# Patient Record
Sex: FEMALE | Race: BLACK OR AFRICAN AMERICAN | Employment: FULL TIME | URBAN - METROPOLITAN AREA
[De-identification: names, ages, dates, MRNs, and addresses within clinical notes are randomized per-mention and may not be internally consistent; named-entity substitution may affect disease eponyms.]

---

## 2017-01-14 DIAGNOSIS — M17.0 PRIMARY OSTEOARTHRITIS OF KNEES, BILATERAL: ICD-10-CM

## 2017-01-14 DIAGNOSIS — M25.551 RIGHT HIP PAIN: ICD-10-CM

## 2017-01-16 RX ORDER — IBUPROFEN 800 MG/1
TABLET ORAL
Qty: 90 TAB | Refills: 0 | Status: SHIPPED | OUTPATIENT
Start: 2017-01-16 | End: 2017-04-01 | Stop reason: SDUPTHER

## 2017-02-14 RX ORDER — LOSARTAN POTASSIUM AND HYDROCHLOROTHIAZIDE 12.5; 5 MG/1; MG/1
TABLET ORAL
Qty: 90 TAB | Refills: 0 | Status: SHIPPED | OUTPATIENT
Start: 2017-02-14 | End: 2017-03-28 | Stop reason: SDUPTHER

## 2017-03-28 ENCOUNTER — OFFICE VISIT (OUTPATIENT)
Dept: FAMILY MEDICINE CLINIC | Age: 52
End: 2017-03-28

## 2017-03-28 VITALS
RESPIRATION RATE: 16 BRPM | SYSTOLIC BLOOD PRESSURE: 144 MMHG | HEART RATE: 78 BPM | OXYGEN SATURATION: 96 % | DIASTOLIC BLOOD PRESSURE: 90 MMHG | HEIGHT: 63 IN | BODY MASS INDEX: 49.61 KG/M2 | WEIGHT: 280 LBS | TEMPERATURE: 98.2 F

## 2017-03-28 DIAGNOSIS — D50.9 IRON DEFICIENCY ANEMIA, UNSPECIFIED IRON DEFICIENCY ANEMIA TYPE: ICD-10-CM

## 2017-03-28 DIAGNOSIS — E78.00 HYPERCHOLESTEREMIA: ICD-10-CM

## 2017-03-28 DIAGNOSIS — R73.03 PREDIABETES: ICD-10-CM

## 2017-03-28 DIAGNOSIS — I10 ESSENTIAL HYPERTENSION: Primary | ICD-10-CM

## 2017-03-28 RX ORDER — SIMVASTATIN 10 MG/1
TABLET, FILM COATED ORAL
Qty: 90 TAB | Refills: 3 | Status: SHIPPED | OUTPATIENT
Start: 2017-03-28 | End: 2018-04-05 | Stop reason: SDUPTHER

## 2017-03-28 RX ORDER — LOSARTAN POTASSIUM AND HYDROCHLOROTHIAZIDE 12.5; 5 MG/1; MG/1
TABLET ORAL
Qty: 90 TAB | Refills: 3 | Status: SHIPPED | OUTPATIENT
Start: 2017-03-28 | End: 2017-07-27 | Stop reason: DRUGHIGH

## 2017-03-28 NOTE — PROGRESS NOTES
1. Have you been to the ER, urgent care clinic since your last visit? Hospitalized since your last visit? No    2. Have you seen or consulted any other health care providers outside of the [address] [place] [address] since your last visit? Include any pap smears or colon screening.  Sandra Vasquez MD - gynecology / cardiovascular  - Dr. Gregory Rothman

## 2017-03-28 NOTE — MR AVS SNAPSHOT
Visit Information Date & Time Provider Department Dept. Phone Encounter #  
 3/28/2017  3:20 PM Liam Whitley, [facility] [phone] Follow-up Instructions Return in about 4 months (around 7/28/2017). Upcoming Health Maintenance Date Due Hepatitis C Screening 1965 DTaP/Tdap/Td series (1 - Tdap) 2/25/1986 BREAST CANCER SCRN MAMMOGRAM 2/25/2015 FOBT Q 1 YEAR AGE 50-75 2/25/2015 INFLUENZA AGE 9 TO ADULT 8/1/2016 PAP AKA CERVICAL CYTOLOGY 2/3/2017 Allergies as of 3/28/2017  Review Complete On: 3/28/2017 By: Carla Bonner LPN No Known Allergies Current Immunizations  Reviewed on 11/29/2016 No immunizations on file. Not reviewed this visit You Were Diagnosed With   
  
 Codes Comments Essential hypertension    -  Primary ICD-10-CM: I10 
ICD-9-CM: 401.9 Hypercholesteremia     ICD-10-CM: E78.00 ICD-9-CM: 272.0 Prediabetes     ICD-10-CM: R73.03 
ICD-9-CM: 790.29 Iron deficiency anemia, unspecified iron deficiency anemia type     ICD-10-CM: D50.9 ICD-9-CM: 280.9 Vitals BP Pulse Temp Resp Height(growth percentile) Weight(growth percentile) 144/90 78 98.2 °F (36.8 °C) (Oral) 16 5' 3\" (1.6 m) 280 lb (127 kg) LMP SpO2 BMI OB Status Smoking Status 03/20/2017 96% 49.6 kg/m2 Having regular periods Never Smoker Vitals History BMI and BSA Data Body Mass Index Body Surface Area  
 49.6 kg/m 2 2.38 m 2 [facility]erred [facility]acy [facility] [address] [facility] Your Updated Medication List  
  
   
This list is accurate as of: 3/28/17  4:38 PM.  Always use your most recent med list.  
  
  
  
  
 cholecalciferol (VITAMIN D3) 5,000 unit Tab tablet Commonly known as:  VITAMIN D3 Take 5,000 Units by mouth daily. clobetasol 0.05 % topical gel Commonly known as:  Maricarmen Vang ferrous sulfate 325 mg (65 mg iron) tablet Take 1 Tab by mouth three (3) times daily (with meals). * ibuprofen 800 mg tablet Commonly known as:  MOTRIN Take 800 mg by mouth as needed. Takes  2 at night as needed * ibuprofen 800 mg tablet Commonly known as:  MOTRIN  
TAKE ONE TABLET BY MOUTH THREE TIMES DAILY losartan-hydroCHLOROthiazide 50-12.5 mg per tablet Commonly known as:  HYZAAR  
TAKE ONE TABLET BY MOUTH ONCE DAILY  
  
 omeprazole 40 mg capsule Commonly known as:  PRILOSEC * simvastatin 10 mg tablet Commonly known as:  ZOCOR Take 10 mg by mouth daily. * simvastatin 10 mg tablet Commonly known as:  ZOCOR  
TAKE ONE TABLET BY MOUTH NIGHTLY * Notice: This list has 4 medication(s) that are the same as other medications prescribed for you. Read the directions carefully, and ask your doctor or other care provider to review them with you. Prescriptions Sent to Pharmacy Refills  
 simvastatin (ZOCOR) 10 mg tablet 3 Sig: TAKE ONE TABLET BY MOUTH NIGHTLY Class: Normal  
 Pharmacy: [facility] [address] [place] [address], [address] Ph #: [phone]  
 losartan-hydroCHLOROthiazide (HYZAAR) 50-12.5 mg per tablet 3 Sig: TAKE ONE TABLET BY MOUTH ONCE DAILY Class: Normal  
 Pharmacy: [address] [place] [address], [address] [place] [address] Ph #: [phone] Follow-up Instructions Return in about 4 months (around 7/28/2017). To-Do List   
 03/28/2017 Lab:  CBC WITH AUTOMATED DIFF   
  
 03/28/2017 Lab:  HEMOGLOBIN A1C WITH EAG   
  
 04/04/2017 Lab:  ALT   
  
 04/04/2017 Lab:  AST   
  
 04/04/2017 Lab:  LIPID PANEL   
  
 04/04/2017 Lab:  METABOLIC PANEL, BASIC Introducing [facility]! Dear Janell Thomas: 
Thank you for requesting a [facility] account. Our records indicate that you already have an active [facility] account.   You can access your account anytime at [email or web address] Did you know that you can access your hospital and ER discharge instructions at any time in [facility]? You can also review all of your test results from your hospital stay or ER visit. Additional Information If you have questions, please visit the Frequently Asked Questions section of the [facility] website at [email or web address] Remember, [facility] is NOT to be used for urgent needs. For medical emergencies, dial 911. Now available from your iPhone and Android! Please provide this summary of care documentation to your next provider. Your primary care clinician is listed as [address]. If you have any questions after today's visit, please call [phone].

## 2017-03-28 NOTE — PROGRESS NOTES
HISTORY OF PRESENT ILLNESS  Isidro Mora is a 46 y.o. female. HPI  Patient is here today for evaluation and treatment of: Hypertension/Cholesterol/Anemia    Hypertension: Pt is on hyzaar ; she tolerates med well; Cholesterol: continues on zocor;  Plans to begin a weight loss regimen to control her cholesterol as well as her weight and BP  Pt has also had lab evidence of  Prediabetes. Anemia: she has had a D and C;  She has not yet had a repeat CBC; she is no longer having heavy periods    PMH,  Meds, Allergies, Family History, Social history reviewed        Current Outpatient Prescriptions:     losartan-hydroCHLOROthiazide (HYZAAR) 50-12.5 mg per tablet, TAKE ONE TABLET BY MOUTH ONCE DAILY, Disp: 90 Tab, Rfl: 0    ibuprofen (MOTRIN) 800 mg tablet, TAKE ONE TABLET BY MOUTH THREE TIMES DAILY, Disp: 90 Tab, Rfl: 0    simvastatin (ZOCOR) 10 mg tablet, Take 10 mg by mouth daily. , Disp: , Rfl:     polyethylene glycol (MIRALAX) 17 gram packet, Take 17 g by mouth once., Disp: , Rfl:     cholecalciferol, VITAMIN D3, (VITAMIN D3) 5,000 unit tab tablet, Take 5,000 Units by mouth daily. , Disp: , Rfl:     ferrous sulfate 325 mg (65 mg iron) tablet, Take 1 Tab by mouth three (3) times daily (with meals). , Disp: 100 Tab, Rfl: 6    clobetasol (TEMOVATE) 0.05 % topical gel, , Disp: , Rfl:     omeprazole (PRILOSEC) 40 mg capsule, , Disp: , Rfl:     ibuprofen (MOTRIN) 800 mg tablet, Take 800 mg by mouth as needed. Takes  2 at night as needed , Disp: , Rfl:     miSOPROStol (CYTOTEC) 200 mcg tablet, Take 200 mcg by mouth two (2) times a day. Indications: instructed to take sunday 2 times a day, and monday 2 times a  day, Disp: , Rfl:       Review of Systems   Constitutional: Negative for malaise/fatigue and weight loss. Cardiovascular: Negative for chest pain, palpitations and leg swelling. Physical Exam   Constitutional: She appears well-developed and well-nourished. No distress.    Cardiovascular: Normal rate and regular rhythm. Exam reveals no gallop and no friction rub. No murmur heard. Pulmonary/Chest: Breath sounds normal. No respiratory distress. She has no wheezes. Musculoskeletal: She exhibits no edema. Nursing note and vitals reviewed. Visit Vitals    BP (!) 144/94 (BP 1 Location: Left arm, BP Patient Position: Sitting)    Pulse 78    Temp 98.2 °F (36.8 °C) (Oral)    Resp 16    Ht 5' 3\" (1.6 m)    Wt 280 lb (127 kg)    LMP 03/20/2017    SpO2 96%    BMI 49.6 kg/m2       ASSESSMENT and PLAN    ICD-10-CM ICD-9-CM    1. Essential hypertension- slightly elevated;  M73 664.7 METABOLIC PANEL, BASIC   2. Hypercholesteremia E78.00 272.0 LIPID PANEL      AST      ALT   3. Prediabetes R73.03 790.29 HEMOGLOBIN A1C WITH EAG   4. Iron deficiency anemia, unspecified iron deficiency anemia type D50.9 280.9 CBC WITH AUTOMATED DIFF       As above,   above all stable unless otherwise noted  Labs as ordered  Continue current meds as ordered  Follow-up Disposition:  Return in about 4 months (around 7/28/2017). An After Visit Summary was printed and given to the patient. This has been fully explained to the patient, who indicates understanding.

## 2017-03-30 ENCOUNTER — HOSPITAL ENCOUNTER (OUTPATIENT)
Dept: LAB | Age: 52
Discharge: HOME | End: 2017-03-30
Payer: COMMERCIAL

## 2017-03-30 DIAGNOSIS — E78.00 HYPERCHOLESTEREMIA: ICD-10-CM

## 2017-03-30 DIAGNOSIS — I10 ESSENTIAL HYPERTENSION: ICD-10-CM

## 2017-03-30 DIAGNOSIS — D50.9 IRON DEFICIENCY ANEMIA, UNSPECIFIED IRON DEFICIENCY ANEMIA TYPE: ICD-10-CM

## 2017-03-30 DIAGNOSIS — R73.03 PREDIABETES: ICD-10-CM

## 2017-03-30 LAB
ALT SERPL-CCNC: 18 U/L (ref 13–56)
ANION GAP BLD CALC-SCNC: 8 MMOL/L (ref 3–18)
AST SERPL W P-5'-P-CCNC: 8 U/L (ref 15–37)
BASOPHILS # BLD AUTO: 0 K/UL (ref 0–0.06)
BASOPHILS # BLD: 1 % (ref 0–2)
BUN SERPL-MCNC: 16 MG/DL (ref 7–18)
BUN/CREAT SERPL: 22 (ref 12–20)
CALCIUM SERPL-MCNC: 9.2 MG/DL (ref 8.5–10.1)
CHLORIDE SERPL-SCNC: 103 MMOL/L (ref 100–108)
CHOLEST SERPL-MCNC: 161 MG/DL
CO2 SERPL-SCNC: 29 MMOL/L (ref 21–32)
CREAT SERPL-MCNC: 0.74 MG/DL (ref 0.6–1.3)
DIFFERENTIAL METHOD BLD: ABNORMAL
EOSINOPHIL # BLD: 0.2 K/UL (ref 0–0.4)
EOSINOPHIL NFR BLD: 3 % (ref 0–5)
ERYTHROCYTE [DISTWIDTH] IN BLOOD BY AUTOMATED COUNT: 19.8 % (ref 11.6–14.5)
EST. AVERAGE GLUCOSE BLD GHB EST-MCNC: 143 MG/DL
GLUCOSE SERPL-MCNC: 122 MG/DL (ref 74–99)
HBA1C MFR BLD: 6.6 % (ref 4.2–5.6)
HCT VFR BLD AUTO: 33.5 % (ref 35–45)
HDLC SERPL-MCNC: 49 MG/DL (ref 40–60)
HDLC SERPL: 3.3 {RATIO} (ref 0–5)
HGB BLD-MCNC: 10.3 G/DL (ref 12–16)
LDLC SERPL CALC-MCNC: 88.6 MG/DL (ref 0–100)
LIPID PROFILE,FLP: NORMAL
LYMPHOCYTES # BLD AUTO: 33 % (ref 21–52)
LYMPHOCYTES # BLD: 2.2 K/UL (ref 0.9–3.6)
MCH RBC QN AUTO: 22.8 PG (ref 24–34)
MCHC RBC AUTO-ENTMCNC: 30.7 G/DL (ref 31–37)
MCV RBC AUTO: 74.3 FL (ref 74–97)
MONOCYTES # BLD: 0.3 K/UL (ref 0.05–1.2)
MONOCYTES NFR BLD AUTO: 5 % (ref 3–10)
NEUTS SEG # BLD: 3.8 K/UL (ref 1.8–8)
NEUTS SEG NFR BLD AUTO: 58 % (ref 40–73)
PLATELET # BLD AUTO: 348 K/UL (ref 135–420)
PMV BLD AUTO: 10.5 FL (ref 9.2–11.8)
POTASSIUM SERPL-SCNC: 3.8 MMOL/L (ref 3.5–5.5)
RBC # BLD AUTO: 4.51 M/UL (ref 4.2–5.3)
SODIUM SERPL-SCNC: 140 MMOL/L (ref 136–145)
TRIGL SERPL-MCNC: 117 MG/DL (ref ?–150)
VLDLC SERPL CALC-MCNC: 23.4 MG/DL
WBC # BLD AUTO: 6.6 K/UL (ref 4.6–13.2)

## 2017-03-30 PROCEDURE — 80048 BASIC METABOLIC PNL TOTAL CA: CPT | Performed by: FAMILY MEDICINE

## 2017-03-30 PROCEDURE — 83036 HEMOGLOBIN GLYCOSYLATED A1C: CPT | Performed by: FAMILY MEDICINE

## 2017-03-30 PROCEDURE — 36415 COLL VENOUS BLD VENIPUNCTURE: CPT | Performed by: FAMILY MEDICINE

## 2017-03-30 PROCEDURE — 84460 ALANINE AMINO (ALT) (SGPT): CPT | Performed by: FAMILY MEDICINE

## 2017-03-30 PROCEDURE — 80061 LIPID PANEL: CPT | Performed by: FAMILY MEDICINE

## 2017-03-30 PROCEDURE — 85025 COMPLETE CBC W/AUTO DIFF WBC: CPT | Performed by: FAMILY MEDICINE

## 2017-03-30 PROCEDURE — 84450 TRANSFERASE (AST) (SGOT): CPT | Performed by: FAMILY MEDICINE

## 2017-04-01 DIAGNOSIS — M25.551 RIGHT HIP PAIN: ICD-10-CM

## 2017-04-01 DIAGNOSIS — M17.0 PRIMARY OSTEOARTHRITIS OF KNEES, BILATERAL: ICD-10-CM

## 2017-04-03 RX ORDER — IBUPROFEN 800 MG/1
TABLET ORAL
Qty: 90 TAB | Refills: 0 | Status: SHIPPED | OUTPATIENT
Start: 2017-04-03 | End: 2017-06-25 | Stop reason: SDUPTHER

## 2017-06-24 DIAGNOSIS — M17.0 PRIMARY OSTEOARTHRITIS OF KNEES, BILATERAL: ICD-10-CM

## 2017-06-24 DIAGNOSIS — M25.551 RIGHT HIP PAIN: ICD-10-CM

## 2017-06-25 DIAGNOSIS — M17.0 PRIMARY OSTEOARTHRITIS OF KNEES, BILATERAL: ICD-10-CM

## 2017-06-25 DIAGNOSIS — M25.551 RIGHT HIP PAIN: ICD-10-CM

## 2017-06-26 RX ORDER — IBUPROFEN 800 MG/1
TABLET ORAL
Qty: 90 TAB | Refills: 0 | Status: SHIPPED | OUTPATIENT
Start: 2017-06-26 | End: 2017-10-19 | Stop reason: SDUPTHER

## 2017-06-26 RX ORDER — IBUPROFEN 800 MG/1
TABLET ORAL
Qty: 90 TAB | Refills: 0 | Status: SHIPPED | OUTPATIENT
Start: 2017-06-26 | End: 2017-07-27 | Stop reason: SDUPTHER

## 2017-07-27 ENCOUNTER — OFFICE VISIT (OUTPATIENT)
Dept: FAMILY MEDICINE CLINIC | Age: 52
End: 2017-07-27

## 2017-07-27 VITALS
BODY MASS INDEX: 51.56 KG/M2 | OXYGEN SATURATION: 99 % | HEART RATE: 82 BPM | RESPIRATION RATE: 16 BRPM | HEIGHT: 63 IN | WEIGHT: 291 LBS | SYSTOLIC BLOOD PRESSURE: 124 MMHG | DIASTOLIC BLOOD PRESSURE: 94 MMHG | TEMPERATURE: 98.2 F

## 2017-07-27 DIAGNOSIS — E78.00 HYPERCHOLESTEROLEMIA: ICD-10-CM

## 2017-07-27 DIAGNOSIS — D50.8 IRON DEFICIENCY ANEMIA SECONDARY TO INADEQUATE DIETARY IRON INTAKE: ICD-10-CM

## 2017-07-27 DIAGNOSIS — E55.9 VITAMIN D DEFICIENCY: ICD-10-CM

## 2017-07-27 DIAGNOSIS — R73.03 PREDIABETES: ICD-10-CM

## 2017-07-27 DIAGNOSIS — I10 ESSENTIAL HYPERTENSION: Primary | ICD-10-CM

## 2017-07-27 RX ORDER — GLUCOSAMINE SULFATE 1500 MG
POWDER IN PACKET (EA) ORAL DAILY
COMMUNITY
End: 2018-04-11 | Stop reason: ALTCHOICE

## 2017-07-27 RX ORDER — LOSARTAN POTASSIUM AND HYDROCHLOROTHIAZIDE 12.5; 1 MG/1; MG/1
1 TABLET ORAL DAILY
Qty: 30 TAB | Refills: 6 | Status: SHIPPED | OUTPATIENT
Start: 2017-07-27 | End: 2018-03-08 | Stop reason: SDUPTHER

## 2017-07-27 NOTE — PATIENT INSTRUCTIONS

## 2017-07-27 NOTE — MR AVS SNAPSHOT
Visit Information Date & Time Provider Department Dept. Phone Encounter #  
 7/27/2017  3:40 PM Roverto Magdaleno, [address] [id number] Follow-up Instructions Return in about 4 months (around 11/27/2017) for BP. Upcoming Health Maintenance Date Due Hepatitis C Screening 1965 DTaP/Tdap/Td series (1 - Tdap) 2/25/1986 BREAST CANCER SCRN MAMMOGRAM 2/25/2015 FOBT Q 1 YEAR AGE 50-75 2/25/2015 PAP AKA CERVICAL CYTOLOGY 2/3/2017 INFLUENZA AGE 9 TO ADULT 8/1/2017 Allergies as of 7/27/2017  Review Complete On: 7/27/2017 By: Monique Arriaga LPN No Known Allergies Current Immunizations  Reviewed on 11/29/2016 No immunizations on file. Not reviewed this visit You Were Diagnosed With   
  
 Codes Comments Vitamin D deficiency    -  Primary ICD-10-CM: E55.9 ICD-9-CM: 268.9 Essential hypertension     ICD-10-CM: I10 
ICD-9-CM: 401.9 Hypercholesterolemia     ICD-10-CM: E78.00 ICD-9-CM: 272.0 Prediabetes     ICD-10-CM: R73.03 
ICD-9-CM: 790.29 Iron deficiency anemia secondary to inadequate dietary iron intake     ICD-10-CM: D50.8 ICD-9-CM: 280.1 Vitals BP Pulse Temp Resp Height(growth percentile) Weight(growth percentile) (!) 124/94 82 98.2 °F (36.8 °C) (Oral) 16 5' 3\" (1.6 m) 291 lb (132 kg) LMP SpO2 BMI OB Status Smoking Status 05/02/2017 99% 51.55 kg/m2 Unknown Never Smoker Vitals History BMI and BSA Data Body Mass Index Body Surface Area 51.55 kg/m 2 2.42 m 2 Preferred Pharmacy Pharm[facility] Name Phone [facility] [address] [facility] Your Updated Medication List  
  
   
This list is accurate as of: 7/27/17  4:37 PM.  Always use your most recent med list.  
  
  
  
  
 clobetasol 0.05 % topical gel Commonly known as:  TEMOVATE  
  
 ferrous sulfate 325 mg (65 mg iron) tablet Take 1 Tab by mouth three (3) times daily (with meals). ibuprofen 800 mg tablet Commonly known as:  MOTRIN  
TAKE ONE TABLET BY MOUTH THREE TIMES DAILY losartan-hydroCHLOROthiazide 100-12.5 mg per tablet Commonly known as:  HYZAAR Take 1 Tab by mouth daily. omeprazole 40 mg capsule Commonly known as:  PRILOSEC  
  
 simvastatin 10 mg tablet Commonly known as:  ZOCOR  
TAKE ONE TABLET BY MOUTH NIGHTLY  
  
 VITAMIN D3 1,000 unit Cap Generic drug:  cholecalciferol Take  by mouth daily. Prescriptions Sent to Pharmacy Refills  
 losartan-hydroCHLOROthiazide (HYZAAR) 100-12.5 mg per tablet 6 Sig: Take 1 Tab by mouth daily. Class: Normal  
 Pharmacy: [address] [place] [address] #: [phone] Route: Oral  
  
Follow-up Instructions Return in about 4 months (around 11/27/2017) for BP. To-Do List   
 07/31/2017 Lab:  ALT   
  
 07/31/2017 Lab:  AST   
  
 07/31/2017 Lab:  CBC WITH AUTOMATED DIFF   
  
 07/31/2017 Lab:  HEMOGLOBIN A1C WITH EAG   
  
 07/31/2017 Lab:  LIPID PANEL   
  
 07/31/2017 Lab:  METABOLIC PANEL, BASIC Patient Instructions DASH Diet: Care Instructions Your Care Instructions The DASH diet is an eating plan that can help lower your blood pressure. DASH stands for Dietary Approaches to Stop Hypertension. Hypertension is high blood pressure. The DASH diet focuses on eating foods that are high in calcium, potassium, and magnesium. These nutrients can lower blood pressure. The foods that are highest in these nutrients are fruits, vegetables, low-fat dairy products, nuts, seeds, and legumes. But taking calcium, potassium, and magnesium supplements instead of eating foods that are high in those nutrients does not have the same effect. The DASH diet also includes whole grains, fish, and poultry. The DASH diet is one of several lifestyle changes your doctor may recommend to lower your high blood pressure. Your doctor may also want you to decrease the amount of sodium in your diet. Lowering sodium while following the DASH diet can lower blood pressure even further than just the DASH diet alone. Follow-up care is a key part of your treatment and safety. Be sure to make and go to all appointments, and call your doctor if you are having problems. It's also a good idea to know your test results and keep a list of the medicines you take. How can you care for yourself at home? Following the DASH diet · Eat 4 to 5 servings of fruit each day. A serving is 1 medium-sized piece of fruit, ½ cup chopped or canned fruit, 1/4 cup dried fruit, or 4 ounces (½ cup) of fruit juice. Choose fruit more often than fruit juice. · Eat 4 to 5 servings of vegetables each day. A serving is 1 cup of lettuce or raw leafy vegetables, ½ cup of chopped or cooked vegetables, or 4 ounces (½ cup) of vegetable juice. Choose vegetables more often than vegetable juice. · Get 2 to 3 servings of low-fat and fat-free dairy each day. A serving is 8 ounces of milk, 1 cup of yogurt, or 1 ½ ounces of cheese. · Eat 6 to 8 servings of grains each day. A serving is 1 slice of bread, 1 ounce of dry cereal, or ½ cup of cooked rice, pasta, or cooked cereal. Try to choose whole-grain products as much as possible. · Limit lean meat, poultry, and fish to 2 servings each day. A serving is 3 ounces, about the size of a deck of cards. · Eat 4 to 5 servings of nuts, seeds, and legumes (cooked dried beans, lentils, and split peas) each week. A serving is 1/3 cup of nuts, 2 tablespoons of seeds, or ½ cup of cooked beans or peas. · Limit fats and oils to 2 to 3 servings each day. A serving is 1 teaspoon of vegetable oil or 2 tablespoons of salad dressing. · Limit sweets and added sugars to 5 servings or less a week.  A serving is 1 tablespoon jelly or jam, ½ cup sorbet, or 1 cup of lemonade. · Eat less than 2,300 milligrams (mg) of sodium a day. If you limit your sodium to 1,500 mg a day, you can lower your blood pressure even more. Tips for success · Start small. Do not try to make dramatic changes to your diet all at once. You might feel that you are missing out on your favorite foods and then be more likely to not follow the plan. Make small changes, and stick with them. Once those changes become habit, add a few more changes. · Try some of the following: ¨ Make it a goal to eat a fruit or vegetable at every meal and at snacks. This will make it easy to get the recommended amount of fruits and vegetables each day. ¨ Try yogurt topped with fruit and nuts for a snack or healthy dessert. ¨ Add lettuce, tomato, cucumber, and onion to sandwiches. ¨ Combine a ready-made pizza crust with low-fat mozzarella cheese and lots of vegetable toppings. Try using tomatoes, squash, spinach, broccoli, carrots, cauliflower, and onions. ¨ Have a variety of cut-up vegetables with a low-fat dip as an appetizer instead of chips and dip. ¨ Sprinkle sunflower seeds or chopped almonds over salads. Or try adding chopped walnuts or almonds to cooked vegetables. ¨ Try some vegetarian meals using beans and peas. Add garbanzo or kidney beans to salads. Make burritos and tacos with mashed raygoza beans or black beans. Where can you learn more? Go to http://marcos-stacy.info/. Enter Z374 in the search box to learn more about \"DASH Diet: Care Instructions. \" Current as of: April 3, 2017 Content Version: 11.3 © 6205-8388 [employer]. Care instructions adapted under license by [employer] (which disclaims liability or warranty for this information).  If you have questions about a medical condition or this instruction, always ask your healthcare professional. [employer] disclaims any warranty or liability for your use of this information. Introducing [facility]! Dear Nato Montes: 
Thank you for requesting a [employer] account. Our records indicate that you already have an active [employer] account. You can access your account anytime at [email or web address] Did you know that you can access your hospital and ER discharge instructions at any time in [employer]? You can also review all of your test results from your hospital stay or ER visit. Additional Information If you have questions, please visit the Frequently Asked Questions section of the [employer] website at [email or web address] Remember, [employer] is NOT to be used for urgent needs. For medical emergencies, dial 911. Now available from your iPhone and Android! Please provide this summary of care documentation to your next provider. Your primary care clinician is listed as [address]. If you have any questions after today's visit, please call [phone].

## 2017-07-27 NOTE — PROGRESS NOTES
HISTORY OF PRESENT ILLNESS  Estee Ribera is a 46 y.o. female. HPI  Patient is here today for evaluation and treatment of: Prediabetes/Hypertension/Cholesterol/Anemia    Prediabetes:   She attempts a lower sugar, lower carb diet; She does not exercise much. Does report some weight gain. Hypertension:  BP initially elevated;  Better at next check. Wt Readings from Last 3 Encounters:   07/27/17 291 lb (132 kg)   03/28/17 280 lb (127 kg)   12/06/16 276 lb 7.3 oz (125.4 kg)         Cholesterol: On zocor; attempts a lower cholesterol diet. Wants to lose weight. Anemia: pt has had chronic anemia; she has had a hysterectomy for fibroids; she needs a recheck on her CBC. Pt also has a h/o vitamin D deficiency and needs a recheck on lab      Current Outpatient Prescriptions:     cholecalciferol (VITAMIN D3) 1,000 unit cap, Take  by mouth daily. , Disp: , Rfl:     losartan-hydroCHLOROthiazide (HYZAAR) 100-12.5 mg per tablet, Take 1 Tab by mouth daily. , Disp: 30 Tab, Rfl: 6    ibuprofen (MOTRIN) 800 mg tablet, TAKE ONE TABLET BY MOUTH THREE TIMES DAILY, Disp: 90 Tab, Rfl: 0    simvastatin (ZOCOR) 10 mg tablet, TAKE ONE TABLET BY MOUTH NIGHTLY, Disp: 90 Tab, Rfl: 3    ferrous sulfate 325 mg (65 mg iron) tablet, Take 1 Tab by mouth three (3) times daily (with meals). , Disp: 100 Tab, Rfl: 6    clobetasol (TEMOVATE) 0.05 % topical gel, , Disp: , Rfl:     omeprazole (PRILOSEC) 40 mg capsule, , Disp: , Rfl:       PMH,  Meds, Allergies, Family History, Social history reviewed    Review of Systems   Constitutional: Negative for chills and fever. Cardiovascular: Negative for chest pain, palpitations and leg swelling. Physical Exam   Constitutional: She appears well-developed and well-nourished. No distress. Cardiovascular: Normal rate and regular rhythm. Exam reveals no gallop and no friction rub. No murmur heard. Pulmonary/Chest: Breath sounds normal. No respiratory distress.  She has no wheezes. She has no rales. Musculoskeletal: She exhibits no edema. Nursing note and vitals reviewed. Visit Vitals    BP (!) 124/94    Pulse 82    Temp 98.2 °F (36.8 °C) (Oral)    Resp 16    Ht 5' 3\" (1.6 m)    Wt 291 lb (132 kg)    LMP 05/02/2017    SpO2 99%    BMI 51.55 kg/m2     General appearance: alert, cooperative, no distress, appears stated age  Neck: supple, symmetrical, trachea midline, no adenopathy, thyroid: not enlarged, symmetric, no tenderness/mass/nodules, no carotid bruit and no JVD  Lungs: clear to auscultation bilaterally  Heart: regular rate and rhythm, S1, S2 normal, no murmur, click, rub or gallop  Extremities: extremities normal, atraumatic, no cyanosis or edema        Lab Results   Component Value Date/Time    WBC 6.6 03/30/2017 08:32 AM    Hemoglobin, POC 6.8 11/23/2016 09:09 AM    HGB 10.3 03/30/2017 08:32 AM    Hematocrit, POC 20 11/23/2016 09:09 AM    HCT 33.5 03/30/2017 08:32 AM    PLATELET 041 30/76/2361 08:32 AM    MCV 74.3 03/30/2017 08:32 AM         ASSESSMENT and PLAN    ICD-10-CM ICD-9-CM    1. Essential hypertension H80 779.2 METABOLIC PANEL, BASIC      losartan-hydroCHLOROthiazide (HYZAAR) 100-12.5 mg per tablet   2. Hypercholesterolemia E78.00 272.0 LIPID PANEL      AST      ALT   3. Prediabetes R73.03 790.29 HEMOGLOBIN A1C WITH EAG   4. Vitamin D deficiency E55.9 268.9    5. Iron deficiency anemia secondary to inadequate dietary iron intake D50.8 280.1 CBC WITH AUTOMATED DIFF       As above,   Increase hyzaar 100/12.5  Low sodium diet and exercise   treatment plan as listed below  Orders Placed This Encounter    METABOLIC PANEL, BASIC    LIPID PANEL    AST    ALT    HEMOGLOBIN A1C WITH EAG    CBC WITH AUTOMATED DIFF    cholecalciferol (VITAMIN D3) 1,000 unit cap    losartan-hydroCHLOROthiazide (HYZAAR) 100-12.5 mg per tablet     Follow-up Disposition:  Return in about 4 months (around 11/27/2017) for BP.   An After Visit Summary was printed and given to the patient. This has been fully explained to the patient, who indicates understanding.

## 2017-07-27 NOTE — PROGRESS NOTES
1. Have you been to the ER, urgent care clinic since your last visit? Hospitalized since your last visit? No    2. Have you seen or consulted any other health care providers outside of the [address] [place] [address] since your last visit? Include any pap smears or colon screening.  Yes  - Juan Landa MD - gynecology

## 2017-07-31 RX ORDER — OMEPRAZOLE 40 MG/1
40 CAPSULE, DELAYED RELEASE ORAL DAILY
Qty: 30 CAP | Refills: 3 | Status: SHIPPED | OUTPATIENT
Start: 2017-07-31 | End: 2018-04-05 | Stop reason: SDUPTHER

## 2017-10-19 DIAGNOSIS — M25.551 RIGHT HIP PAIN: ICD-10-CM

## 2017-10-19 DIAGNOSIS — M17.0 PRIMARY OSTEOARTHRITIS OF KNEES, BILATERAL: ICD-10-CM

## 2017-10-20 RX ORDER — IBUPROFEN 800 MG/1
TABLET ORAL
Qty: 90 TAB | Refills: 0 | Status: SHIPPED | OUTPATIENT
Start: 2017-10-20 | End: 2017-12-02 | Stop reason: SDUPTHER

## 2017-11-02 RX ORDER — LANOLIN ALCOHOL/MO/W.PET/CERES
CREAM (GRAM) TOPICAL
Qty: 100 TAB | Refills: 6 | Status: SHIPPED | OUTPATIENT
Start: 2017-11-02 | End: 2018-04-11 | Stop reason: ALTCHOICE

## 2017-12-02 DIAGNOSIS — M17.0 PRIMARY OSTEOARTHRITIS OF KNEES, BILATERAL: ICD-10-CM

## 2017-12-02 DIAGNOSIS — M25.551 RIGHT HIP PAIN: ICD-10-CM

## 2017-12-04 RX ORDER — IBUPROFEN 800 MG/1
TABLET ORAL
Qty: 90 TAB | Refills: 0 | Status: SHIPPED | OUTPATIENT
Start: 2017-12-04 | End: 2018-03-20 | Stop reason: SDUPTHER

## 2018-02-19 DIAGNOSIS — M17.0 PRIMARY OSTEOARTHRITIS OF KNEES, BILATERAL: ICD-10-CM

## 2018-02-19 DIAGNOSIS — M25.551 RIGHT HIP PAIN: ICD-10-CM

## 2018-02-19 RX ORDER — IBUPROFEN 800 MG/1
800 TABLET ORAL 3 TIMES DAILY
Qty: 270 TAB | Refills: 0 | OUTPATIENT
Start: 2018-02-19

## 2018-02-19 NOTE — TELEPHONE ENCOUNTER
Last Visit: 04/13/2016 with MD Jelena Juarez    Next Appointment: noted to f/u prn since completing Euflexxa injection series   Previous Refill Encounters: 12/04/2017 per MD Watson #[id number]     Requested Prescriptions     Pending Prescriptions Disp Refills    ibuprofen (MOTRIN) 800 mg tablet 270 Tab 0     Sig: Take 1 Tab by mouth three (3) times daily.

## 2018-03-08 DIAGNOSIS — I10 ESSENTIAL HYPERTENSION: ICD-10-CM

## 2018-03-08 RX ORDER — LOSARTAN POTASSIUM AND HYDROCHLOROTHIAZIDE 12.5; 1 MG/1; MG/1
TABLET ORAL
Qty: 30 TAB | Refills: 6 | Status: SHIPPED | OUTPATIENT
Start: 2018-03-08 | End: 2018-04-05 | Stop reason: SDUPTHER

## 2018-03-20 ENCOUNTER — OFFICE VISIT (OUTPATIENT)
Dept: ORTHOPEDIC SURGERY | Facility: CLINIC | Age: 53
End: 2018-03-20

## 2018-03-20 VITALS
WEIGHT: 293 LBS | HEART RATE: 80 BPM | TEMPERATURE: 98.1 F | BODY MASS INDEX: 51.91 KG/M2 | RESPIRATION RATE: 16 BRPM | HEIGHT: 63 IN | SYSTOLIC BLOOD PRESSURE: 162 MMHG | OXYGEN SATURATION: 98 % | DIASTOLIC BLOOD PRESSURE: 92 MMHG

## 2018-03-20 DIAGNOSIS — M17.0 PRIMARY OSTEOARTHRITIS OF KNEES, BILATERAL: ICD-10-CM

## 2018-03-20 DIAGNOSIS — M75.51 CHRONIC SHOULDER BURSITIS, RIGHT: ICD-10-CM

## 2018-03-20 DIAGNOSIS — M25.551 RIGHT HIP PAIN: ICD-10-CM

## 2018-03-20 DIAGNOSIS — M25.511 CHRONIC RIGHT SHOULDER PAIN: Primary | ICD-10-CM

## 2018-03-20 DIAGNOSIS — M75.52 CHRONIC SHOULDER BURSITIS, LEFT: ICD-10-CM

## 2018-03-20 DIAGNOSIS — G89.29 CHRONIC RIGHT SHOULDER PAIN: Primary | ICD-10-CM

## 2018-03-20 DIAGNOSIS — G89.29 CHRONIC LEFT SHOULDER PAIN: ICD-10-CM

## 2018-03-20 DIAGNOSIS — S93.402A SPRAIN OF LEFT ANKLE, UNSPECIFIED LIGAMENT, INITIAL ENCOUNTER: ICD-10-CM

## 2018-03-20 DIAGNOSIS — M25.512 CHRONIC LEFT SHOULDER PAIN: ICD-10-CM

## 2018-03-20 DIAGNOSIS — M25.572 CHRONIC PAIN OF LEFT ANKLE: ICD-10-CM

## 2018-03-20 DIAGNOSIS — G89.29 CHRONIC PAIN OF LEFT ANKLE: ICD-10-CM

## 2018-03-20 PROBLEM — E66.01 OBESITY, MORBID (HCC): Status: ACTIVE | Noted: 2018-03-20

## 2018-03-20 RX ORDER — BETAMETHASONE SODIUM PHOSPHATE AND BETAMETHASONE ACETATE 3; 3 MG/ML; MG/ML
6 INJECTION, SUSPENSION INTRA-ARTICULAR; INTRALESIONAL; INTRAMUSCULAR; SOFT TISSUE ONCE
Qty: 0.5 ML | Refills: 0
Start: 2018-03-20 | End: 2018-03-20

## 2018-03-20 RX ORDER — BUPIVACAINE HYDROCHLORIDE 2.5 MG/ML
4 INJECTION, SOLUTION EPIDURAL; INFILTRATION; INTRACAUDAL ONCE
Qty: 4 ML | Refills: 0
Start: 2018-03-20 | End: 2018-03-20

## 2018-03-20 RX ORDER — IBUPROFEN 800 MG/1
800 TABLET ORAL
Qty: 90 TAB | Refills: 0 | Status: SHIPPED | OUTPATIENT
Start: 2018-03-20 | End: 2018-06-21 | Stop reason: SDUPTHER

## 2018-03-20 NOTE — PATIENT INSTRUCTIONS
Shoulder Bursitis: Exercises  Your Care Instructions  Here are some examples of typical rehabilitation exercises for your condition. Start each exercise slowly. Ease off the exercise if you start to have pain. Your doctor or physical therapist will tell you when you can start these exercises and which ones will work best for you. How to do the exercises  Posterior stretching exercise    1. Hold the elbow of your injured arm with your other hand. 2. Use your hand to pull your injured arm gently up and across your body. You will feel a gentle stretch across the back of your injured shoulder. 3. Hold for at least 15 to 30 seconds. Then slowly lower your arm. 4. Repeat 2 to 4 times. Up-the-back stretch    Your doctor or physical therapist may want you to wait to do this stretch until you have regained most of your range of motion and strength. You can do this stretch in different ways. Hold any of these stretches for at least 15 to 30 seconds. Repeat them 2 to 4 times. 1. Put your hand in your back pocket. Let it rest there to stretch your shoulder. 2. With your other hand, hold your injured arm (palm outward) behind your back by the wrist. Pull your arm up gently to stretch your shoulder. 3. Next, put a towel over your other shoulder. Put the hand of your injured arm behind your back. Now hold the back end of the towel. With the other hand, hold the front end of the towel in front of your body. Pull gently on the front end of the towel. This will bring your hand farther up your back to stretch your shoulder. Overhead stretch    1. Standing about an arm's length away, grasp onto a solid surface. You could use a countertop, a doorknob, or the back of a sturdy chair. 2. With your knees slightly bent, bend forward with your arms straight. Lower your upper body, and let your shoulders stretch. 3. As your shoulders are able to stretch farther, you may need to take a step or two backward.   4. Hold for at least 15 to 30 seconds. Then stand up and relax. If you had stepped back during your stretch, step forward so you can keep your hands on the solid surface. 5. Repeat 2 to 4 times. Shoulder flexion (lying down)    To make a wand for this exercise, use a piece of PVC pipe or a broom handle with the broom removed. Make the wand about a foot wider than your shoulders. 1. Lie on your back, holding a wand with both hands. Your palms should face down as you hold the wand. 2. Keeping your elbows straight, slowly raise your arms over your head. Raise them until you feel a stretch in your shoulders, upper back, and chest.  3. Hold for 15 to 30 seconds. 4. Repeat 2 to 4 times. Shoulder rotation (lying down)    To make a wand for this exercise, use a piece of PVC pipe or a broom handle with the broom removed. Make the wand about a foot wider than your shoulders. 1. Lie on your back. Hold a wand with both hands with your elbows bent and palms up. 2. Keep your elbows close to your body, and move the wand across your body toward the sore arm. 3. Hold for 8 to 12 seconds. 4. Repeat 2 to 4 times. Shoulder blade squeeze    1. Stand with your arms at your sides, and squeeze your shoulder blades together. Do not raise your shoulders up as you squeeze. 2. Hold 6 seconds. 3. Repeat 8 to 12 times. Shoulder flexor and extensor exercise    These are isometric exercises. That means you contract your muscles without actually moving. 1. Push forward (flex): Stand facing a wall or doorjamb, about 6 inches or less back. Hold your injured arm against your body. Make a closed fist with your thumb on top. Then gently push your hand forward into the wall with about 25% to 50% of your strength. Don't let your body move backward as you push. Hold for about 6 seconds. Relax for a few seconds. Repeat 8 to 12 times. 2. Push backward (extend): Stand with your back flat against a wall.  Your upper arm should be against the wall, with your elbow bent 90 degrees (your hand straight ahead). Push your elbow gently back against the wall with about 25% to 50% of your strength. Don't let your body move forward as you push. Hold for about 6 seconds. Relax for a few seconds. Repeat 8 to 12 times. Scapular exercise: Wall push-ups    This exercise is best done with your fingers somewhat turned out, rather than straight up and down. 1. Stand facing a wall, about 12 inches to 18 inches away. 2. Place your hands on the wall at shoulder height. 3. Slowly bend your elbows and bring your face to the wall. Keep your back and hips straight. 4. Push back to where you started. 5. Repeat 8 to 12 times. 6. When you can do this exercise against a wall comfortably, you can try it against a counter. You can then slowly progress to the end of a couch, then to a sturdy chair, and finally to the floor. Scapular exercise: Retraction    For this exercise, you will need elastic exercise material, such as surgical tubing or Thera-Band. 1. Put the band around a solid object at about waist level. (A bedpost will work well.) Each hand should hold an end of the band. 2. With your elbows at your sides and bent to 90 degrees, pull the band back. Your shoulder blades should move toward each other. Then move your arms back where you started. 3. Repeat 8 to 12 times. 4. If you have good range of motion in your shoulders, try this exercise with your arms lifted out to the sides. Keep your elbows at a 90-degree angle. Raise the elastic band up to about shoulder level. Pull the band back to move your shoulder blades toward each other. Then move your arms back where you started. Internal rotator strengthening exercise    1. Start by tying a piece of elastic exercise material to a doorknob. You can use surgical tubing or Thera-Band. 2. Stand or sit with your shoulder relaxed and your elbow bent 90 degrees. Your upper arm should rest comfortably against your side.  Squeeze a rolled towel between your elbow and your body for comfort. This will help keep your arm at your side. 3. Hold one end of the elastic band in the hand of the painful arm. 4. Slowly rotate your forearm toward your body until it touches your belly. Slowly move it back to where you started. 5. Keep your elbow and upper arm firmly tucked against the towel roll or at your side. 6. Repeat 8 to 12 times. External rotator strengthening exercise    1. Start by tying a piece of elastic exercise material to a doorknob. You can use surgical tubing or Thera-Band. (You may also hold one end of the band in each hand.)  2. Stand or sit with your shoulder relaxed and your elbow bent 90 degrees. Your upper arm should rest comfortably against your side. Squeeze a rolled towel between your elbow and your body for comfort. This will help keep your arm at your side. 3. Hold one end of the elastic band with the hand of the painful arm. 4. Start with your forearm across your belly. Slowly rotate the forearm out away from your body. Keep your elbow and upper arm tucked against the towel roll or the side of your body until you begin to feel tightness in your shoulder. Slowly move your arm back to where you started. 5. Repeat 8 to 12 times. Follow-up care is a key part of your treatment and safety. Be sure to make and go to all appointments, and call your doctor if you are having problems. It's also a good idea to know your test results and keep a list of the medicines you take. Where can you learn more? Go to http://marcos-stacy.info/. Enter I060 in the search box to learn more about \"Shoulder Bursitis: Exercises. \"  Current as of: March 21, 2017  Content Version: 11.4  © 6742-9660 [employer]. Care instructions adapted under license by [employer] (which disclaims liability or warranty for this information).  If you have questions about a medical condition or this instruction, always ask your healthcare professional. [address] any warranty or liability for your use of this information. Ankle Sprain: Rehab Exercises  Your Care Instructions  Here are some examples of typical rehabilitation exercises for your condition. Start each exercise slowly. Ease off the exercise if you start to have pain. Your doctor or physical therapist will tell you when you can start these exercises and which ones will work best for you. How to do the exercises  \"Alphabet\" exercise    5. Trace the alphabet with your toe. This helps your ankle move in all directions. Side-to-side knee swing exercise    4. Sit in a chair with your foot flat on the floor. 5. Slowly move your knee from side to side. Keep your foot pressed flat. 6. Continue this exercise for 2 to 3 minutes. Towel curl    6. While sitting, place your foot on a towel on the floor. Scrunch the towel toward you with your toes. 7. Then use your toes to push the towel away from you. 8. To make this exercise more challenging you can put something on the other end of the towel. A can of soup is about the right weight for this. Towel stretch    5. Sit with your legs extended and knees straight. 6. Place a towel around your foot just under the toes. 7. Hold each end of the towel in each hand, with your hands above your knees. 8. Pull back with the towel so that your foot stretches toward you. 9. Hold the position for at least 15 to 30 seconds. 10. Repeat 2 to 4 times a session. Do up to 5 sessions a day. Ankle eversion exercise    5. Start by sitting with your foot flat on the floor. Push your foot outward against a wall or a piece of furniture that doesn't move. Hold for about 6 seconds, and relax. Repeat 8 to 12 times. 6. After you feel comfortable with this, try using rubber tubing looped around the outside of your feet for resistance.  Push your foot out to the side against the tubing, and then count to 10 as you slowly bring your foot back to the middle. Repeat 8 to 12 times. Isometric opposition exercises    4. While sitting, put your feet together flat on the floor. 5. Press your injured foot inward against your other foot. Hold for about 6 seconds, and relax. Repeat 8 to 12 times. 6. Then place the heel of your other foot on top of the injured one. Push down with the top heel while trying to push up with your injured foot. Hold for about 6 seconds, and relax. Repeat 8 to 12 times. Resisted ankle inversion    3. Sit on the floor with your good leg crossed over your other leg. 4. Hold both ends of an exercise band and loop the band around the inside of your affected foot. Then press your other foot against the band. 5. Keeping your legs crossed, slowly push your affected foot against the band so that foot moves away from your other foot. Then slowly relax. 6. Repeat 8 to 12 times. Resisted ankle eversion    7. Sit on the floor with your legs straight. 8. Hold both ends of an exercise band and loop the band around the outside of your affected foot. Then press your other foot against the band. 9. Keeping your leg straight, slowly push your affected foot outward against the band and away from your other foot without letting your leg rotate. Then slowly relax. 10. Repeat 8 to 12 times. Resisted ankle dorsiflexion    5. Tie the ends of an exercise band together to form a loop. Attach one end of the loop to a secure object or shut a door on it to hold it in place. (Or you can have someone hold one end of the loop to provide resistance.)  6. While sitting on the floor or in a chair, loop the other end of the band over the top of your affected foot. 7. Keeping your knee and leg straight, slowly flex your foot to pull back on the exercise band, and then slowly relax. 8. Repeat 8 to 12 times. Single-leg balance    7. Stand on a flat surface with your arms stretched out to your sides like you are making the letter \"T. \" Then lift your good leg off the floor, bending it at the knee. If you are not steady on your feet, use one hand to hold on to a chair, counter, or wall. 8. Standing on the leg with your affected ankle, keep that knee straight. Try to balance on that leg for up to 30 seconds. Then rest for up to 10 seconds. 9. Repeat 6 to 8 times. 10. When you can balance on your affected leg for 30 seconds with your eyes open, try to balance on it with your eyes closed. 11. When you can do this exercise with your eyes closed for 30 seconds and with ease and no pain, try standing on a pillow or piece of foam, and repeat steps 1 through 4. Follow-up care is a key part of your treatment and safety. Be sure to make and go to all appointments, and call your doctor if you are having problems. It's also a good idea to know your test results and keep a list of the medicines you take. Where can you learn more? Go to http://marcos-stacy.info/. Miki Dailey in the search box to learn more about \"Ankle Sprain: Rehab Exercises. \"  Current as of: March 21, 2017  Content Version: 11.4  © 8716-7298 [employer]. Care instructions adapted under license by [employer] (which disclaims liability or warranty for this information). If you have questions about a medical condition or this instruction, always ask your healthcare professional. [place] [address] any warranty or liability for your use of this information.

## 2018-03-20 NOTE — TELEPHONE ENCOUNTER
Last Visit: 03/20/2018 with MD Siddhartha Hernandez    Next Appointment: noted to f/u prn   Previous Refill Encounters: 12/04/2017 per MD Siddhartha Hernandez #[id number]    Requested Prescriptions     Pending Prescriptions Disp Refills    ibuprofen (MOTRIN) 800 mg tablet 90 Tab 0     Sig: Take 1 Tab by mouth three (3) times daily (with meals).

## 2018-03-20 NOTE — PROGRESS NOTES
Patient: Martha Maravilla                MRN: [id number]       SSN: [id number]  YOB: 1965        AGE: 48 y.o. SEX: female      PCP: Emerson Carr MD  03/20/18    Chief Complaint   Patient presents with    Shoulder Pain     RICARDO SHOULDer PAIN level 8/10     HISTORY:  Martha Maravilla is a 48 y.o. female who is seen for bilateral shoulder L>R and left ankle pain. She states she stepped off a curb into a pile of snow that they had pushed next to the walkway and twisted her left ankle on 1/18/18 while at [facility]. She states she noticed pain and swelling immediately after the injury. She notes continued left ankle pain and reports she has been limping as a result. She reports bilateral shoulder pain for the past couple of weeks with no history of injury. She states that she has an aching pain in her shoulders. She was previously seen for bilateral knee pain, R>L. She responded to the Euflexxa Series about a year ago. She fell on her left knee about two weeks ago when she tripped over something in her daughter's room and is experiencing pain and tenderness. She feels as though she is a 80year old woman. She is starting a medical weight loss program soon. She responded to the cortisone injections last OV. She completed a Euflexxa series on 4/13/16 with relief. Pain Assessment  3/20/2018   Location of Pain Shoulder   Location Modifiers Left;Right   Severity of Pain 8   Quality of Pain Aching   Duration of Pain A few minutes   Duration of Pain Comment wrose at night   Frequency of Pain Intermittent   Aggravating Factors (No Data)   Aggravating Factors Comment when leaning on something   Limiting Behavior No   Relieving Factors NSAID   Result of Injury No     Occupation, etc: Ms. Arturo Aceves works as a  for the [employer]. Her mother and  both had difficult experiences with the gastric bypass surgery.   She lives in [place] with her  and son.  Her son just made the varsity high school baseball team at [employer]. Her daughter attends [employer] and is studying pre-med. She is right handed. She is hypertensive. She is not diabetic. She reports she has gained weight recently--to 295#. She is 5'3\". Last 3 Recorded Weights in this Encounter    03/20/18 1014   Weight: 295 lb 12.8 oz (134.2 kg)     Body mass index is 52.4 kg/(m^2). Patient Active Problem List   Diagnosis Code    Vitamin D deficiency E55.9    DDD (degenerative disc disease), cervical M50.30    HTN (hypertension) I10    Iron deficiency anemia D50.9    Hypercholesterolemia E78.00    Prediabetes R73.03    Knee pain, left M25.562    Sleep apnea G47.30    Obesity, morbid ([facility] [address] E66.01       REVIEW OF SYSTEMS: All Below are Negative except: See HPI     Constitutional: negative for fever, chills, and weight loss. Cardiovascular: negative for chest pain, claudication, leg swelling, SOB, STERLING   Gastrointestinal: Negative for pain, N/V/C/D, Blood in stool or urine, dysuria,  hematuria, incontinence, pelvic pain. Musculoskeletal: See HPI   Neurological: Negative for dizziness and weakness. Negative for headaches, Visual changes, confusion, seizures   Phychiatric/Behavioral: Negative for depression, memory loss, substance  abuse. Extremities: Negative for hair changes, rash, or skin lesion changes. Hematologic: Negative for bleeding problems, bruising, pallor or swollen lymph  nodes   Peripheral Vascular: No calf pain, no circulation deficits. Social History     Social History    Marital status:      Spouse name: N/A    Number of children: N/A    Years of education: N/A     Occupational History    Not on file.      Social History Main Topics    Smoking status: Never Smoker    Smokeless tobacco: Never Used    Alcohol use 0.0 oz/week     2 Glasses of wine, 0 Standard drinks or equivalent per week      Comment: on ocassion    Drug use: No    Sexual activity: Not on file     Other Topics Concern    Not on file     Social History Narrative    ** Merged History Encounter **             No Known Allergies     Current Outpatient Prescriptions   Medication Sig    losartan-hydroCHLOROthiazide (HYZAAR) 100-12.5 mg per tablet TAKE ONE TABLET BY MOUTH ONCE DAILY    ibuprofen (MOTRIN) 800 mg tablet TAKE ONE TABLET BY MOUTH THREE TIMES DAILY    omeprazole (PRILOSEC) 40 mg capsule Take 1 Cap by mouth daily.  simvastatin (ZOCOR) 10 mg tablet TAKE ONE TABLET BY MOUTH NIGHTLY    ferrous sulfate 325 mg (65 mg iron) tablet TAKE ONE TABLET BY MOUTH THREE TIMES DAILY WITH MEALS    cholecalciferol (VITAMIN D3) 1,000 unit cap Take  by mouth daily.  clobetasol (TEMOVATE) 0.05 % topical gel      No current facility-administered medications for this visit.          PHYSICAL EXAMINATION:  Visit Vitals    BP (!) 162/92    Pulse 80    Temp 98.1 °F (36.7 °C) (Oral)    Resp 16    Ht 5' 3\" (1.6 m)    Wt 295 lb 12.8 oz (134.2 kg)    SpO2 98%    BMI 52.4 kg/m2        ORTHO EXAMINATION:  Examination Right shoulder Left shoulder   Skin Intact Intact   Effusion - -   Biceps deformity - -   Atrophy - -   AC joint tenderness - -   Acromial tenderness + +   Biceps tenderness - -   Forward flexion/Elevation  170, with crepitation   Active abduction  170   External rotation ROM 30 30   Internal rotation ROM 95 95   Apprehension - -   Impingement - -   Drop Arm Test - -   Neurovascular Intact Intact     Examination Right Ankle/Foot Left Ankle/Foot   Skin Intact Intact   Swelling - +, anterior tibiofibular ligament   Dorsiflexion 10 5   Plantarflexion 25 55   Deformity - -   Inversion laxity - -   Anterior drawer - -   Medial tenderness - -   Lateral tenderness - +, anterior tibiofibular ligament   Heel cord Intact Intact   Sensation Intact Intact   Bunion - -   Toe nails Normal Normal   Capillary refill Normal Normal   2 + pretibial edema   Examination Left knee Right knee   Skin Intact Intact   Range of motion 90-5 90-5   Effusion - -   Medial joint line tenderness + +   Lateral joint line tenderness - -   Popliteal tenderness - -   Osteophytes palpable + +   Yazans - -   Patella crepitus + +   Anterior drawer - -   Lateral laxity - -   Medial laxity - -   Varus deformity - -   Valgus deformity - -   Pretibial edema - -   Calf tenderness - -     PROCEDURE: After discussing treatment options, patient's left shoulder was injected with 4 cc Marcaine and 1/2 cc Celestone. Chart reviewed for the following:   Kena Escobedo MD, have reviewed the History, Physical and updated the Allergic reactions for [address] [place] [address] [address] performed immediately prior to start of procedure:  Kena Escobedo MD, have performed the following reviews on Lisa Felton prior to the start of the procedure:            * Patient was identified by name and date of birth   * Agreement on procedure being performed was verified  * Risks and Benefits explained to the patient  * Procedure site verified and marked as necessary  * Patient was positioned for comfort  * Consent was obtained     Time: 10:47 AM     Date of procedure: 3/20/2018    Procedure performed by:  Ron Diego MD    Ms. Mitra Medrano tolerated the procedure well with no complications. MRI LEFT KNEE WO CONT 9/21/16  Impression:  Focally macerated posterior horn of the medial meniscus between the meniscal  root and meniscal body, probably near complete tear, with some foreshortening  and thickening of the meniscal root evident. Joint effusion. Slitlike Baker's cyst.  Mild degenerative change of the medial joint space. RADIOGRAPHS:   XR BILATERAL SHOULDER 3/20/18  IMPRESSION:  Three views - No fractures, no acromioclavicular narrowing, no glenohumeral narrowing, no calcific densities. XR LEFT ANKLE 3/20/18  IMPRESSION:  Three views - No fractures, no degenerative changes.     XR KNEE PREVIOUS  Previous x-rays show moderate medial joint space narrowing. No fractures. IMPRESSION:      ICD-10-CM ICD-9-CM    1. Chronic right shoulder pain M25.511 719.41 AMB POC XRAY, SHOULDER; COMPLETE, 2+    G89.29 338.29    2. Chronic left shoulder pain M25.512 719.41 AMB POC XRAY, SHOULDER; COMPLETE, 2+    G89.29 338.29 betamethasone (CELESTONE SOLUSPAN) 6 mg/mL injection      BETAMETHASONE ACETATE & SODIUM PHOSPHATE INJECTION 3 MG EA.      DRAIN/INJECT LARGE JOINT/BURSA      bupivacaine, PF, (MARCAINE, PF,) 0.25 % (2.5 mg/mL) injection   3. Chronic pain of left ankle M25.572 719.47 AMB POC XRAY, ANKLE; COMPLETE, 3+ VIE    G89.29 338.29    4. Chronic shoulder bursitis, right M75.51 726.10    5. Chronic shoulder bursitis, left M75.52 726.10 betamethasone (CELESTONE SOLUSPAN) 6 mg/mL injection      BETAMETHASONE ACETATE & SODIUM PHOSPHATE INJECTION 3 MG EA.      DRAIN/INJECT LARGE JOINT/BURSA      bupivacaine, PF, (MARCAINE, PF,) 0.25 % (2.5 mg/mL) injection   6. Sprain of left ankle, unspecified ligament, initial encounter S93.402A 845.00    7. BMI 50.0-59.9, adult [facility]) Z68.43 V85.43      PLAN: After discussing treatment options, patient's left shoulder was injected with 4 cc Marcaine and 1/2 cc Celestone. She will follow up as needed. There is no need for ortho surgery at this time. Weight loss options were discussed today. She was referred for bariatric surgery.      Scribed by Tere Black ([address]) as dictated by Barrington Cruz MD

## 2018-03-20 NOTE — MR AVS SNAPSHOT
[address] [place] [address], [address] [facility] [address] 
[phone] Patient: Kristie Nguyen MRN:  [id number] Visit Information Date & Time Provider Department Dept. Phone Encounter #  
 3/20/2018  9:45 AM Garrett Gurrola MD [facility] [place] [phone] Follow-up Instructions Return if symptoms worsen or fail to improve. Upcoming Health Maintenance Date Due Hepatitis C Screening 1965 DTaP/Tdap/Td series (1 - Tdap) 2/25/1986 BREAST CANCER SCRN MAMMOGRAM 2/25/2015 FOBT Q 1 YEAR AGE 50-75 2/25/2015 PAP AKA CERVICAL CYTOLOGY 2/3/2017 Influenza Age 5 to Adult 8/1/2017 Allergies as of 3/20/2018  Review Complete On: 3/20/2018 By: Yanci Gama No Known Allergies Current Immunizations  Reviewed on 11/29/2016 No immunizations on file. Not reviewed this visit You Were Diagnosed With   
  
 Codes Comments Chronic right shoulder pain    -  Primary ICD-10-CM: M25.511, G89.29 ICD-9-CM: 719.41, 338.29 Chronic left shoulder pain     ICD-10-CM: M25.512, G89.29 ICD-9-CM: 719.41, 338.29 Chronic pain of left ankle     ICD-10-CM: M25.572, G89.29 ICD-9-CM: 719.47, 338.29 Chronic shoulder bursitis, right     ICD-10-CM: M75.51 
ICD-9-CM: 726.10 Vitals BP Pulse Temp Resp Height(growth percentile) Weight(growth percentile) (!) 162/92 80 98.1 °F (36.7 °C) (Oral) 16 5' 3\" (1.6 m) 295 lb 12.8 oz (134.2 kg) SpO2 BMI OB Status Smoking Status 98% 52.4 kg/m2 Unknown Never Smoker BMI and BSA Data Body Mass Index Body Surface Area 52.4 kg/m 2 2.44 m 2 Preferred Pharmacy Pharmacy Name Phone [facility] [address] [place] [address], [address] [place] [phone] Your Updated Medication List  
  
   
This list is accurate as of 3/20/18 10:50 AM.  Always use your most recent med list.  
  
  
  
  
 clobetasol 0.05 % topical gel Commonly known as:  TEMOVATE  
  
 ferrous sulfate 325 mg (65 mg iron) tablet TAKE ONE TABLET BY MOUTH THREE TIMES DAILY WITH MEALS  
  
 ibuprofen 800 mg tablet Commonly known as:  MOTRIN  
TAKE ONE TABLET BY MOUTH THREE TIMES DAILY losartan-hydroCHLOROthiazide 100-12.5 mg per tablet Commonly known as:  HYZAAR  
TAKE ONE TABLET BY MOUTH ONCE DAILY  
  
 omeprazole 40 mg capsule Commonly known as:  PRILOSEC Take 1 Cap by mouth daily. simvastatin 10 mg tablet Commonly known as:  ZOCOR  
TAKE ONE TABLET BY MOUTH NIGHTLY  
  
 VITAMIN D3 1,000 unit Cap Generic drug:  cholecalciferol Take  by mouth daily. We Performed the Following AMB POC XRAY, ANKLE; COMPLETE, 3+ VIE [76598 CPT(R)] AMB POC XRAY, SHOULDER; COMPLETE, 2+ [45967 CPT(R)] AMB POC XRAY, SHOULDER; COMPLETE, 2+ [11197 CPT(R)] Follow-up Instructions Return if symptoms worsen or fail to improve. Patient Instructions Shoulder Bursitis: Exercises Your Care Instructions Here are some examples of typical rehabilitation exercises for your condition. Start each exercise slowly. Ease off the exercise if you start to have pain. Your doctor or physical therapist will tell you when you can start these exercises and which ones will work best for you. How to do the exercises Posterior stretching exercise 1. Hold the elbow of your injured arm with your other hand. 2. Use your hand to pull your injured arm gently up and across your body. You will feel a gentle stretch across the back of your injured shoulder. 3. Hold for at least 15 to 30 seconds. Then slowly lower your arm. 4. Repeat 2 to 4 times. Up-the-back stretch Your doctor or physical therapist may want you to wait to do this stretch until you have regained most of your range of motion and strength.  You can do this stretch in different ways. Hold any of these stretches for at least 15 to 30 seconds. Repeat them 2 to 4 times. 1. Put your hand in your back pocket. Let it rest there to stretch your shoulder. 2. With your other hand, hold your injured arm (palm outward) behind your back by the wrist. Pull your arm up gently to stretch your shoulder. 3. Next, put a towel over your other shoulder. Put the hand of your injured arm behind your back. Now hold the back end of the towel. With the other hand, hold the front end of the towel in front of your body. Pull gently on the front end of the towel. This will bring your hand farther up your back to stretch your shoulder. Overhead stretch 1. Standing about an arm's length away, grasp onto a solid surface. You could use a countertop, a doorknob, or the back of a sturdy chair. 2. With your knees slightly bent, bend forward with your arms straight. Lower your upper body, and let your shoulders stretch. 3. As your shoulders are able to stretch farther, you may need to take a step or two backward. 4. Hold for at least 15 to 30 seconds. Then stand up and relax. If you had stepped back during your stretch, step forward so you can keep your hands on the solid surface. 5. Repeat 2 to 4 times. Shoulder flexion (lying down) To make a wand for this exercise, use a piece of PVC pipe or a broom handle with the broom removed. Make the wand about a foot wider than your shoulders. 1. Lie on your back, holding a wand with both hands. Your palms should face down as you hold the wand. 2. Keeping your elbows straight, slowly raise your arms over your head. Raise them until you feel a stretch in your shoulders, upper back, and chest. 
3. Hold for 15 to 30 seconds. 4. Repeat 2 to 4 times. Shoulder rotation (lying down) To make a wand for this exercise, use a piece of PVC pipe or a broom handle with the broom removed. Make the wand about a foot wider than your shoulders. 1. Lie on your back. Hold a wand with both hands with your elbows bent and palms up. 2. Keep your elbows close to your body, and move the wand across your body toward the sore arm. 3. Hold for 8 to 12 seconds. 4. Repeat 2 to 4 times. Shoulder blade squeeze 1. Stand with your arms at your sides, and squeeze your shoulder blades together. Do not raise your shoulders up as you squeeze. 2. Hold 6 seconds. 3. Repeat 8 to 12 times. Shoulder flexor and extensor exercise These are isometric exercises. That means you contract your muscles without actually moving. 1. Push forward (flex): Stand facing a wall or doorjamb, about 6 inches or less back. Hold your injured arm against your body. Make a closed fist with your thumb on top. Then gently push your hand forward into the wall with about 25% to 50% of your strength. Don't let your body move backward as you push. Hold for about 6 seconds. Relax for a few seconds. Repeat 8 to 12 times. 2. Push backward (extend): Stand with your back flat against a wall. Your upper arm should be against the wall, with your elbow bent 90 degrees (your hand straight ahead). Push your elbow gently back against the wall with about 25% to 50% of your strength. Don't let your body move forward as you push. Hold for about 6 seconds. Relax for a few seconds. Repeat 8 to 12 times. Scapular exercise: Wall push-ups This exercise is best done with your fingers somewhat turned out, rather than straight up and down. 1. Stand facing a wall, about 12 inches to 18 inches away. 2. Place your hands on the wall at shoulder height. 3. Slowly bend your elbows and bring your face to the wall. Keep your back and hips straight. 4. Push back to where you started. 5. Repeat 8 to 12 times. 6. When you can do this exercise against a wall comfortably, you can try it against a counter. You can then slowly progress to the end of a couch, then to a sturdy chair, and finally to the floor. Scapular exercise: Retraction For this exercise, you will need elastic exercise material, such as surgical tubing or Thera-Band. 1. Put the band around a solid object at about waist level. (A bedpost will work well.) Each hand should hold an end of the band. 2. With your elbows at your sides and bent to 90 degrees, pull the band back. Your shoulder blades should move toward each other. Then move your arms back where you started. 3. Repeat 8 to 12 times. 4. If you have good range of motion in your shoulders, try this exercise with your arms lifted out to the sides. Keep your elbows at a 90-degree angle. Raise the elastic band up to about shoulder level. Pull the band back to move your shoulder blades toward each other. Then move your arms back where you started. Internal rotator strengthening exercise 1. Start by tying a piece of elastic exercise material to a doorknob. You can use surgical tubing or Thera-Band. 2. Stand or sit with your shoulder relaxed and your elbow bent 90 degrees. Your upper arm should rest comfortably against your side. Squeeze a rolled towel between your elbow and your body for comfort. This will help keep your arm at your side. 3. Hold one end of the elastic band in the hand of the painful arm. 4. Slowly rotate your forearm toward your body until it touches your belly. Slowly move it back to where you started. 5. Keep your elbow and upper arm firmly tucked against the towel roll or at your side. 6. Repeat 8 to 12 times. External rotator strengthening exercise 1. Start by tying a piece of elastic exercise material to a doorknob. You can use surgical tubing or Thera-Band. (You may also hold one end of the band in each hand.) 2. Stand or sit with your shoulder relaxed and your elbow bent 90 degrees. Your upper arm should rest comfortably against your side. Squeeze a rolled towel between your elbow and your body for comfort. This will help keep your arm at your side. 3. Hold one end of the elastic band with the hand of the painful arm. 4. Start with your forearm across your belly. Slowly rotate the forearm out away from your body. Keep your elbow and upper arm tucked against the towel roll or the side of your body until you begin to feel tightness in your shoulder. Slowly move your arm back to where you started. 5. Repeat 8 to 12 times. Follow-up care is a key part of your treatment and safety. Be sure to make and go to all appointments, and call your doctor if you are having problems. It's also a good idea to know your test results and keep a list of the medicines you take. Where can you learn more? Go to http://marcos-stacy.info/. Enter R329 in the search box to learn more about \"Shoulder Bursitis: Exercises. \" Current as of: March 21, 2017 Content Version: 11.4 © 7310-2505 [employer]. Care instructions adapted under license by [employer] (which disclaims liability or warranty for this information). If you have questions about a medical condition or this instruction, always ask your healthcare professional. [place] [address] any warranty or liability for your use of this information. Ankle Sprain: Rehab Exercises Your Care Instructions Here are some examples of typical rehabilitation exercises for your condition. Start each exercise slowly. Ease off the exercise if you start to have pain. Your doctor or physical therapist will tell you when you can start these exercises and which ones will work best for you. How to do the exercises \"Alphabet\" exercise 5. Trace the alphabet with your toe. This helps your ankle move in all directions. Side-to-side knee swing exercise 4. Sit in a chair with your foot flat on the floor. 5. Slowly move your knee from side to side. Keep your foot pressed flat. 6. Continue this exercise for 2 to 3 minutes. Towel curl 6. While sitting, place your foot on a towel on the floor. Scrunch the towel toward you with your toes. 7. Then use your toes to push the towel away from you. 8. To make this exercise more challenging you can put something on the other end of the towel. A can of soup is about the right weight for this. Towel stretch 5. Sit with your legs extended and knees straight. 6. Place a towel around your foot just under the toes. 7. Hold each end of the towel in each hand, with your hands above your knees. 8. Pull back with the towel so that your foot stretches toward you. 9. Hold the position for at least 15 to 30 seconds. 10. Repeat 2 to 4 times a session. Do up to 5 sessions a day. Ankle eversion exercise 5. Start by sitting with your foot flat on the floor. Push your foot outward against a wall or a piece of furniture that doesn't move. Hold for about 6 seconds, and relax. Repeat 8 to 12 times. 6. After you feel comfortable with this, try using rubber tubing looped around the outside of your feet for resistance. Push your foot out to the side against the tubing, and then count to 10 as you slowly bring your foot back to the middle. Repeat 8 to 12 times. Isometric opposition exercises 4. While sitting, put your feet together flat on the floor. 5. Press your injured foot inward against your other foot. Hold for about 6 seconds, and relax. Repeat 8 to 12 times. 6. Then place the heel of your other foot on top of the injured one. Push down with the top heel while trying to push up with your injured foot. Hold for about 6 seconds, and relax. Repeat 8 to 12 times. Resisted ankle inversion 3. Sit on the floor with your good leg crossed over your other leg. 4. Hold both ends of an exercise band and loop the band around the inside of your affected foot. Then press your other foot against the band.  
5. Keeping your legs crossed, slowly push your affected foot against the band so that foot moves away from your other foot. Then slowly relax. 6. Repeat 8 to 12 times. Resisted ankle eversion 7. Sit on the floor with your legs straight. 8. Hold both ends of an exercise band and loop the band around the outside of your affected foot. Then press your other foot against the band. 9. Keeping your leg straight, slowly push your affected foot outward against the band and away from your other foot without letting your leg rotate. Then slowly relax. 10. Repeat 8 to 12 times. Resisted ankle dorsiflexion 5. Tie the ends of an exercise band together to form a loop. Attach one end of the loop to a secure object or shut a door on it to hold it in place. (Or you can have someone hold one end of the loop to provide resistance.) 6. While sitting on the floor or in a chair, loop the other end of the band over the top of your affected foot. 7. Keeping your knee and leg straight, slowly flex your foot to pull back on the exercise band, and then slowly relax. 8. Repeat 8 to 12 times. Single-leg balance 7. Stand on a flat surface with your arms stretched out to your sides like you are making the letter \"T. \" Then lift your good leg off the floor, bending it at the knee. If you are not steady on your feet, use one hand to hold on to a chair, counter, or wall. 8. Standing on the leg with your affected ankle, keep that knee straight. Try to balance on that leg for up to 30 seconds. Then rest for up to 10 seconds. 9. Repeat 6 to 8 times. 10. When you can balance on your affected leg for 30 seconds with your eyes open, try to balance on it with your eyes closed. 11. When you can do this exercise with your eyes closed for 30 seconds and with ease and no pain, try standing on a pillow or piece of foam, and repeat steps 1 through 4. Follow-up care is a key part of your treatment and safety.  Be sure to make and go to all appointments, and call your doctor if you are having problems. It's also a good idea to know your test results and keep a list of the medicines you take. Where can you learn more? Go to http://marcos-stacy.info/. Padma Mancia in the search box to learn more about \"Ankle Sprain: Rehab Exercises. \" Current as of: March 21, 2017 Content Version: 11.4 © 0681-9438 [employer]. Care instructions adapted under license by [employer] (which disclaims liability or warranty for this information). If you have questions about a medical condition or this instruction, always ask your healthcare professional. [place] [address] any warranty or liability for your use of this information. Introducing [facility]! Dear Elkin Gupta: 
Thank you for requesting a [employer] account. Our records indicate that you already have an active [employer] account. You can access your account anytime at [email or web address] Did you know that you can access your hospital and ER discharge instructions at any time in PraX[employer]? You can also review all of your test results from your hospital stay or ER visit. Additional Information If you have questions, please visit the Frequently Asked Questions section of the [employer] website at [email or web address] Remember, PraXcell is NOT to be used for urgent needs. For medical emergencies, dial 911. Now available from your iPhone and Android! Please provide this summary of care documentation to your next provider. Your primary care clinician is listed as [address]. If you have any questions after today's visit, please call [phone].

## 2018-04-05 ENCOUNTER — OFFICE VISIT (OUTPATIENT)
Dept: FAMILY MEDICINE CLINIC | Age: 53
End: 2018-04-05

## 2018-04-05 VITALS
SYSTOLIC BLOOD PRESSURE: 160 MMHG | TEMPERATURE: 98.2 F | RESPIRATION RATE: 20 BRPM | OXYGEN SATURATION: 98 % | DIASTOLIC BLOOD PRESSURE: 94 MMHG | WEIGHT: 293 LBS | HEART RATE: 77 BPM | BODY MASS INDEX: 51.91 KG/M2 | HEIGHT: 63 IN

## 2018-04-05 DIAGNOSIS — R73.09 ELEVATED HEMOGLOBIN A1C: Primary | ICD-10-CM

## 2018-04-05 DIAGNOSIS — I10 ESSENTIAL HYPERTENSION: ICD-10-CM

## 2018-04-05 DIAGNOSIS — D64.89 ANEMIA DUE TO OTHER CAUSE, NOT CLASSIFIED: ICD-10-CM

## 2018-04-05 RX ORDER — AMLODIPINE BESYLATE 5 MG/1
5 TABLET ORAL DAILY
Qty: 90 TAB | Refills: 3 | Status: SHIPPED | OUTPATIENT
Start: 2018-04-05 | End: 2019-04-18 | Stop reason: SDUPTHER

## 2018-04-05 RX ORDER — SIMVASTATIN 10 MG/1
TABLET, FILM COATED ORAL
Qty: 90 TAB | Refills: 3 | Status: SHIPPED | OUTPATIENT
Start: 2018-04-05 | End: 2019-04-27 | Stop reason: SDUPTHER

## 2018-04-05 RX ORDER — OMEPRAZOLE 40 MG/1
40 CAPSULE, DELAYED RELEASE ORAL DAILY
Qty: 90 CAP | Refills: 1 | Status: SHIPPED | OUTPATIENT
Start: 2018-04-05 | End: 2018-11-02 | Stop reason: SDUPTHER

## 2018-04-05 RX ORDER — LOSARTAN POTASSIUM AND HYDROCHLOROTHIAZIDE 12.5; 1 MG/1; MG/1
TABLET ORAL
Qty: 90 TAB | Refills: 3 | Status: SHIPPED | OUTPATIENT
Start: 2018-04-05 | End: 2019-04-27 | Stop reason: SDUPTHER

## 2018-04-05 NOTE — PROGRESS NOTES
HISTORY OF PRESENT ILLNESS  Kandy Feliz is a 48 y.o. female. HPI  Patient is here today for evaluation and treatment of: Hypertension    Hypertension:  BP is elevated. She is aware she needs to lose weight. She is on Hyzaar; Pt tolerates med well; She has no new complaints. She has plans to start her weight loss efforts. A1c is elevated; needs a recheck . Pt has a h/o anemia; needs a recheck . Current Outpatient Prescriptions:     ibuprofen (MOTRIN) 800 mg tablet, Take 1 Tab by mouth three (3) times daily (with meals). (Patient taking differently: Take 800 mg by mouth three (3) times daily (with meals). Take 1 tablet by mouth at bedtime.), Disp: 90 Tab, Rfl: 0    losartan-hydroCHLOROthiazide (HYZAAR) 100-12.5 mg per tablet, TAKE ONE TABLET BY MOUTH ONCE DAILY, Disp: 30 Tab, Rfl: 6    omeprazole (PRILOSEC) 40 mg capsule, Take 1 Cap by mouth daily. , Disp: 30 Cap, Rfl: 3    simvastatin (ZOCOR) 10 mg tablet, TAKE ONE TABLET BY MOUTH NIGHTLY, Disp: 90 Tab, Rfl: 3    clobetasol (TEMOVATE) 0.05 % topical gel, , Disp: , Rfl:     ferrous sulfate 325 mg (65 mg iron) tablet, TAKE ONE TABLET BY MOUTH THREE TIMES DAILY WITH MEALS, Disp: 100 Tab, Rfl: 6    cholecalciferol (VITAMIN D3) 1,000 unit cap, Take  by mouth daily. , Disp: , Rfl:     PMH,  Meds, Allergies, Family History, Social history reviewed    Review of Systems   Constitutional: Negative for chills and fever. Respiratory: Negative for shortness of breath and wheezing. Cardiovascular: Negative for chest pain and palpitations. Physical Exam   Constitutional: She appears well-developed and well-nourished. No distress. Cardiovascular: Normal rate and regular rhythm. Exam reveals no gallop and no friction rub. No murmur heard. Pulmonary/Chest: Breath sounds normal. No respiratory distress. She has no wheezes. She has no rales. Nursing note and vitals reviewed.      Visit Vitals    BP (!) 160/94 (BP 1 Location: Right arm, BP Patient Position: Sitting)    Pulse 77    Temp 98.2 °F (36.8 °C) (Oral)    Resp 20    Ht 5' 3\" (1.6 m)    Wt 301 lb (136.5 kg)    SpO2 98%    BMI 53.32 kg/m2         ASSESSMENT and PLAN    ICD-10-CM ICD-9-CM    1. Elevated hemoglobin A1c R73.09 790.29 HEMOGLOBIN A1C WITH EAG   2. Essential hypertension I10 401.9 losartan-hydroCHLOROthiazide (HYZAAR) 100-12.5 mg per tablet      LIPID PANEL      METABOLIC PANEL, BASIC   3. Anemia due to other cause, not classified D64.89 285.8 CBC WITH AUTOMATED DIFF       As above, not controlled   treatment plan as listed below  Orders Placed This Encounter    LIPID PANEL    METABOLIC PANEL, BASIC    CBC WITH AUTOMATED DIFF    HEMOGLOBIN A1C WITH EAG    losartan-hydroCHLOROthiazide (HYZAAR) 100-12.5 mg per tablet    omeprazole (PRILOSEC) 40 mg capsule    simvastatin (ZOCOR) 10 mg tablet    amLODIPine (NORVASC) 5 mg tablet     Add norvasc as ordered  Labs as ordered  Advised diet and exercise to control BP, sugar  Follow-up Disposition:  Return in about 2 months (around 6/5/2018) for HTN/prediabetes. An After Visit Summary was printed and given to the patient. This has been fully explained to the patient, who indicates understanding.

## 2018-04-05 NOTE — PROGRESS NOTES
1. Have you been to the ER, urgent care clinic since your last visit? Hospitalized since your last visit? No    2. Have you seen or consulted any other health care providers outside of the [address] [place] [address] since your last visit? Include any pap smears or colon screening.  No

## 2018-04-05 NOTE — MR AVS SNAPSHOT
[facility] 
 
 
 [address] [address] [address] 
[phone] Patient: Alonzo Bui MRN:  [id number] Visit Information Date & Time Provider Department Dept. Phone Encounter #  
 4/5/2018  5:20 PM Tena Vergara, [address] [place] [address] [phone] [id number] Follow-up Instructions Return in about 2 months (around 6/5/2018) for HTN/prediabetes. Upcoming Health Maintenance Date Due Hepatitis C Screening 1965 DTaP/Tdap/Td series (1 - Tdap) 2/25/1986 BREAST CANCER SCRN MAMMOGRAM 2/25/2015 FOBT Q 1 YEAR AGE 50-75 2/25/2015 PAP AKA CERVICAL CYTOLOGY 2/3/2017 Influenza Age 5 to Adult 8/1/2017 Allergies as of 4/5/2018  Review Complete On: 4/5/2018 By: Angela Leal LPN Severity Noted Reaction Type Reactions Lisinopril  04/05/2018    Cough Current Immunizations  Reviewed on 11/29/2016 No immunizations on file. Not reviewed this visit You Were Diagnosed With   
  
 Codes Comments Elevated hemoglobin A1c    -  Primary ICD-10-CM: R73.09 
ICD-9-CM: 790.29 Essential hypertension     ICD-10-CM: I10 
ICD-9-CM: 401.9 Anemia due to other cause, not classified     ICD-10-CM: D64.89 ICD-9-CM: 421. 8 Vitals BP Pulse Temp Resp Height(growth percentile) Weight(growth percentile) (!) 160/94 (BP 1 Location: Right arm, BP Patient Position: Sitting) 77 98.2 °F (36.8 °C) (Oral) 20 5' 3\" (1.6 m) 301 lb (136.5 kg) SpO2 BMI OB Status Smoking Status 98% 53.32 kg/m2 Unknown Never Smoker Vitals History BMI and BSA Data Body Mass Index Body Surface Area  
 53.32 kg/m 2 2.46 m 2 Preferred Pharmacy Pharmacy Name Phone [address] [place] [address], [address] [place] [address] [phone] Your Updated Medication List  
  
   
This list is accurate as of 4/5/18  7:44 PM.  Always use your most recent med list. amLODIPine 5 mg tablet Commonly known as:  Sheron Sharma Take 1 Tab by mouth daily. clobetasol 0.05 % topical gel Commonly known as:  TEMOVATE  
  
 ferrous sulfate 325 mg (65 mg iron) tablet TAKE ONE TABLET BY MOUTH THREE TIMES DAILY WITH MEALS  
  
 ibuprofen 800 mg tablet Commonly known as:  MOTRIN Take 1 Tab by mouth three (3) times daily (with meals). losartan-hydroCHLOROthiazide 100-12.5 mg per tablet Commonly known as:  HYZAAR  
TAKE ONE TABLET BY MOUTH ONCE DAILY  
  
 omeprazole 40 mg capsule Commonly known as:  PRILOSEC Take 1 Cap by mouth daily. simvastatin 10 mg tablet Commonly known as:  ZOCOR  
TAKE ONE TABLET BY MOUTH NIGHTLY  
  
 VITAMIN D3 1,000 unit Cap Generic drug:  cholecalciferol Take  by mouth daily. Prescriptions Sent to Pharmacy Refills  
 losartan-hydroCHLOROthiazide (HYZAAR) 100-12.5 mg per tablet 3 Sig: TAKE ONE TABLET BY MOUTH ONCE DAILY Class: Normal  
 Pharmacy: [address] [place] [address] Ph #: [phone]  
 omeprazole (PRILOSEC) 40 mg capsule 1 Sig: Take 1 Cap by mouth daily. Class: Normal  
 Pharmacy: [address] [place] [address], [facility] Ph #: [phone] Route: Oral  
 simvastatin (ZOCOR) 10 mg tablet 3 Sig: TAKE ONE TABLET BY MOUTH NIGHTLY Class: Normal  
 Pharmacy: [address] [place] [address], [facility] Ph #: [phone]  
 amLODIPine (NORVASC) 5 mg tablet 3 Sig: Take 1 Tab by mouth daily. Class: Normal  
 Pharmacy: [address] [place] [address], [address] [facility] Ph #: [phone] Route: Oral  
  
Follow-up Instructions Return in about 2 months (around 6/5/2018) for HTN/prediabetes. To-Do List   
 04/26/2018 Lab:  CBC WITH AUTOMATED DIFF   
  
 04/26/2018 Lab:  HEMOGLOBIN A1C WITH EAG   
  
 04/26/2018 Lab: LIPID PANEL   
  
 04/26/2018 Lab:  METABOLIC PANEL, BASIC Patient Instructions Hemoglobin A1c: About This Test 
What is it? Hemoglobin A1c is a blood test that checks your average blood sugar level over the past 2 to 3 months. This test also is called a glycohemoglobin test or an A1c test. 
Why is this test done? The A1c test is done to check how well your diabetes has been controlled over the past 2 to 3 months. Your doctor can use this information to adjust your medicine and diabetes treatment, if needed. How can you prepare for the test? 
You do not need to stop eating before you have an A1c test. This test can be done at any time during the day, even after a meal. 
What happens during the test? 
The health professional taking a sample of your blood will: · Wrap an elastic band around your upper arm. This makes the veins below the band larger so it is easier to put a needle into the vein. · Clean the needle site with alcohol. · Put the needle into the vein. · Attach a tube to the needle to fill it with blood. · Remove the band from your arm when enough blood is collected. · Put a gauze pad or cotton ball over the needle site as the needle is removed. · Put pressure on the site and then put on a bandage. What else should you know about the test? 
The test result is usually given as a percentage. The normal A1c is less than 5.7%. The A1c test result also can be used to find your estimated average glucose, or eAG. Your eAG and A1c show the same thing in two different ways. They both help you learn more about your average blood sugar range over the past 2 to 3 months. Where can you learn more? Go to http://marcos-stacy.info/. Enter U216 in the search box to learn more about \"Hemoglobin A1c: About This Test.\" Current as of: March 13, 2017 Content Version: 11.4 © 7608-9486 Healthwise, Incorporated.  Care instructions adapted under license by [address] (which disclaims liability or warranty for this information). If you have questions about a medical condition or this instruction, always ask your healthcare professional. [address] any warranty or liability for your use of this information. Introducing [facility] & HEALTH SERVICES! Dear Bj Baez: 
Thank you for requesting a SafetyTat account. Our records indicate that you already have an active SafetyTat account. You can access your account anytime at [email or web address] Did you know that you can access your hospital and ER discharge instructions at any time in SafetyTat? You can also review all of your test results from your hospital stay or ER visit. Additional Information If you have questions, please visit the Frequently Asked Questions section of the SafetyTat website at [email or web address] Remember, SafetyTat is NOT to be used for urgent needs. For medical emergencies, dial 911. Now available from your iPhone and Android! Please provide this summary of care documentation to your next provider. Your primary care clinician is listed as [address]. If you have any questions after today's visit, please call [phone].

## 2018-04-05 NOTE — PATIENT INSTRUCTIONS
Hemoglobin A1c: About This Test  What is it? Hemoglobin A1c is a blood test that checks your average blood sugar level over the past 2 to 3 months. This test also is called a glycohemoglobin test or an A1c test.  Why is this test done? The A1c test is done to check how well your diabetes has been controlled over the past 2 to 3 months. Your doctor can use this information to adjust your medicine and diabetes treatment, if needed. How can you prepare for the test?  You do not need to stop eating before you have an A1c test. This test can be done at any time during the day, even after a meal.  What happens during the test?  The health professional taking a sample of your blood will:  · Wrap an elastic band around your upper arm. This makes the veins below the band larger so it is easier to put a needle into the vein. · Clean the needle site with alcohol. · Put the needle into the vein. · Attach a tube to the needle to fill it with blood. · Remove the band from your arm when enough blood is collected. · Put a gauze pad or cotton ball over the needle site as the needle is removed. · Put pressure on the site and then put on a bandage. What else should you know about the test?  The test result is usually given as a percentage. The normal A1c is less than 5.7%. The A1c test result also can be used to find your estimated average glucose, or eAG. Your eAG and A1c show the same thing in two different ways. They both help you learn more about your average blood sugar range over the past 2 to 3 months. Where can you learn more? Go to http://marcos-stacy.info/. Enter U216 in the search box to learn more about \"Hemoglobin A1c: About This Test.\"  Current as of: March 13, 2017  Content Version: 11.4  © 4095-7418 [employer]. Care instructions adapted under license by [employer] (which disclaims liability or warranty for this information).  If you have questions about a medical condition or this instruction, always ask your healthcare professional. [address] [address] any warranty or liability for your use of this information.

## 2018-05-04 ENCOUNTER — HOSPITAL ENCOUNTER (OUTPATIENT)
Dept: LAB | Age: 53
Discharge: HOME | End: 2018-05-04
Payer: COMMERCIAL

## 2018-05-04 DIAGNOSIS — I10 ESSENTIAL HYPERTENSION: ICD-10-CM

## 2018-05-04 DIAGNOSIS — D64.89 ANEMIA DUE TO OTHER CAUSE, NOT CLASSIFIED: ICD-10-CM

## 2018-05-04 DIAGNOSIS — R73.09 ELEVATED HEMOGLOBIN A1C: ICD-10-CM

## 2018-05-04 LAB
ANION GAP SERPL CALC-SCNC: 6 MMOL/L (ref 3–18)
BASOPHILS # BLD: 0 K/UL (ref 0–0.06)
BASOPHILS NFR BLD: 0 % (ref 0–2)
BUN SERPL-MCNC: 14 MG/DL (ref 7–18)
BUN/CREAT SERPL: 22 (ref 12–20)
CALCIUM SERPL-MCNC: 8.4 MG/DL (ref 8.5–10.1)
CHLORIDE SERPL-SCNC: 107 MMOL/L (ref 100–108)
CHOLEST SERPL-MCNC: 150 MG/DL
CO2 SERPL-SCNC: 28 MMOL/L (ref 21–32)
CREAT SERPL-MCNC: 0.64 MG/DL (ref 0.6–1.3)
DIFFERENTIAL METHOD BLD: ABNORMAL
EOSINOPHIL # BLD: 0.2 K/UL (ref 0–0.4)
EOSINOPHIL NFR BLD: 3 % (ref 0–5)
ERYTHROCYTE [DISTWIDTH] IN BLOOD BY AUTOMATED COUNT: 16.7 % (ref 11.6–14.5)
EST. AVERAGE GLUCOSE BLD GHB EST-MCNC: 160 MG/DL
GLUCOSE SERPL-MCNC: 112 MG/DL (ref 74–99)
HBA1C MFR BLD: 7.2 % (ref 4.2–5.6)
HCT VFR BLD AUTO: 35.1 % (ref 35–45)
HDLC SERPL-MCNC: 43 MG/DL (ref 40–60)
HDLC SERPL: 3.5 {RATIO} (ref 0–5)
HGB BLD-MCNC: 10.8 G/DL (ref 12–16)
LDLC SERPL CALC-MCNC: 78.4 MG/DL (ref 0–100)
LIPID PROFILE,FLP: NORMAL
LYMPHOCYTES # BLD: 2.2 K/UL (ref 0.9–3.6)
LYMPHOCYTES NFR BLD: 31 % (ref 21–52)
MCH RBC QN AUTO: 24.1 PG (ref 24–34)
MCHC RBC AUTO-ENTMCNC: 30.8 G/DL (ref 31–37)
MCV RBC AUTO: 78.2 FL (ref 74–97)
MONOCYTES # BLD: 0.5 K/UL (ref 0.05–1.2)
MONOCYTES NFR BLD: 7 % (ref 3–10)
NEUTS SEG # BLD: 4.1 K/UL (ref 1.8–8)
NEUTS SEG NFR BLD: 59 % (ref 40–73)
PLATELET # BLD AUTO: 308 K/UL (ref 135–420)
PMV BLD AUTO: 11.8 FL (ref 9.2–11.8)
POTASSIUM SERPL-SCNC: 3.9 MMOL/L (ref 3.5–5.5)
RBC # BLD AUTO: 4.49 M/UL (ref 4.2–5.3)
SODIUM SERPL-SCNC: 141 MMOL/L (ref 136–145)
TRIGL SERPL-MCNC: 143 MG/DL (ref ?–150)
VLDLC SERPL CALC-MCNC: 28.6 MG/DL
WBC # BLD AUTO: 6.9 K/UL (ref 4.6–13.2)

## 2018-05-04 PROCEDURE — 80061 LIPID PANEL: CPT | Performed by: FAMILY MEDICINE

## 2018-05-04 PROCEDURE — 83036 HEMOGLOBIN GLYCOSYLATED A1C: CPT | Performed by: FAMILY MEDICINE

## 2018-05-04 PROCEDURE — 85025 COMPLETE CBC W/AUTO DIFF WBC: CPT | Performed by: FAMILY MEDICINE

## 2018-05-04 PROCEDURE — 36415 COLL VENOUS BLD VENIPUNCTURE: CPT | Performed by: FAMILY MEDICINE

## 2018-05-04 PROCEDURE — 80048 BASIC METABOLIC PNL TOTAL CA: CPT | Performed by: FAMILY MEDICINE

## 2018-05-22 ENCOUNTER — TELEPHONE (OUTPATIENT)
Dept: SURGERY | Age: 53
End: 2018-05-22

## 2018-05-23 ENCOUNTER — HOSPITAL ENCOUNTER (OUTPATIENT)
Dept: LAB | Age: 53
Discharge: HOME | End: 2018-05-23
Payer: COMMERCIAL

## 2018-05-23 ENCOUNTER — DOCUMENTATION ONLY (OUTPATIENT)
Dept: SURGERY | Age: 53
End: 2018-05-23

## 2018-05-23 ENCOUNTER — OFFICE VISIT (OUTPATIENT)
Dept: SURGERY | Age: 53
End: 2018-05-23

## 2018-05-23 VITALS
HEART RATE: 97 BPM | RESPIRATION RATE: 20 BRPM | DIASTOLIC BLOOD PRESSURE: 86 MMHG | BODY MASS INDEX: 51.91 KG/M2 | TEMPERATURE: 98.2 F | SYSTOLIC BLOOD PRESSURE: 130 MMHG | HEIGHT: 63 IN | WEIGHT: 293 LBS

## 2018-05-23 DIAGNOSIS — E55.9 VITAMIN D DEFICIENCY: ICD-10-CM

## 2018-05-23 DIAGNOSIS — E78.00 HYPERCHOLESTEROLEMIA: ICD-10-CM

## 2018-05-23 DIAGNOSIS — G47.30 SLEEP APNEA, UNSPECIFIED TYPE: ICD-10-CM

## 2018-05-23 DIAGNOSIS — I15.9 SECONDARY HYPERTENSION: ICD-10-CM

## 2018-05-23 DIAGNOSIS — E66.01 OBESITY, MORBID (HCC): ICD-10-CM

## 2018-05-23 DIAGNOSIS — Z01.818 PREOP EXAMINATION: ICD-10-CM

## 2018-05-23 DIAGNOSIS — D50.9 IRON DEFICIENCY ANEMIA, UNSPECIFIED IRON DEFICIENCY ANEMIA TYPE: ICD-10-CM

## 2018-05-23 DIAGNOSIS — R73.03 PREDIABETES: ICD-10-CM

## 2018-05-23 DIAGNOSIS — E66.01 OBESITY, MORBID (HCC): Primary | ICD-10-CM

## 2018-05-23 LAB
25(OH)D3 SERPL-MCNC: 28.5 NG/ML (ref 30–100)
ALBUMIN SERPL-MCNC: 3.6 G/DL (ref 3.4–5)
ALBUMIN/GLOB SERPL: 1 {RATIO} (ref 0.8–1.7)
ALP SERPL-CCNC: 89 U/L (ref 45–117)
ALT SERPL-CCNC: 22 U/L (ref 13–56)
ANION GAP SERPL CALC-SCNC: 6 MMOL/L (ref 3–18)
AST SERPL-CCNC: 11 U/L (ref 15–37)
BASOPHILS # BLD: 0 K/UL (ref 0–0.1)
BASOPHILS NFR BLD: 0 % (ref 0–2)
BILIRUB SERPL-MCNC: 0.3 MG/DL (ref 0.2–1)
BUN SERPL-MCNC: 16 MG/DL (ref 7–18)
BUN/CREAT SERPL: 21 (ref 12–20)
CALCIUM SERPL-MCNC: 8.9 MG/DL (ref 8.5–10.1)
CHLORIDE SERPL-SCNC: 105 MMOL/L (ref 100–108)
CO2 SERPL-SCNC: 29 MMOL/L (ref 21–32)
CREAT SERPL-MCNC: 0.78 MG/DL (ref 0.6–1.3)
DIFFERENTIAL METHOD BLD: ABNORMAL
EOSINOPHIL # BLD: 0.1 K/UL (ref 0–0.4)
EOSINOPHIL NFR BLD: 2 % (ref 0–5)
ERYTHROCYTE [DISTWIDTH] IN BLOOD BY AUTOMATED COUNT: 16.7 % (ref 11.6–14.5)
FERRITIN SERPL-MCNC: 25 NG/ML (ref 8–388)
FOLATE SERPL-MCNC: 9.2 NG/ML (ref 3.1–17.5)
GLOBULIN SER CALC-MCNC: 3.6 G/DL (ref 2–4)
GLUCOSE SERPL-MCNC: 101 MG/DL (ref 74–99)
HBA1C MFR BLD: 7 % (ref 4.2–5.6)
HCT VFR BLD AUTO: 35.3 % (ref 35–45)
HGB BLD-MCNC: 11.2 G/DL (ref 12–16)
IRON SERPL-MCNC: 45 UG/DL (ref 50–175)
LYMPHOCYTES # BLD: 2 K/UL (ref 0.9–3.6)
LYMPHOCYTES NFR BLD: 35 % (ref 21–52)
MCH RBC QN AUTO: 23.7 PG (ref 24–34)
MCHC RBC AUTO-ENTMCNC: 31.7 G/DL (ref 31–37)
MCV RBC AUTO: 74.6 FL (ref 74–97)
MONOCYTES # BLD: 0.3 K/UL (ref 0.05–1.2)
MONOCYTES NFR BLD: 4 % (ref 3–10)
NEUTS SEG # BLD: 3.4 K/UL (ref 1.8–8)
NEUTS SEG NFR BLD: 59 % (ref 40–73)
PLATELET # BLD AUTO: 338 K/UL (ref 135–420)
PMV BLD AUTO: 10.5 FL (ref 9.2–11.8)
POTASSIUM SERPL-SCNC: 4 MMOL/L (ref 3.5–5.5)
PROT SERPL-MCNC: 7.2 G/DL (ref 6.4–8.2)
RBC # BLD AUTO: 4.73 M/UL (ref 4.2–5.3)
SODIUM SERPL-SCNC: 140 MMOL/L (ref 136–145)
VIT B12 SERPL-MCNC: 358 PG/ML (ref 211–911)
WBC # BLD AUTO: 5.8 K/UL (ref 4.6–13.2)

## 2018-05-23 PROCEDURE — 82607 VITAMIN B-12: CPT | Performed by: NURSE PRACTITIONER

## 2018-05-23 PROCEDURE — 82306 VITAMIN D 25 HYDROXY: CPT | Performed by: NURSE PRACTITIONER

## 2018-05-23 PROCEDURE — 83540 ASSAY OF IRON: CPT | Performed by: NURSE PRACTITIONER

## 2018-05-23 PROCEDURE — 80053 COMPREHEN METABOLIC PANEL: CPT | Performed by: NURSE PRACTITIONER

## 2018-05-23 PROCEDURE — 84425 ASSAY OF VITAMIN B-1: CPT | Performed by: NURSE PRACTITIONER

## 2018-05-23 PROCEDURE — 85025 COMPLETE CBC W/AUTO DIFF WBC: CPT | Performed by: NURSE PRACTITIONER

## 2018-05-23 PROCEDURE — 82728 ASSAY OF FERRITIN: CPT | Performed by: NURSE PRACTITIONER

## 2018-05-23 PROCEDURE — 36415 COLL VENOUS BLD VENIPUNCTURE: CPT | Performed by: NURSE PRACTITIONER

## 2018-05-23 PROCEDURE — 83036 HEMOGLOBIN GLYCOSYLATED A1C: CPT | Performed by: NURSE PRACTITIONER

## 2018-05-23 NOTE — PROGRESS NOTES
Initial Consultation for Bariatric Surgery     Dolores Correia is a 48 y.o. female who comes into the office today for initial consultation for the surgical options for the treatment of morbid obesity. The patient initially identified obesity around age 27 after the birth of her children and that it came on slowly over time. Dolores Correia has tried a variety of unsupervised weight-loss attempts including Weight Watchers and Nutrisystem, but has yet to meet with lasting success. Maximum weight lost on a diet is about 30 lbs, but that the weight always seems to return. Today, the patient is Height: 5' 3\" (160 cm) , Weight: 135.6 kg (299 lb) for a BMI of Body mass index is 52.97 kg/(m^2). [facility] Maximum weight is 301lbs. It is due to their severe obesity, which is further complicated by  hypertension, GERD, stress urinary incontinence, obstructive sleep apnea - CPAP, weight related arthopathies and 'pre-DM'. that the patient is now seeking out bariatric surgery. Weight Loss Metrics 5/23/2018 5/23/2018 4/5/2018 3/20/2018 7/27/2017 3/28/2017 12/6/2016   Pre op / Initial Wt 299 - - - - - -   Today's Wt - 299 lb 301 lb 295 lb 12.8 oz 291 lb 280 lb 276 lb 7.3 oz   BMI - 52.97 kg/m2 53.32 kg/m2 52.4 kg/m2 51.55 kg/m2 49.6 kg/m2 48.97 kg/m2   Ideal Body Wt 128 - - - - - -   Excess Body Wt 171 - - - - - -   Goal Wt 162 - - - - - -   Wt loss to date 0 - - - - - -   % Wt Loss 0 - - - - - -   80% .8 - - - - - -       Body mass index is 52.97 kg/(m^2).         Past Medical History:   Diagnosis Date    Allergic rhinitis     Anemia     Arthritis     BMI 45.0-49.9, adult (HCC)     49.5    Conjunctivitis, bacterial 1-9-04    left    Fibroids     GERD (gastroesophageal reflux disease)     Hiatal hernia     High cholesterol     Hypercholesterolemia 2-2008    TSH 0.6    Hypertension     Knee pain, left     Pelvic pain     Sleep apnea     Does not use CPAP    Uterine fibroid     Vitamin D deficiencies      (10)          (25)    Vitamin D deficiency        Past Surgical History:   Procedure Laterality Date    COLONOSCOPY N/A 2016    COLONOSCOPY performed by Chuyita Grant MD at  [address] HX  SECTION      2 times    HX  SECTION  97 ; 01    HX COLONOSCOPY      HX DILATION AND CURETTAGE      3 times    HX ENDOSCOPY      HX MYOMECTOMY      HX MYOMECTOMY         Current Outpatient Prescriptions   Medication Sig Dispense Refill    losartan-hydroCHLOROthiazide (HYZAAR) 100-12.5 mg per tablet TAKE ONE TABLET BY MOUTH ONCE DAILY 90 Tab 3    omeprazole (PRILOSEC) 40 mg capsule Take 1 Cap by mouth daily. 90 Cap 1    simvastatin (ZOCOR) 10 mg tablet TAKE ONE TABLET BY MOUTH NIGHTLY 90 Tab 3    amLODIPine (NORVASC) 5 mg tablet Take 1 Tab by mouth daily. 90 Tab 3    ibuprofen (MOTRIN) 800 mg tablet Take 1 Tab by mouth three (3) times daily (with meals). (Patient taking differently: Take 800 mg by mouth three (3) times daily (with meals).  Take 1 tablet by mouth at bedtime.) 90 Tab 0    clobetasol (TEMOVATE) 0.05 % topical gel          Allergies   Allergen Reactions    Lisinopril Cough       Social History   Substance Use Topics    Smoking status: Never Smoker    Smokeless tobacco: Never Used    Alcohol use 0.0 oz/week     2 Glasses of wine, 0 Standard drinks or equivalent per week      Comment: on ocassion       Family History   Problem Relation Age of Onset    Hypertension Mother     Diabetes Maternal Uncle     Breast Cancer Maternal Grandmother     Heart Attack Maternal Grandfather     Hypertension Paternal Grandmother     Cancer Mother      uterine    Cancer Father      brain    Cancer Maternal Aunt      breast    Cancer Maternal Grandmother      breast    Heart Disease Paternal Grandfather     Cancer Cousin      breast       ROS, positive where in bold:    General: fevers, chills, night sweats, fatigue, weight loss, weight gain    GI: abdominal pain, nausea, vomiting, change in bowel habits, hematochezia, melena, GERD, constipation    Integumentary: dermatitis or abnormal moles.     HEENT:  visual changes, vertigo, epistaxis, dysphagia, hoarseness    Cardiac: chest pain, palpitations, hypertension, edema, varicosities    Resp:  cough, shortness of breath, wheezing, hemoptysis, snoring, reactive airway disease    : hematuria, dysuria, frequency, urgency, nocturia, stress urinary incontinence    MSK: weakness, joint pain, arthritis, plantar fascitis     Endocrine: pre-diabetes, thyroid disease, polyuria, polydipsia, polyphagia, poor wound healing, heat intolerance,cold intolerance    Lymph/Heme: anemia, bruising, history of blood transfusions    Neuro: dizziness, headache, fainting, seizures, stroke    Psychiatry: anxiety, depression, psychosis      Physical Exam:  Visit Vitals    /86    Pulse 97    Temp 98.2 °F (36.8 °C)    Resp 20    Ht 5' 3\" (1.6 m)    Wt 135.6 kg (299 lb)    BMI 52.97 kg/m2       General: Well developed, well nourished 48 y.o. female in no acute distress  ENMT: normocephalic, atraumatic mouth:clear, no overt lesions, oral mucosa pink and moist  Neck: supple, no masses, no adenopathy or carotid bruits, trachea midline  Skin: warm, smooth, dry and well perfused  Respiratory: clear to auscultation bilaterally, no wheeze, rhonchi or rales, excursions normal and symmetrical  Cardiovascular: Regular rate and rhythm, no murmurs, clicks, gallops or rubs, no edema or varicosities  Gastrointestinal: soft, nontender, nondistended, normoactive bowel sounds, no hepatosplenomegaly, easily palpable costal margins, gynecoid distribution  Musculoskeletal: warm, well-perfused, no tenderness or swelling, normal gait/station  Neuro: sensation and strength grossly intact and symmetrical  Psych: alert and oriented to person, place and time      Impression:    Neena Junior is a 48 y.o. female who is suffering from morbid obesity and its attendant comorbidities who would benefit from bariatric surgery. We have had an extensive discussion with regard to the risks, benefits and likely outcomes of both the sleeve and the gastric bypass. With regard to bypass, we have discussed the risks including bleeding, infection, need for reoperation, damage to surrounding structure, anastomotic leak, gastrogastric fistula ulcer and stricture, bowel obstruction due to internal hernia or adhesions, DVT, PE, heart attack, stroke and death. Patient also understands risks of inadequate weight loss, excess weight loss, vitamin insufficiency, protein malnutrition, excess skin, and loss of hair. She seems to be a reasonable candidate for gastric bypass. We have discussed the risks, benefits and likely outcomes of LGBP. The patient understands the likelihood of losing approximately 80 % of her excess weight in 12 to 18 months. Patient also understands the risks including but not limited to bleeding, infection, need for reoperation, leaks and strictures, bowel obstruction secondary to adhesions, DVT, PE, heart attack, stroke, and death. Patient also understands risks of inadequate weight loss, excess weight loss, vitamin insufficiency, protein malnutrition, excess skin, and loss of hair. We have reviewed the components of a successful postoperative course including requirement for a high protein, low carbohydrate diet, 60 oz a day of zero calorie liquids, daily vitamin supplementation, daily exercise, regular follow-up, and participation in support groups. At this time we will enroll the patient in our bariatric program, undertake routine laboratory, EKG, evaluation by nutritionist as well as psychologist and pending their satisfactory completion of the preop evaluation, plan to perform LGBP.   She will have further education before a final decision about surgery including the pre-op teaching class and a pre-op visit with Sola Jennings MD. Sarah Albert, FNP-BC

## 2018-05-23 NOTE — PROGRESS NOTES
I called Dr. Jesika Bliss office to schedule sleep study per Dr. Deretha Frankel and Dianne Butler told me to fax over referral sheet and they will call the patient. The patient is aware that they will be calling her to schedule. She was seen by Dr. Mary Orozco office 4 years ago called them to get her to been seen with them and their next available is in July and she has not compiled with wearing the cpap , so that is why patient has two pending appointments and she wants to be seen by whomever can get her in the quickest and she will call Wendy Juarez to let her know who she decided to be seen with.

## 2018-05-23 NOTE — PROGRESS NOTES
Pt ID confirmed    Weight Loss Metrics 5/23/2018 5/23/2018 4/5/2018 3/20/2018 7/27/2017 3/28/2017 12/6/2016   Pre op / Initial Wt 299 - - - - - -   Today's Wt - 299 lb 301 lb 295 lb 12.8 oz 291 lb 280 lb 276 lb 7.3 oz   BMI - 52.97 kg/m2 53.32 kg/m2 52.4 kg/m2 51.55 kg/m2 49.6 kg/m2 48.97 kg/m2   Ideal Body Wt 128 - - - - - -   Excess Body Wt 171 - - - - - -   Goal Wt 162 - - - - - -   Wt loss to date 0 - - - - - -   % Wt Loss 0 - - - - - -   80% .8 - - - - - -       Body mass index is 52.97 kg/(m^2).

## 2018-05-24 ENCOUNTER — DOCUMENTATION ONLY (OUTPATIENT)
Dept: SURGERY | Age: 53
End: 2018-05-24

## 2018-05-26 LAB — VIT B1 BLD-SCNC: 79.6 NMOL/L (ref 66.5–200)

## 2018-05-29 ENCOUNTER — TELEPHONE (OUTPATIENT)
Dept: SURGERY | Age: 53
End: 2018-05-29

## 2018-06-01 ENCOUNTER — TELEPHONE (OUTPATIENT)
Dept: SURGERY | Age: 53
End: 2018-06-01

## 2018-06-01 NOTE — TELEPHONE ENCOUNTER
Lm to make sure pt is also aware her iron was low and she should be taking 65 milligrams elemental iron with 500 vit c chewable 30 minutes before each dose

## 2018-06-04 ENCOUNTER — DOCUMENTATION ONLY (OUTPATIENT)
Dept: SURGERY | Age: 53
End: 2018-06-04

## 2018-06-04 NOTE — PROGRESS NOTES
RD called and left a message regarding vitamin D3 and an Iron supplement that patient needs to begin.

## 2018-06-05 ENCOUNTER — DOCUMENTATION ONLY (OUTPATIENT)
Dept: BARIATRICS/WEIGHT MGMT | Age: 53
End: 2018-06-05

## 2018-06-05 NOTE — PROGRESS NOTES
6/5/18:  Patient did not show for her nutrition class. She was left a voicemail to reschedule.     Bucky Marc MS RD

## 2018-06-06 ENCOUNTER — OFFICE VISIT (OUTPATIENT)
Dept: FAMILY MEDICINE CLINIC | Age: 53
End: 2018-06-06

## 2018-06-06 VITALS
WEIGHT: 293 LBS | HEIGHT: 63 IN | DIASTOLIC BLOOD PRESSURE: 78 MMHG | RESPIRATION RATE: 16 BRPM | SYSTOLIC BLOOD PRESSURE: 132 MMHG | TEMPERATURE: 98.5 F | OXYGEN SATURATION: 99 % | HEART RATE: 99 BPM | BODY MASS INDEX: 51.91 KG/M2

## 2018-06-06 DIAGNOSIS — R73.03 PREDIABETES: ICD-10-CM

## 2018-06-06 DIAGNOSIS — I10 ESSENTIAL HYPERTENSION: Primary | ICD-10-CM

## 2018-06-06 RX ORDER — METFORMIN HYDROCHLORIDE 500 MG/1
500 TABLET ORAL 2 TIMES DAILY WITH MEALS
Qty: 60 TAB | Refills: 6 | Status: SHIPPED | OUTPATIENT
Start: 2018-06-06 | End: 2019-01-07

## 2018-06-06 NOTE — PATIENT INSTRUCTIONS
Learning About High Blood Sugar  What is high blood sugar? Your body turns the food you eat into glucose (sugar), which it uses for energy. But if your body isn't able to use the sugar right away, it can build up in your blood and lead to high blood sugar. When the amount of sugar in your blood stays too high for too much of the time, you may have diabetes. Diabetes is a disease that can cause serious health problems. The good news is that lifestyle changes may help you get your blood sugar back to normal and avoid or delay diabetes. What causes high blood sugar? Sugar (glucose) can build up in your blood if you:  · Are overweight. · Have a family history of diabetes. · Take certain medicines, such as steroids. What are the symptoms? Having high blood sugar may not cause any symptoms at all. Or it may make you feel very thirsty or very hungry. You may also urinate more often than usual, have blurry vision, or lose weight without trying. How is high blood sugar treated? You can take steps to lower your blood sugar level if you understand what makes it get higher. Your doctor may want you to learn how to test your blood sugar level at home. Then you can see how illness, stress, or different kinds of food or medicine raise or lower your blood sugar level. Other tests may be needed to see if you have diabetes. How can you prevent high blood sugar? · Watch your weight. If you're overweight, losing just a small amount of weight may help. Reducing fat around your waist is most important. · Limit the amount of calories, sweets, and unhealthy fat you eat. Ask your doctor if a dietitian can help you. A registered dietitian can help you create meal plans that fit your lifestyle. · Get at least 30 minutes of exercise on most days of the week. Exercise helps control your blood sugar. It also helps you maintain a healthy weight. Walking is a good choice.  You also may want to do other activities, such as running, swimming, cycling, or playing tennis or team sports. · If your doctor prescribed medicines, take them exactly as prescribed. Call your doctor if you think you are having a problem with your medicine. You will get more details on the specific medicines your doctor prescribes. Follow-up care is a key part of your treatment and safety. Be sure to make and go to all appointments, and call your doctor if you are having problems. It's also a good idea to know your test results and keep a list of the medicines you take. Where can you learn more? Go to http://marcos-stacy.info/. Enter O108 in the search box to learn more about \"Learning About High Blood Sugar. \"  Current as of: March 13, 2017  Content Version: 11.4  © 4770-7386 Healthwise, Incorporated. Care instructions adapted under license by [employer] (which disclaims liability or warranty for this information). If you have questions about a medical condition or this instruction, always ask your healthcare professional. [address] any warranty or liability for your use of this information.

## 2018-06-06 NOTE — PROGRESS NOTES
HISTORY OF PRESENT ILLNESS  Teresa Lopez is a 48 y.o. female. HPI  Patient is here today for evaluation and treatment of: Prediabetes/Hypertension    Prediabetes:  Pt has been trying to change her diet. She has a nutritionist.  She is considering surgical weight loss but she really is unsure at this time whether she will proceed with this. Lab Results   Component Value Date/Time    Hemoglobin A1c 7.0 (H) 05/23/2018 11:50 AM        Wt Readings from Last 3 Encounters:   06/06/18 296 lb (134.3 kg)   05/23/18 299 lb (135.6 kg)   04/05/18 301 lb (136.5 kg)         Hypertension: BP is stable today; She is on meds as listed below; Tolerates med well. Stopped the use of  NSAIDs and thinks this has helped her BP. Pt has recently evaluated for sleep ; Has known sleep  Apnea;  nilsa be getting a new sleep study soon. Current Outpatient Prescriptions:     metFORMIN (GLUCOPHAGE) 500 mg tablet, Take 1 Tab by mouth two (2) times daily (with meals). , Disp: 60 Tab, Rfl: 6    losartan-hydroCHLOROthiazide (HYZAAR) 100-12.5 mg per tablet, TAKE ONE TABLET BY MOUTH ONCE DAILY, Disp: 90 Tab, Rfl: 3    omeprazole (PRILOSEC) 40 mg capsule, Take 1 Cap by mouth daily. , Disp: 90 Cap, Rfl: 1    simvastatin (ZOCOR) 10 mg tablet, TAKE ONE TABLET BY MOUTH NIGHTLY, Disp: 90 Tab, Rfl: 3    amLODIPine (NORVASC) 5 mg tablet, Take 1 Tab by mouth daily. , Disp: 90 Tab, Rfl: 3    ibuprofen (MOTRIN) 800 mg tablet, Take 1 Tab by mouth three (3) times daily (with meals). (Patient taking differently: Take 800 mg by mouth three (3) times daily (with meals). Take 1 tablet by mouth at bedtime.), Disp: 90 Tab, Rfl: 0      PMH,  Meds, Allergies, Family History, Social history reviewed    Review of Systems   Constitutional: Negative for chills and fever. Respiratory: Negative for shortness of breath and wheezing. Cardiovascular: Negative for chest pain and palpitations.    Neurological:        Numbness in leg;        Physical Exam Visit Vitals    /78 (BP 1 Location: Left arm, BP Patient Position: Sitting)    Pulse 99    Temp 98.5 °F (36.9 °C) (Oral)    Resp 16    Ht 5' 3\" (1.6 m)    Wt 296 lb (134.3 kg)    SpO2 99%    BMI 52.43 kg/m2   General appearance: alert, cooperative, no distress, appears stated age  Neck: supple, symmetrical, trachea midline, no adenopathy, thyroid: not enlarged, symmetric, no tenderness/mass/nodules, no carotid bruit and no JVD  Lungs: clear to auscultation bilaterally  Heart: regular rate and rhythm, S1, S2 normal, no murmur, click, rub or gallop  Extremities: extremities normal, atraumatic, no cyanosis or edema    Lab Results   Component Value Date/Time    Hemoglobin A1c 7.0 (H) 05/23/2018 11:50 AM     Lab Results   Component Value Date/Time    Cholesterol, total 150 05/04/2018 09:08 AM    HDL Cholesterol 43 05/04/2018 09:08 AM    LDL, calculated 78.4 05/04/2018 09:08 AM    VLDL, calculated 28.6 05/04/2018 09:08 AM    Triglyceride 143 05/04/2018 09:08 AM    CHOL/HDL Ratio 3.5 05/04/2018 09:08 AM       ASSESSMENT and PLAN    ICD-10-CM ICD-9-CM    1. Essential hypertension I10 401.9 LIPID PANEL      METABOLIC PANEL, BASIC   2. Prediabetes R73.03 790.29 LIPID PANEL      AST      ALT      HEMOGLOBIN A1C WITH EAG       As above, BP better; prediabetes not controlled   treatment plan as listed below  Orders Placed This Encounter    LIPID PANEL    AST    ALT    METABOLIC PANEL, BASIC    HEMOGLOBIN A1C WITH EAG    metFORMIN (GLUCOPHAGE) 500 mg tablet     Start metformin; side effects reviewed  Continue other current meds  BMI reviewed; Continue weight loss efforts  Follow-up Disposition:  Return in about 3 months (around 9/6/2018) for prediabetes/metformin. An After Visit Summary was printed and given to the patient. This has been fully explained to the patient, who indicates understanding. Labs for next visit placed.

## 2018-06-06 NOTE — PROGRESS NOTES
1. Have you been to the ER, urgent care clinic since your last visit? Hospitalized since your last visit? No    2. Have you seen or consulted any other health care providers outside of the [address] [place] [address] since your last visit? Include any pap smears or colon screening.  No

## 2018-06-06 NOTE — MR AVS SNAPSHOT
[address] [facility] 
 
 
 [address] [address] [address] [facility] [address] 
[phone] Patient: Bird Marin MRN:  [id number] Visit Information Date & Time Provider Department Dept. Phone Encounter #  
 6/6/2018  8:20 AM Anita Gordon, [address] [place] [address] [phone] [id number] Follow-up Instructions Return in about 3 months (around 9/6/2018) for prediabetes/metformin. Your Appointments 7/18/2018  2:45 PM  
Follow Up with Radha Hargrove MD  
[employer] [facility] Appt Note: [address] [facility] [place], Reggie [id number] [address] [place] [address] Upcoming Health Maintenance Date Due Hepatitis C Screening 1965 DTaP/Tdap/Td series (1 - Tdap) 2/25/1986 BREAST CANCER SCRN MAMMOGRAM 2/25/2015 FOBT Q 1 YEAR AGE 50-75 2/25/2015 PAP AKA CERVICAL CYTOLOGY 2/3/2017 Influenza Age 5 to Adult 8/1/2018 Allergies as of 6/6/2018  Review Complete On: 6/6/2018 By: Anita Gordon MD  
  
 Severity Noted Reaction Type Reactions Lisinopril  04/05/2018    Cough Current Immunizations  Reviewed on 11/29/2016 No immunizations on file. Not reviewed this visit You Were Diagnosed With   
  
 Codes Comments Essential hypertension    -  Primary ICD-10-CM: I10 
ICD-9-CM: 401.9 Prediabetes     ICD-10-CM: R73.03 
ICD-9-CM: 790.29 Vitals BP Pulse Temp Resp Height(growth percentile) Weight(growth percentile) 132/78 (BP 1 Location: Left arm, BP Patient Position: Sitting) 99 98.5 °F (36.9 °C) (Oral) 16 5' 3\" (1.6 m) 296 lb (134.3 kg) SpO2 BMI OB Status Smoking Status 99% 52.43 kg/m2 Premenopausal Never Smoker BMI and BSA Data Body Mass Index Body Surface Area  
 52.43 kg/m 2 2.44 m 2 Preferred Pharmacy Pharmacy Name Phone [address]ino [address] [place] [address], [address] [facility] [phone] Your Updated Medication List  
  
   
This list is accurate as of 6/6/18  9:20 AM.  Always use your most recent med list. amLODIPine 5 mg tablet Commonly known as:  Jesi [place] Take 1 Tab by mouth daily. ibuprofen 800 mg tablet Commonly known as:  MOTRIN Take 1 Tab by mouth three (3) times daily (with meals). losartan-hydroCHLOROthiazide 100-12.5 mg per tablet Commonly known as:  HYZAAR  
TAKE ONE TABLET BY MOUTH ONCE DAILY  
  
 metFORMIN 500 mg tablet Commonly known as:  GLUCOPHAGE Take 1 Tab by mouth two (2) times daily (with meals). omeprazole 40 mg capsule Commonly known as:  PRILOSEC Take 1 Cap by mouth daily. simvastatin 10 mg tablet Commonly known as:  ZOCOR  
TAKE ONE TABLET BY MOUTH NIGHTLY Prescriptions Sent to Pharmacy Refills  
 metFORMIN (GLUCOPHAGE) 500 mg tablet 6 Sig: Take 1 Tab by mouth two (2) times daily (with meals). Class: Normal  
 Pharmacy: [facility] [address] ANNE DODD [address] [place] [address], [address] [place] [address] #: [phone] Route: Oral  
  
Follow-up Instructions Return in about 3 months (around 9/6/2018) for prediabetes/metformin. Patient Instructions Learning About High Blood Sugar What is high blood sugar? Your body turns the food you eat into glucose (sugar), which it uses for energy. But if your body isn't able to use the sugar right away, it can build up in your blood and lead to high blood sugar. When the amount of sugar in your blood stays too high for too much of the time, you may have diabetes. Diabetes is a disease that can cause serious health problems. The good news is that lifestyle changes may help you get your blood sugar back to normal and avoid or delay diabetes. What causes high blood sugar? Sugar (glucose) can build up in your blood if you: · Are overweight. · Have a family history of diabetes. · Take certain medicines, such as steroids. What are the symptoms? Having high blood sugar may not cause any symptoms at all. Or it may make you feel very thirsty or very hungry. You may also urinate more often than usual, have blurry vision, or lose weight without trying. How is high blood sugar treated? You can take steps to lower your blood sugar level if you understand what makes it get higher. Your doctor may want you to learn how to test your blood sugar level at home. Then you can see how illness, stress, or different kinds of food or medicine raise or lower your blood sugar level. Other tests may be needed to see if you have diabetes. How can you prevent high blood sugar? · Watch your weight. If you're overweight, losing just a small amount of weight may help. Reducing fat around your waist is most important. · Limit the amount of calories, sweets, and unhealthy fat you eat. Ask your doctor if a dietitian can help you. A registered dietitian can help you create meal plans that fit your lifestyle. · Get at least 30 minutes of exercise on most days of the week. Exercise helps control your blood sugar. It also helps you maintain a healthy weight. Walking is a good choice. You also may want to do other activities, such as running, swimming, cycling, or playing tennis or team sports. · If your doctor prescribed medicines, take them exactly as prescribed. Call your doctor if you think you are having a problem with your medicine. You will get more details on the specific medicines your doctor prescribes. Follow-up care is a key part of your treatment and safety. Be sure to make and go to all appointments, and call your doctor if you are having problems. It's also a good idea to know your test results and keep a list of the medicines you take. Where can you learn more? Go to http://marcos-stacy.info/. Enter O108 in the search box to learn more about \"Learning About High Blood Sugar. \" Current as of: March 13, 2017 Content Version: 11.4 © 3155-0514 Healthwise, [employer]. Care instructions adapted under license by [employer] (which disclaims liability or warranty for this information). If you have questions about a medical condition or this instruction, always ask your healthcare professional. [address] any warranty or liability for your use of this information. Introducing [facility] [place] [facility]! Dear Felicitas Lr: 
Thank you for requesting a [employer] account. Our records indicate that you already have an active [employer] account. You can access your account anytime at [email or web address] Did you know that you can access your hospital and ER discharge instructions at any time in [employer]? You can also review all of your test results from your hospital stay or ER visit. Additional Information If you have questions, please visit the Frequently Asked Questions section of the [employer] website at [email or web address] Remember, [employer] is NOT to be used for urgent needs. For medical emergencies, dial 911. Now available from your iPhone and Android! Please provide this summary of care documentation to your next provider. Your primary care clinician is listed as [address]. If you have any questions after today's visit, please call [phone].

## 2018-06-11 ENCOUNTER — HOSPITAL ENCOUNTER (OUTPATIENT)
Dept: LAB | Age: 53
Discharge: HOME | End: 2018-06-11
Payer: COMMERCIAL

## 2018-06-11 LAB
ANION GAP SERPL CALC-SCNC: 7 MMOL/L (ref 3–18)
BUN SERPL-MCNC: 10 MG/DL (ref 7–18)
BUN/CREAT SERPL: 16 (ref 12–20)
CALCIUM SERPL-MCNC: 8.9 MG/DL (ref 8.5–10.1)
CHLORIDE SERPL-SCNC: 103 MMOL/L (ref 100–108)
CO2 SERPL-SCNC: 30 MMOL/L (ref 21–32)
CREAT SERPL-MCNC: 0.64 MG/DL (ref 0.6–1.3)
GLUCOSE SERPL-MCNC: 87 MG/DL (ref 74–99)
POTASSIUM SERPL-SCNC: 4 MMOL/L (ref 3.5–5.5)
SODIUM SERPL-SCNC: 140 MMOL/L (ref 136–145)

## 2018-06-11 PROCEDURE — 36415 COLL VENOUS BLD VENIPUNCTURE: CPT | Performed by: PSYCHIATRY & NEUROLOGY

## 2018-06-11 PROCEDURE — 80048 BASIC METABOLIC PNL TOTAL CA: CPT | Performed by: PSYCHIATRY & NEUROLOGY

## 2018-06-14 LAB
UREA BREATH TEST QL: NEGATIVE
UREA BREATH TEST QL: NORMAL

## 2018-06-21 DIAGNOSIS — M17.0 PRIMARY OSTEOARTHRITIS OF KNEES, BILATERAL: ICD-10-CM

## 2018-06-21 DIAGNOSIS — M25.551 RIGHT HIP PAIN: ICD-10-CM

## 2018-06-21 RX ORDER — IBUPROFEN 800 MG/1
800 TABLET ORAL
Qty: 90 TAB | Refills: 0 | Status: SHIPPED | OUTPATIENT
Start: 2018-06-21 | End: 2018-08-06 | Stop reason: SDUPTHER

## 2018-06-21 NOTE — TELEPHONE ENCOUNTER
Called and spoke to patient making her aware her rx request was approved and was sent to her pharmacy. Patient thanked writer for the call.

## 2018-06-22 RX ORDER — IBUPROFEN 800 MG/1
TABLET ORAL
Qty: 90 TAB | Refills: 0 | OUTPATIENT
Start: 2018-06-22

## 2018-07-26 ENCOUNTER — HOSPITAL ENCOUNTER (OUTPATIENT)
Dept: BARIATRICS/WEIGHT MGMT | Age: 53
Discharge: HOME | End: 2018-07-26

## 2018-07-26 ENCOUNTER — DOCUMENTATION ONLY (OUTPATIENT)
Dept: BARIATRICS/WEIGHT MGMT | Age: 53
End: 2018-07-26

## 2018-07-26 NOTE — PROGRESS NOTES
[address] [place] [facility] [address] [facility], [address]    Patient's Name: Mina Meyer   Age: 48 y.o. YOB: 1965   Sex: female    Date:   7/26/2018    Insurance:            Session: 1 of  3  Revision:   Surgeon:  Dr. Skip Boothe    Height: 5 f 3 Weight:    299      Lbs. BMI: 53.1   Pounds Lost since last month: 0               Pounds Gained since last month: 0    Starting Weight: 299   Previous Months Weight: 299  Overall Pounds Lost: 0 Overall Pounds Gained: 0      Do you smoke? None    Alcohol intake:  Number of drinks at a time:  1-2  Number of times a week: 2-3    Class Guidelines    Guidelines are reviewed with patient at the start of every class. 1. Patient understands that weight loss trial classes must be consecutive. Patient understands if they miss a class, it is their responsibility to contact me to reschedule class. I will reach out to patient after their first no show. 2.  Patient understands the expectations that weight maintenance/weight loss is expected during the classes. Failure to demonstrate changes may result in one extra month of weight loss trial, followed by going back to see the surgeon. Patient understands that they CAN NOT gain any weight during the weight loss trial.  Gaining weight will result in extra classes. 3. Patient is also instructed to be doing their labs, blood work, psych visit, support group and any other test that the surgeon has used while they are working on their weight loss trial.  4.  Patient was instructed to bring their blue binder to every class and appointment. Other Pertinent Information:     Changes Made Since Last Class: Less [employer]. Cut out [place]    Eating Habits and Behaviors    Today in class, we started talking about the key diet principles. We first focused on stopping liquid calories. Patient was also educated on carbohydrates.   Patient was instructed to start cutting out bread, rice, and pasta from the diet and start focusing more on meat and vegetables. I then gave a power point, which focused on Label Reading. In class, I gave patients a labels and we worked through a series of questions to help patients have a better understanding of label reading. Patient was instructed to review the serving size. Patient was encouraged to focus on protein and carbohydrates. We also did a few label reading activities to help the patient become more familiar with label reading. Patient's current diet habits include: 2 meals per day. She is doing fast food for most meals. She states she is eating dinner at home, but it is not healthy foods. She states she eats out 2-3 meals per day at fast food. She states she is not sure if surgery is for her because she is used to eating out and \"doesn't have time\" to read labels. Patient is going to make an appointment for the [facility] program.  She is snacking on chips and cookies. Physical Activity/Exercise    Comments: We talked about exercise. Patient was given reasons of why exercise is so important and how that can help with their long-term success. I have encouraged patient to get a support system to help with the activity. Currently for activity, patient is not doing anything. Behavior Modification       Comments:  Behavior modifications were reinforced. This included not eating in front of the TV, which could lead to bigger portions and eating when one is not hungry. We also talked about the importance of eating 3 meals per day. Patient was encouraged to food journal to keep their daily carbohydrates less than 30 grams per meal.      Goals that patient wants to work on includes:  1. Cut out sweet drink  2. Patient states she is not sure surgery is for her and is going to look into the MS[facility] program.  I have encouraged her to attend her next class to keep them consecutive.       Magdalena Jean-Baptiste Daly Renee. Leodan Lima 112  7/26/2018

## 2018-08-06 DIAGNOSIS — M25.551 RIGHT HIP PAIN: ICD-10-CM

## 2018-08-06 DIAGNOSIS — M17.0 PRIMARY OSTEOARTHRITIS OF KNEES, BILATERAL: ICD-10-CM

## 2018-08-06 RX ORDER — IBUPROFEN 800 MG/1
TABLET ORAL
Qty: 90 TAB | Refills: 0 | Status: SHIPPED | OUTPATIENT
Start: 2018-08-06 | End: 2018-09-18 | Stop reason: SDUPTHER

## 2018-08-21 ENCOUNTER — TELEPHONE (OUTPATIENT)
Dept: SURGERY | Age: 53
End: 2018-08-21

## 2018-08-21 NOTE — TELEPHONE ENCOUNTER
Called patient to r/s mid trial appt. Patient stated she does not want to have surgery right now. She would like to try supervised medical weight loss. Transfer call to Nitin Desai to set patient up for medical weight loss. Inform patient I will send a message to Wauneta Goldberg so she can cancel her appt for next week.

## 2018-09-06 ENCOUNTER — HOSPITAL ENCOUNTER (OUTPATIENT)
Dept: LAB | Age: 53
Discharge: HOME | End: 2018-09-06
Payer: COMMERCIAL

## 2018-09-06 ENCOUNTER — OFFICE VISIT (OUTPATIENT)
Dept: FAMILY MEDICINE CLINIC | Age: 53
End: 2018-09-06

## 2018-09-06 ENCOUNTER — CLINICAL SUPPORT (OUTPATIENT)
Dept: FAMILY MEDICINE CLINIC | Age: 53
End: 2018-09-06

## 2018-09-06 VITALS
SYSTOLIC BLOOD PRESSURE: 140 MMHG | HEART RATE: 76 BPM | OXYGEN SATURATION: 98 % | WEIGHT: 281.6 LBS | DIASTOLIC BLOOD PRESSURE: 82 MMHG | HEIGHT: 63 IN | TEMPERATURE: 98.1 F | BODY MASS INDEX: 49.89 KG/M2 | RESPIRATION RATE: 20 BRPM

## 2018-09-06 DIAGNOSIS — R73.03 PREDIABETES: Primary | ICD-10-CM

## 2018-09-06 DIAGNOSIS — E66.9 OBESITY, UNSPECIFIED CLASSIFICATION, UNSPECIFIED OBESITY TYPE, UNSPECIFIED WHETHER SERIOUS COMORBIDITY PRESENT: Primary | ICD-10-CM

## 2018-09-06 DIAGNOSIS — R73.03 PREDIABETES: ICD-10-CM

## 2018-09-06 DIAGNOSIS — D50.8 OTHER IRON DEFICIENCY ANEMIA: ICD-10-CM

## 2018-09-06 LAB
ALT SERPL-CCNC: 24 U/L (ref 13–56)
ANION GAP SERPL CALC-SCNC: 8 MMOL/L (ref 3–18)
AST SERPL-CCNC: 16 U/L (ref 15–37)
BASOPHILS # BLD: 0 K/UL (ref 0–0.1)
BASOPHILS NFR BLD: 0 % (ref 0–2)
BUN SERPL-MCNC: 16 MG/DL (ref 7–18)
BUN/CREAT SERPL: 19 (ref 12–20)
CALCIUM SERPL-MCNC: 9.2 MG/DL (ref 8.5–10.1)
CHLORIDE SERPL-SCNC: 107 MMOL/L (ref 100–108)
CHOLEST SERPL-MCNC: 153 MG/DL
CO2 SERPL-SCNC: 28 MMOL/L (ref 21–32)
CREAT SERPL-MCNC: 0.83 MG/DL (ref 0.6–1.3)
DIFFERENTIAL METHOD BLD: ABNORMAL
EOSINOPHIL # BLD: 0.2 K/UL (ref 0–0.4)
EOSINOPHIL NFR BLD: 3 % (ref 0–5)
ERYTHROCYTE [DISTWIDTH] IN BLOOD BY AUTOMATED COUNT: 17.6 % (ref 11.6–14.5)
EST. AVERAGE GLUCOSE BLD GHB EST-MCNC: 137 MG/DL
GLUCOSE SERPL-MCNC: 130 MG/DL (ref 74–99)
HBA1C MFR BLD: 6.4 % (ref 4.2–5.6)
HCT VFR BLD AUTO: 37 % (ref 35–45)
HDLC SERPL-MCNC: 43 MG/DL (ref 40–60)
HDLC SERPL: 3.6 {RATIO} (ref 0–5)
HGB BLD-MCNC: 11.5 G/DL (ref 12–16)
IRON SATN MFR SERPL: 9 %
IRON SERPL-MCNC: 28 UG/DL (ref 50–175)
LDLC SERPL CALC-MCNC: 81.6 MG/DL (ref 0–100)
LIPID PROFILE,FLP: NORMAL
LYMPHOCYTES # BLD: 2.2 K/UL (ref 0.9–3.6)
LYMPHOCYTES NFR BLD: 39 % (ref 21–52)
MCH RBC QN AUTO: 24.6 PG (ref 24–34)
MCHC RBC AUTO-ENTMCNC: 31.1 G/DL (ref 31–37)
MCV RBC AUTO: 79.2 FL (ref 74–97)
MONOCYTES # BLD: 0.4 K/UL (ref 0.05–1.2)
MONOCYTES NFR BLD: 6 % (ref 3–10)
NEUTS SEG # BLD: 3 K/UL (ref 1.8–8)
NEUTS SEG NFR BLD: 52 % (ref 40–73)
PLATELET # BLD AUTO: 326 K/UL (ref 135–420)
PMV BLD AUTO: 11.5 FL (ref 9.2–11.8)
POTASSIUM SERPL-SCNC: 3.9 MMOL/L (ref 3.5–5.5)
RBC # BLD AUTO: 4.67 M/UL (ref 4.2–5.3)
SODIUM SERPL-SCNC: 143 MMOL/L (ref 136–145)
TIBC SERPL-MCNC: 318 UG/DL (ref 250–450)
TRIGL SERPL-MCNC: 142 MG/DL (ref ?–150)
VLDLC SERPL CALC-MCNC: 28.4 MG/DL
WBC # BLD AUTO: 5.7 K/UL (ref 4.6–13.2)

## 2018-09-06 PROCEDURE — 80061 LIPID PANEL: CPT | Performed by: FAMILY MEDICINE

## 2018-09-06 PROCEDURE — 80048 BASIC METABOLIC PNL TOTAL CA: CPT | Performed by: FAMILY MEDICINE

## 2018-09-06 PROCEDURE — 83540 ASSAY OF IRON: CPT | Performed by: FAMILY MEDICINE

## 2018-09-06 PROCEDURE — 84460 ALANINE AMINO (ALT) (SGPT): CPT | Performed by: FAMILY MEDICINE

## 2018-09-06 PROCEDURE — 85025 COMPLETE CBC W/AUTO DIFF WBC: CPT | Performed by: FAMILY MEDICINE

## 2018-09-06 PROCEDURE — 84450 TRANSFERASE (AST) (SGOT): CPT | Performed by: FAMILY MEDICINE

## 2018-09-06 PROCEDURE — 36415 COLL VENOUS BLD VENIPUNCTURE: CPT | Performed by: FAMILY MEDICINE

## 2018-09-06 PROCEDURE — 83036 HEMOGLOBIN GLYCOSYLATED A1C: CPT | Performed by: FAMILY MEDICINE

## 2018-09-06 NOTE — PROGRESS NOTES
Patient attended a Medically Supervised Weight Loss New Patient Orientation today where we discussed:  - New Direction Very Low Calorie Diet details  - Medical Supervision  - Nutrition education  - Cost of Meal Replacements  - Policies and compliance required for program enrollment.      Patients initial consultation with physician is tentatively scheduled for:  Future Appointments  Date Time Provider Albin Chen   10/5/2018 9:00 AM DANAE Mcgowan   1/7/2019 9:00 AM Raj Quesada MD [address] [place] [address]

## 2018-09-06 NOTE — MR AVS SNAPSHOT
[facility] 
 
 
 [address] [address] 
[phone] Patient: Mary Mccann MRN:  [id number] Visit Information Date & Time Provider Department Dept. Phone Encounter #  
 9/6/2018  8:00 AM Jacky Real, [address] [place] [address] [phone] [id number] Follow-up Instructions Return in about 4 months (around 1/6/2019) for well exam.  
  
Your Appointments 9/6/2018  9:00 AM  
Nurse Visit with [address] Metabolic Program ([facility]) Appt Note: Family Ref-BMI-53 HR Metabolic Program ([facility]) [phone] Upcoming Health Maintenance Date Due Hepatitis C Screening 1965 DTaP/Tdap/Td series (1 - Tdap) 2/25/1986 FOBT Q 1 YEAR AGE 50-75 2/25/2015 PAP AKA CERVICAL CYTOLOGY 2/3/2017 Influenza Age 5 to Adult 8/1/2018 BREAST CANCER SCRN MAMMOGRAM 5/4/2020 Allergies as of 9/6/2018  Review Complete On: 6/6/2018 By: Jacky Real MD  
  
 Severity Noted Reaction Type Reactions Lisinopril  04/05/2018    Cough Current Immunizations  Reviewed on 11/29/2016 No immunizations on file. Not reviewed this visit You Were Diagnosed With   
  
 Codes Comments Prediabetes    -  Primary ICD-10-CM: R73.03 
ICD-9-CM: 790.29 Other iron deficiency anemia     ICD-10-CM: D50.8 ICD-9-CM: 280.8 Vitals BP Pulse Temp Resp Height(growth percentile) Weight(growth percentile) 140/82 76 98.1 °F (36.7 °C) (Oral) 20 5' 3\" (1.6 m) 281 lb 9.6 oz (127.7 kg) SpO2 BMI OB Status Smoking Status 98% 49.88 kg/m2 Premenopausal Never Smoker Vitals History BMI and BSA Data Body Mass Index Body Surface Area  
 49.88 kg/m 2 2.38 m 2 Preferred Pharmacy Pharmacy Name Phone Imelda Maxwell [address] [facility], [address] [place] [address] [phone] Your Updated Medication List  
  
 This list is accurate as of 9/6/18  8:33 AM.  Always use your most recent med list. amLODIPine 5 mg tablet Commonly known as:  Jairo Conine Take 1 Tab by mouth daily. ibuprofen 800 mg tablet Commonly known as:  MOTRIN  
TAKE 1 TABLET BY MOUTH THREE TIMES DAILY WITH MEALS  
  
 losartan-hydroCHLOROthiazide 100-12.5 mg per tablet Commonly known as:  HYZAAR  
TAKE ONE TABLET BY MOUTH ONCE DAILY  
  
 metFORMIN 500 mg tablet Commonly known as:  GLUCOPHAGE Take 1 Tab by mouth two (2) times daily (with meals). omeprazole 40 mg capsule Commonly known as:  PRILOSEC Take 1 Cap by mouth daily. simvastatin 10 mg tablet Commonly known as:  ZOCOR  
TAKE ONE TABLET BY MOUTH NIGHTLY Follow-up Instructions Return in about 4 months (around 1/6/2019) for well exam. To-Do List   
 09/06/2018 Lab:  ALT   
  
 09/06/2018 Lab:  AST   
  
 09/06/2018 Lab:  CBC WITH AUTOMATED DIFF   
  
 09/06/2018 Lab:  HEMOGLOBIN A1C WITH EAG   
  
 09/06/2018 Lab:  IRON PROFILE   
  
 09/06/2018 Lab:  LIPID PANEL   
  
 09/06/2018 Lab:  METABOLIC PANEL, BASIC Patient Instructions Learning About Diabetes Food Guidelines Your Care Instructions Meal planning is important to manage diabetes. It helps keep your blood sugar at a target level (which you set with your doctor). You don't have to eat special foods. You can eat what your family eats, including sweets once in a while. But you do have to pay attention to how often you eat and how much you eat of certain foods. You may want to work with a dietitian or a certified diabetes educator (CDE) to help you plan meals and snacks. A dietitian or CDE can also help you lose weight if that is one of your goals. What should you know about eating carbs?  
Managing the amount of carbohydrate (carbs) you eat is an important part of healthy meals when you have diabetes. Carbohydrate is found in many foods. · Learn which foods have carbs. And learn the amounts of carbs in different foods. ¨ Bread, cereal, pasta, and rice have about 15 grams of carbs in a serving. A serving is 1 slice of bread (1 ounce), ½ cup of cooked cereal, or 1/3 cup of cooked pasta or rice. ¨ Fruits have 15 grams of carbs in a serving. A serving is 1 small fresh fruit, such as an apple or orange; ½ of a banana; ½ cup of cooked or canned fruit; ½ cup of fruit juice; 1 cup of melon or raspberries; or 2 tablespoons of dried fruit. ¨ Milk and no-sugar-added yogurt have 15 grams of carbs in a serving. A serving is 1 cup of milk or 2/3 cup of no-sugar-added yogurt. ¨ Starchy vegetables have 15 grams of carbs in a serving. A serving is ½ cup of mashed potatoes or sweet potato; 1 cup winter squash; ½ of a small baked potato; ½ cup of cooked beans; or ½ cup cooked corn or green peas. · Learn how much carbs to eat each day and at each meal. A dietitian or CDE can teach you how to keep track of the amount of carbs you eat. This is called carbohydrate counting. · If you are not sure how to count carbohydrate grams, use the Plate Method to plan meals. It is a good, quick way to make sure that you have a balanced meal. It also helps you spread carbs throughout the day. ¨ Divide your plate by types of foods. Put non-starchy vegetables on half the plate, meat or other protein food on one-quarter of the plate, and a grain or starchy vegetable in the final quarter of the plate. To this you can add a small piece of fruit and 1 cup of milk or yogurt, depending on how many carbs you are supposed to eat at a meal. 
· Try to eat about the same amount of carbs at each meal. Do not \"save up\" your daily allowance of carbs to eat at one meal. 
· Proteins have very little or no carbs per serving.  Examples of proteins are beef, chicken, turkey, fish, eggs, tofu, cheese, cottage cheese, and peanut butter. A serving size of meat is 3 ounces, which is about the size of a deck of cards. Examples of meat substitute serving sizes (equal to 1 ounce of meat) are 1/4 cup of cottage cheese, 1 egg, 1 tablespoon of peanut butter, and ½ cup of tofu. How can you eat out and still eat healthy? · Learn to estimate the serving sizes of foods that have carbohydrate. If you measure food at home, it will be easier to estimate the amount in a serving of restaurant food. · If the meal you order has too much carbohydrate (such as potatoes, corn, or baked beans), ask to have a low-carbohydrate food instead. Ask for a salad or green vegetables. · If you use insulin, check your blood sugar before and after eating out to help you plan how much to eat in the future. · If you eat more carbohydrate at a meal than you had planned, take a walk or do other exercise. This will help lower your blood sugar. What else should you know? · Limit saturated fat, such as the fat from meat and dairy products. This is a healthy choice because people who have diabetes are at higher risk of heart disease. So choose lean cuts of meat and nonfat or low-fat dairy products. Use olive or canola oil instead of butter or shortening when cooking. · Don't skip meals. Your blood sugar may drop too low if you skip meals and take insulin or certain medicines for diabetes. · Check with your doctor before you drink alcohol. Alcohol can cause your blood sugar to drop too low. Alcohol can also cause a bad reaction if you take certain diabetes medicines. Follow-up care is a key part of your treatment and safety. Be sure to make and go to all appointments, and call your doctor if you are having problems. It's also a good idea to know your test results and keep a list of the medicines you take. Where can you learn more? Go to http://marcos-stacy.info/.  
Enter F335 in the search box to learn more about \"Learning About Diabetes Food Guidelines. \" Current as of: December 7, 2017 Content Version: 11.7 © 6238-0840 [employer]. Care instructions adapted under license by [employer] (which disclaims liability or warranty for this information). If you have questions about a medical condition or this instruction, always ask your healthcare professional. [address] any warranty or liability for your use of this information. Introducing [facility] & HEALTH SERVICES! Dear Martin Dejesus: 
Thank you for requesting a [employer] account. Our records indicate that you already have an active [employer] account. You can access your account anytime at [email or web address]. [employer] Spoken [employer] Did you know that you can access your hospital and ER discharge instructions at any time in [employer]? You can also review all of your test results from your hospital stay or ER visit. Additional Information If you have questions, please visit the Frequently Asked Questions section of the [employer] website at [email or web address] Remember, [employer] is NOT to be used for urgent needs. For medical emergencies, dial 911. Now available from your iPhone and Android! Please provide this summary of care documentation to your next provider. Your primary care clinician is listed as [address]. If you have any questions after today's visit, please call [phone].

## 2018-09-06 NOTE — PATIENT INSTRUCTIONS

## 2018-09-06 NOTE — PROGRESS NOTES
HISTORY OF PRESENT ILLNESS  Matthew Moncada is a 48 y.o. female. HPI   Pt was started on metformin on last visit. Has changed her diet. She has lost  20 lbs since her last visit. She has stopped [employer]; she has been considering surgical weight loss. She feels well; Will be started on cpap soon; She is feeling well; She voices no new complaints. Wt Readings from Last 3 Encounters:   09/06/18 281 lb 9.6 oz (127.7 kg)   06/06/18 296 lb (134.3 kg)   05/23/18 299 lb (135.6 kg)     PMH,  Meds, Allergies, Family History, Social history reviewed      Current Outpatient Prescriptions:     ibuprofen (MOTRIN) 800 mg tablet, TAKE 1 TABLET BY MOUTH THREE TIMES DAILY WITH MEALS, Disp: 90 Tab, Rfl: 0    metFORMIN (GLUCOPHAGE) 500 mg tablet, Take 1 Tab by mouth two (2) times daily (with meals). , Disp: 60 Tab, Rfl: 6    losartan-hydroCHLOROthiazide (HYZAAR) 100-12.5 mg per tablet, TAKE ONE TABLET BY MOUTH ONCE DAILY, Disp: 90 Tab, Rfl: 3    omeprazole (PRILOSEC) 40 mg capsule, Take 1 Cap by mouth daily. , Disp: 90 Cap, Rfl: 1    simvastatin (ZOCOR) 10 mg tablet, TAKE ONE TABLET BY MOUTH NIGHTLY, Disp: 90 Tab, Rfl: 3    amLODIPine (NORVASC) 5 mg tablet, Take 1 Tab by mouth daily. , Disp: 90 Tab, Rfl: 3      Review of Systems   Constitutional: Negative for chills and fever. Cardiovascular: Negative for chest pain and palpitations. Physical Exam   Constitutional: She appears well-developed and well-nourished. No distress. Cardiovascular: Normal rate and regular rhythm. Exam reveals no gallop and no friction rub. No murmur heard. Pulmonary/Chest: Breath sounds normal. No respiratory distress. She has no wheezes. She has no rales. Musculoskeletal: She exhibits no edema. Nursing note and vitals reviewed.      Visit Vitals    /82    Pulse 76    Temp 98.1 °F (36.7 °C) (Oral)    Resp 20    Ht 5' 3\" (1.6 m)    Wt 281 lb 9.6 oz (127.7 kg)    SpO2 98%    BMI 49.88 kg/m2     Lab Results Component Value Date/Time    Hemoglobin A1c 7.0 (H) 05/23/2018 11:50 AM     Lab Results   Component Value Date/Time    Cholesterol, total 150 05/04/2018 09:08 AM    HDL Cholesterol 43 05/04/2018 09:08 AM    LDL, calculated 78.4 05/04/2018 09:08 AM    VLDL, calculated 28.6 05/04/2018 09:08 AM    Triglyceride 143 05/04/2018 09:08 AM    CHOL/HDL Ratio 3.5 05/04/2018 09:08 AM     Lab Results   Component Value Date/Time    Sodium 140 06/11/2018 02:03 PM    Potassium 4.0 06/11/2018 02:03 PM    Chloride 103 06/11/2018 02:03 PM    CO2 30 06/11/2018 02:03 PM    Anion gap 7 06/11/2018 02:03 PM    Glucose 87 06/11/2018 02:03 PM    BUN 10 06/11/2018 02:03 PM    Creatinine 0.64 06/11/2018 02:03 PM    BUN/Creatinine ratio 16 06/11/2018 02:03 PM    GFR est AA >60 06/11/2018 02:03 PM    GFR est non-AA >60 06/11/2018 02:03 PM    Calcium 8.9 06/11/2018 02:03 PM         ASSESSMENT and PLAN    ICD-10-CM ICD-9-CM    1. Prediabetes R73.03 790.29 AST      ALT      LIPID PANEL      METABOLIC PANEL, BASIC      HEMOGLOBIN A1C WITH EAG   2. Other iron deficiency anemia D50.8 280.8 CBC WITH AUTOMATED DIFF      IRON PROFILE       As above, stable   treatment plan as listed below  Orders Placed This Encounter    CBC WITH AUTOMATED DIFF    IRON PROFILE    AST    ALT    LIPID PANEL    METABOLIC PANEL, BASIC    HEMOGLOBIN A1C WITH EAG   praised on weight loss efforts. Follow-up Disposition:  Return in about 4 months (around 1/6/2019) for well exam.  An After Visit Summary was printed and given to the patient. This has been fully explained to the patient, who indicates understanding.

## 2018-09-07 DIAGNOSIS — Z01.812 BLOOD TESTS PRIOR TO TREATMENT OR PROCEDURE: ICD-10-CM

## 2018-09-18 DIAGNOSIS — M17.0 PRIMARY OSTEOARTHRITIS OF KNEES, BILATERAL: ICD-10-CM

## 2018-09-18 DIAGNOSIS — M25.551 RIGHT HIP PAIN: ICD-10-CM

## 2018-09-18 RX ORDER — IBUPROFEN 800 MG/1
TABLET ORAL
Qty: 90 TAB | Refills: 0 | Status: SHIPPED | OUTPATIENT
Start: 2018-09-18 | End: 2018-11-25 | Stop reason: SDUPTHER

## 2018-10-05 ENCOUNTER — OFFICE VISIT (OUTPATIENT)
Dept: SURGERY | Age: 53
End: 2018-10-05

## 2018-10-05 VITALS
RESPIRATION RATE: 17 BRPM | BODY MASS INDEX: 49.29 KG/M2 | HEART RATE: 80 BPM | DIASTOLIC BLOOD PRESSURE: 78 MMHG | HEIGHT: 63 IN | WEIGHT: 278.2 LBS | SYSTOLIC BLOOD PRESSURE: 120 MMHG | TEMPERATURE: 98.3 F | OXYGEN SATURATION: 99 %

## 2018-10-05 DIAGNOSIS — E78.00 HYPERCHOLESTEROLEMIA: ICD-10-CM

## 2018-10-05 DIAGNOSIS — I15.9 SECONDARY HYPERTENSION: ICD-10-CM

## 2018-10-05 DIAGNOSIS — E55.9 VITAMIN D DEFICIENCY: ICD-10-CM

## 2018-10-05 DIAGNOSIS — E66.01 OBESITY, MORBID (HCC): Primary | ICD-10-CM

## 2018-10-05 DIAGNOSIS — R73.03 PREDIABETES: ICD-10-CM

## 2018-10-05 NOTE — PROGRESS NOTES
Initial Consultation for Weight Loss    Sagar Segal is a 48 y.o. female who comes into the office today for initial consultation for the surgical options for the treatment of morbid obesity. The patient initially identified obesity around age 27 after the birth of her children and that it came on slowly over time. Sagar Segal has tried a variety of unsupervised weight-loss attempts including Weight Watchers and Nutrisystem, but has yet to meet with lasting success. Maximum weight lost on a diet is about 30 lbs, but that the weight always seems to return. Today, the patient is Height: 5' 3\" (160 cm) , Weight: 135.6 kg (299 lb) for a BMI of Body mass index is 52.97 kg/(m^2). Aby Chavarria Maximum weight is 301lbs. It is due to their severe obesity, which is further complicated by  hypertension, GERD, stress urinary incontinence, obstructive sleep apnea - CPAP, weight related arthopathies and pre-DM. The patient is now seeking out medically supervised diet. She is not however interested in our VLCD or LCD using meal replacments and would like to work with grocery store foods only.        Ideal body weight: 52.4 kg (115 lb 8.3 oz)  Adjusted ideal body weight: 81.9 kg (180 lb 9.5 oz) (Based on [employer] Tables)      Wt Readings from Last 10 Encounters:   10/05/18 126.2 kg (278 lb 3.2 oz)   09/06/18 127.7 kg (281 lb 9.6 oz)   06/06/18 134.3 kg (296 lb)   05/23/18 135.6 kg (299 lb)   04/05/18 136.5 kg (301 lb)   03/20/18 134.2 kg (295 lb 12.8 oz)   07/27/17 132 kg (291 lb)   03/28/17 127 kg (280 lb)   12/06/16 125.4 kg (276 lb 7.3 oz)   12/01/16 126.6 kg (279 lb)       Weight Metrics 10/5/2018 10/5/2018 9/6/2018 6/6/2018 5/23/2018 4/5/2018 3/20/2018   Weight - 278 lb 3.2 oz 281 lb 9.6 oz 296 lb 299 lb 301 lb 295 lb 12.8 oz   Waist Measure Inches 49 - - - - - -   BMI - 49.28 kg/m2 49.88 kg/m2 52.43 kg/m2 52.97 kg/m2 53.32 kg/m2 52.4 kg/m2     History of binge eating: no but does frequently skip meals and eat large meals at the end of the day   History of purging: no    Major lifestyle changes: no   Other commitments: no   Any potential unsupportive: no, mother lives with her and cooks dinners with meat/veg/CHO     Does Zbigniew Pu have any food allergies or sensitivities: no      MWL questionnaire reviewed. If female:  No LMP recorded. Patient is not currently having periods (Reason: Premenopausal). Past Medical History:   Diagnosis Date    Allergic rhinitis     Anemia     Arthritis     BMI 45.0-49.9, adult (HCC)     49.5    Conjunctivitis, bacterial 04    left    Fibroids     GERD (gastroesophageal reflux disease)     Hiatal hernia     High cholesterol     Hypercholesterolemia -    TSH 0.6    Hypertension     Knee pain, left     Pelvic pain     Sleep apnea     Does not use CPAP    Uterine fibroid     Vitamin D deficiencies      (10)          (25)    Vitamin D deficiency        Past Surgical History:   Procedure Laterality Date    COLONOSCOPY N/A 2016    COLONOSCOPY performed by Danielle Gupta MD at [address] HX  SECTION      2 times    HX  SECTION  97 ; 01    HX COLONOSCOPY      HX DILATION AND CURETTAGE      3 times    HX ENDOSCOPY      HX MYOMECTOMY      HX MYOMECTOMY         Current Outpatient Prescriptions   Medication Sig Dispense Refill    ferrous sulfate ER (IRON) 160 mg (50 mg iron) TbER tablet Take 1 Tab by mouth daily.  ibuprofen (MOTRIN) 800 mg tablet TAKE 1 TABLET BY MOUTH THREE TIMES DAILY WITH MEALS 90 Tab 0    metFORMIN (GLUCOPHAGE) 500 mg tablet Take 1 Tab by mouth two (2) times daily (with meals). 60 Tab 6    losartan-hydroCHLOROthiazide (HYZAAR) 100-12.5 mg per tablet TAKE ONE TABLET BY MOUTH ONCE DAILY 90 Tab 3    omeprazole (PRILOSEC) 40 mg capsule Take 1 Cap by mouth daily.  90 Cap 1    simvastatin (ZOCOR) 10 mg tablet TAKE ONE TABLET BY MOUTH NIGHTLY 90 Tab 3    amLODIPine (NORVASC) 5 mg tablet Take 1 Tab by mouth daily.  90 Tab 3       Allergies   Allergen Reactions    Lisinopril Cough       Social History   Substance Use Topics    Smoking status: Never Smoker    Smokeless tobacco: Never Used    Alcohol use 0.0 oz/week     2 Glasses of wine, 0 Standard drinks or equivalent per week      Comment: on ocassion       Family History   Problem Relation Age of Onset    Hypertension Mother     Diabetes Maternal Uncle     Breast Cancer Maternal Grandmother     Heart Attack Maternal Grandfather     Hypertension Paternal Grandmother     Cancer Mother      uterine    Cancer Father      brain    Cancer Maternal Aunt      breast    Cancer Maternal Grandmother      breast    Heart Disease Paternal Grandfather     Cancer Cousin      breast       Family Status   Relation Status    Mother Alive    Maternal Uncle Alive    Maternal Grandmother  at age 52's    breast CA    Maternal Grandfather     acute MI    Paternal Grandmother Alive    Father  at age 62    CA. brain    Maternal Aunt     Paternal Grandfather     Cousin        Review of Systems:  Pos in BOLD  ROS  General: fevers, chills, night sweats, fatigue, weight loss, weight gain     GI: abdominal pain, nausea, vomiting, change in bowel habits, hematochezia, melena, GERD, constipation     Integumentary: dermatitis or abnormal moles.     HEENT:  visual changes, vertigo, epistaxis, dysphagia, hoarseness     Cardiac: chest pain, palpitations, hypertension, edema, varicosities     Resp:  cough, shortness of breath, wheezing, hemoptysis, snoring, reactive airway disease     : hematuria, dysuria, frequency, urgency, nocturia, stress urinary incontinence     MSK: weakness, joint pain, arthritis, plantar fascitis      Endocrine: pre-diabetes, thyroid disease, polyuria, polydipsia, polyphagia, poor wound healing, heat intolerance,cold intolerance     Lymph/Heme: anemia, bruising, history of blood transfusions     Neuro: dizziness, headache, fainting, seizures, stroke     Psychiatry: anxiety, depression, psychosis    Physical Exam    Visit Vitals    /78    Pulse 80    Temp 98.3 °F (36.8 °C) (Oral)    Resp 17    Ht 5' 3\" (1.6 m)    Wt 126.2 kg (278 lb 3.2 oz)    SpO2 99%    BMI 49.28 kg/m2           Physical Exam   Constitutional: She is oriented to person, place, and time and well-developed, well-nourished, and in no distress. HENT:   Head: Normocephalic. Cardiovascular: Normal rate. Pulmonary/Chest: Effort normal.   Abdominal: Soft. Musculoskeletal: Normal range of motion. Neurological: She is alert and oriented to person, place, and time. Skin: Skin is warm and dry. Psychiatric: Affect normal.   Nursing note and vitals reviewed. Assessment/Plan  Helen Lopez is a 48 y.o. female who is suffering from obesity with a BMI of 49.28 who would benefit from weight loss. She is no longer interested in surgery and does not feel the [place] MR ALCAZAR is right for her either. Diet regimen   # of meal replacements prescribed: 0  Ref to [employer] [facility]     Long term goal, 150- 180lbs       Medical monitoring schedule: Follow up in 3 mos for weight check and PRN       I have reviewed/discussed the above normal BMI with the patient. I have recommended the following interventions: dietary management education, guidance, and counseling, monitor weight and prescribed dietary intake . .      Ms. Su Linder has a reminder for a \"due or due soon\" health maintenance. I have asked that she contact her primary care provider for follow-up on this health maintenance.     Lei Gutierrez, BRANDI-BC

## 2018-10-05 NOTE — MR AVS SNAPSHOT
[address], [place] [address] [facility] 
[phone] Patient: Celeste Elias MRN: [id number] QCA:[id number] Visit Information Date & Time Provider Department Dept. Phone Encounter #  
 10/5/2018  9:00 AM Mitul Dunham NP [facility] [place] [phone] [id number] Your Appointments 1/4/2019 11:00 AM  
Follow Up with DANAE Cotto [facility] [place] ([address]) Appt Note: f/up with out labs [address] [facility] [place] [address] [place] [address], [address] [place] [address]  
  
    
 1/7/2019  9:00 AM  
Office Visit with Norma Vora MD  
[facility] [address] [facility]) Appt Note: 4 mo fu  
 [address] [place], [place] [address] [address]  
[phone]  
  
   
 [address],  [address] Upcoming Health Maintenance Date Due Hepatitis C Screening 1965 DTaP/Tdap/Td series (1 - Tdap) 2/25/1986 Shingrix Vaccine Age 50> (1 of 2) 2/25/2015 FOBT Q 1 YEAR AGE 50-75 2/25/2015 PAP AKA CERVICAL CYTOLOGY 2/3/2017 Influenza Age 5 to Adult 8/1/2018 BREAST CANCER SCRN MAMMOGRAM 5/4/2020 Allergies as of 10/5/2018  Review Complete On: 9/6/2018 By: Norma Vora MD  
  
 Severity Noted Reaction Type Reactions Lisinopril  04/05/2018    Cough Current Immunizations  Reviewed on 11/29/2016 No immunizations on file. Not reviewed this visit Vitals BP Pulse Temp Resp Height(growth percentile) Weight(growth percentile) 120/78 80 98.3 °F (36.8 °C) (Oral) 17 5' 3\" (1.6 m) 278 lb 3.2 oz (126.2 kg) SpO2 BMI OB Status Smoking Status 99% 49.28 kg/m2 Premenopausal Never Smoker Vitals History BMI and BSA Data Body Mass Index Body Surface Area  
 49.28 kg/m 2 2.37 m 2 Preferred Pharmacy Pharmacy Name Phone [facility], [address] [facility] [phone] Your Updated Medication List  
  
   
This list is accurate as of 10/5/18  9:58 AM.  Always use your most recent med list. amLODIPine 5 mg tablet Commonly known as:  Barrios Fritter Take 1 Tab by mouth daily. ibuprofen 800 mg tablet Commonly known as:  MOTRIN  
TAKE 1 TABLET BY MOUTH THREE TIMES DAILY WITH MEALS Iron 160 mg (50 mg iron) Tber tablet Generic drug:  ferrous sulfate ER Take 1 Tab by mouth daily. losartan-hydroCHLOROthiazide 100-12.5 mg per tablet Commonly known as:  HYZAAR  
TAKE ONE TABLET BY MOUTH ONCE DAILY  
  
 metFORMIN 500 mg tablet Commonly known as:  GLUCOPHAGE Take 1 Tab by mouth two (2) times daily (with meals). omeprazole 40 mg capsule Commonly known as:  PRILOSEC Take 1 Cap by mouth daily. simvastatin 10 mg tablet Commonly known as:  ZOCOR  
TAKE ONE TABLET BY MOUTH NIGHTLY Introducing [facility] & HEALTH SERVICES! Dear Radha Corley: 
Thank you for requesting a WhoSay account. Our records indicate that you already have an active WhoSay account. You can access your account anytime at [email or web address] Did you know that you can access your hospital and ER discharge instructions at any time in WhoSay? You can also review all of your test results from your hospital stay or ER visit. Additional Information If you have questions, please visit the Frequently Asked Questions section of the WhoSay website at [email or web address] Remember, WhoSay is NOT to be used for urgent needs. For medical emergencies, dial 911. Now available from your iPhone and Android! Please provide this summary of care documentation to your next provider. Your primary care clinician is listed as [address].  If you have any questions after today's visit, please call [phone].

## 2018-11-07 RX ORDER — OMEPRAZOLE 40 MG/1
CAPSULE, DELAYED RELEASE ORAL
Qty: 90 CAP | Refills: 1 | Status: SHIPPED | OUTPATIENT
Start: 2018-11-07 | End: 2019-01-07 | Stop reason: SDUPTHER

## 2018-11-08 RX ORDER — OMEPRAZOLE 40 MG/1
CAPSULE, DELAYED RELEASE ORAL
Qty: 90 CAP | Refills: 1 | Status: SHIPPED | OUTPATIENT
Start: 2018-11-08 | End: 2019-01-07 | Stop reason: SDUPTHER

## 2018-11-15 RX ORDER — OMEPRAZOLE 40 MG/1
CAPSULE, DELAYED RELEASE ORAL
Qty: 90 CAP | Refills: 1 | Status: SHIPPED | OUTPATIENT
Start: 2018-11-15 | End: 2019-02-23 | Stop reason: SDUPTHER

## 2018-11-25 DIAGNOSIS — M25.551 RIGHT HIP PAIN: ICD-10-CM

## 2018-11-25 DIAGNOSIS — M17.0 PRIMARY OSTEOARTHRITIS OF KNEES, BILATERAL: ICD-10-CM

## 2018-11-25 RX ORDER — IBUPROFEN 800 MG/1
TABLET ORAL
Qty: 90 TAB | Refills: 0 | Status: SHIPPED | OUTPATIENT
Start: 2018-11-25 | End: 2019-02-08 | Stop reason: SDUPTHER

## 2019-01-07 ENCOUNTER — HOSPITAL ENCOUNTER (OUTPATIENT)
Dept: LAB | Age: 54
Discharge: HOME | End: 2019-01-07
Payer: COMMERCIAL

## 2019-01-07 ENCOUNTER — OFFICE VISIT (OUTPATIENT)
Dept: FAMILY MEDICINE CLINIC | Age: 54
End: 2019-01-07

## 2019-01-07 VITALS
TEMPERATURE: 98 F | RESPIRATION RATE: 17 BRPM | HEIGHT: 63 IN | DIASTOLIC BLOOD PRESSURE: 76 MMHG | WEIGHT: 268.2 LBS | BODY MASS INDEX: 47.52 KG/M2 | OXYGEN SATURATION: 99 % | HEART RATE: 94 BPM | SYSTOLIC BLOOD PRESSURE: 138 MMHG

## 2019-01-07 DIAGNOSIS — E78.00 HYPERCHOLESTEROLEMIA: ICD-10-CM

## 2019-01-07 DIAGNOSIS — Z23 ENCOUNTER FOR IMMUNIZATION: ICD-10-CM

## 2019-01-07 DIAGNOSIS — Z11.59 NEED FOR HEPATITIS C SCREENING TEST: ICD-10-CM

## 2019-01-07 DIAGNOSIS — R73.03 PREDIABETES: ICD-10-CM

## 2019-01-07 DIAGNOSIS — Z00.00 WELL WOMAN EXAM (NO GYNECOLOGICAL EXAM): Primary | ICD-10-CM

## 2019-01-07 DIAGNOSIS — D50.9 IRON DEFICIENCY ANEMIA, UNSPECIFIED IRON DEFICIENCY ANEMIA TYPE: ICD-10-CM

## 2019-01-07 DIAGNOSIS — E66.01 OBESITY, MORBID (HCC): ICD-10-CM

## 2019-01-07 DIAGNOSIS — Z13.6 SCREENING FOR CARDIOVASCULAR CONDITION: ICD-10-CM

## 2019-01-07 LAB
ALT SERPL-CCNC: 22 U/L (ref 13–56)
AST SERPL-CCNC: 11 U/L (ref 15–37)
BASOPHILS # BLD: 0 K/UL (ref 0–0.1)
BASOPHILS NFR BLD: 0 % (ref 0–2)
CHOLEST SERPL-MCNC: 176 MG/DL
DIFFERENTIAL METHOD BLD: ABNORMAL
EOSINOPHIL # BLD: 0.2 K/UL (ref 0–0.4)
EOSINOPHIL NFR BLD: 3 % (ref 0–5)
ERYTHROCYTE [DISTWIDTH] IN BLOOD BY AUTOMATED COUNT: 17 % (ref 11.6–14.5)
EST. AVERAGE GLUCOSE BLD GHB EST-MCNC: 140 MG/DL
HBA1C MFR BLD: 6.5 % (ref 4.2–5.6)
HCT VFR BLD AUTO: 36.5 % (ref 35–45)
HDLC SERPL-MCNC: 48 MG/DL (ref 40–60)
HDLC SERPL: 3.7 {RATIO} (ref 0–5)
HGB BLD-MCNC: 11.3 G/DL (ref 12–16)
LDLC SERPL CALC-MCNC: 108.6 MG/DL (ref 0–100)
LIPID PROFILE,FLP: ABNORMAL
LYMPHOCYTES # BLD: 2.3 K/UL (ref 0.9–3.6)
LYMPHOCYTES NFR BLD: 36 % (ref 21–52)
MCH RBC QN AUTO: 24.5 PG (ref 24–34)
MCHC RBC AUTO-ENTMCNC: 31 G/DL (ref 31–37)
MCV RBC AUTO: 79.2 FL (ref 74–97)
MONOCYTES # BLD: 0.3 K/UL (ref 0.05–1.2)
MONOCYTES NFR BLD: 5 % (ref 3–10)
NEUTS SEG # BLD: 3.6 K/UL (ref 1.8–8)
NEUTS SEG NFR BLD: 56 % (ref 40–73)
PLATELET # BLD AUTO: 332 K/UL (ref 135–420)
PMV BLD AUTO: 11.7 FL (ref 9.2–11.8)
RBC # BLD AUTO: 4.61 M/UL (ref 4.2–5.3)
TRIGL SERPL-MCNC: 97 MG/DL (ref ?–150)
VLDLC SERPL CALC-MCNC: 19.4 MG/DL
WBC # BLD AUTO: 6.5 K/UL (ref 4.6–13.2)

## 2019-01-07 PROCEDURE — 86803 HEPATITIS C AB TEST: CPT

## 2019-01-07 PROCEDURE — 36415 COLL VENOUS BLD VENIPUNCTURE: CPT

## 2019-01-07 PROCEDURE — 84460 ALANINE AMINO (ALT) (SGPT): CPT

## 2019-01-07 PROCEDURE — 83036 HEMOGLOBIN GLYCOSYLATED A1C: CPT

## 2019-01-07 PROCEDURE — 80061 LIPID PANEL: CPT

## 2019-01-07 PROCEDURE — 85025 COMPLETE CBC W/AUTO DIFF WBC: CPT

## 2019-01-07 PROCEDURE — 84450 TRANSFERASE (AST) (SGOT): CPT

## 2019-01-07 NOTE — PATIENT INSTRUCTIONS
Body Mass Index: Care Instructions Your Care Instructions Body mass index (BMI) can help you see if your weight is raising your risk for health problems. It uses a formula to compare how much you weigh with how tall you are. · A BMI lower than 18.5 is considered underweight. · A BMI between 18.5 and 24.9 is considered healthy. · A BMI between 25 and 29.9 is considered overweight. A BMI of 30 or higher is considered obese. If your BMI is in the normal range, it means that you have a lower risk for weight-related health problems. If your BMI is in the overweight or obese range, you may be at increased risk for weight-related health problems, such as high blood pressure, heart disease, stroke, arthritis or joint pain, and diabetes. If your BMI is in the underweight range, you may be at increased risk for health problems such as fatigue, lower protection (immunity) against illness, muscle loss, bone loss, hair loss, and hormone problems. BMI is just one measure of your risk for weight-related health problems. You may be at higher risk for health problems if you are not active, you eat an unhealthy diet, or you drink too much alcohol or use tobacco products. Follow-up care is a key part of your treatment and safety. Be sure to make and go to all appointments, and call your doctor if you are having problems. It's also a good idea to know your test results and keep a list of the medicines you take. How can you care for yourself at home? · Practice healthy eating habits. This includes eating plenty of fruits, vegetables, whole grains, lean protein, and low-fat dairy. · If your doctor recommends it, get more exercise. Walking is a good choice. Bit by bit, increase the amount you walk every day. Try for at least 30 minutes on most days of the week. · Do not smoke. Smoking can increase your risk for health problems.  If you need help quitting, talk to your doctor about stop-smoking programs and medicines. These can increase your chances of quitting for good. · Limit alcohol to 2 drinks a day for men and 1 drink a day for women. Too much alcohol can cause health problems. If you have a BMI higher than 25 · Your doctor may do other tests to check your risk for weight-related health problems. This may include measuring the distance around your waist. A waist measurement of more than 40 inches in men or 35 inches in women can increase the risk of weight-related health problems. · Talk with your doctor about steps you can take to stay healthy or improve your health. You may need to make lifestyle changes to lose weight and stay healthy, such as changing your diet and getting regular exercise. If you have a BMI lower than 18.5 · Your doctor may do other tests to check your risk for health problems. · Talk with your doctor about steps you can take to stay healthy or improve your health. You may need to make lifestyle changes to gain or maintain weight and stay healthy, such as getting more healthy foods in your diet and doing exercises to build muscle. Where can you learn more? Go to http://marcos-stacy.info/. Enter S176 in the search box to learn more about \"Body Mass Index: Care Instructions. \" Current as of: June 26, 2018 Content Version: 11.8 © 4446-7654 Healthwise, [employer]. Care instructions adapted under license by [employer] (which disclaims liability or warranty for this information). If you have questions about a medical condition or this instruction, always ask your healthcare professional. [address] any warranty or liability for your use of this information.

## 2019-01-07 NOTE — ACP (ADVANCE CARE PLANNING)
Advance Care Planning (ACP) Provider Conversation Snapshot    Date of ACP Conversation: 01/07/19  Persons included in Conversation:  patient  Length of ACP Conversation in minutes:  <16 minutes (Non-Billable)    Authorized Decision Maker (if patient is incapable of making informed decisions):    This person is:   NA          For Patients with Decision Making Capacity:   TBD    Conversation Outcomes / Follow-Up Plan:   Recommended completion of Advance Directive form after review of ACP materials and conversation with prospective healthcare agent   Recommended communicating the plan and making copies for the healthcare agent, personal physician, and others as appropriate (e.g., health system)

## 2019-01-08 LAB
HCV AB SER IA-ACNC: 0.04 INDEX
HCV AB SERPL QL IA: NEGATIVE
HCV COMMENT,HCGAC: NORMAL

## 2019-01-11 ENCOUNTER — TELEPHONE (OUTPATIENT)
Dept: FAMILY MEDICINE CLINIC | Age: 54
End: 2019-01-11

## 2019-01-11 NOTE — TELEPHONE ENCOUNTER
[facility] called in stating that the extended release iron script that was sent over only comes in 45mg and not 50mg, and requested a script change.  Gave verbal to change script from 50 to 45mg per [facility]

## 2019-02-08 DIAGNOSIS — M25.551 RIGHT HIP PAIN: ICD-10-CM

## 2019-02-08 DIAGNOSIS — M17.0 PRIMARY OSTEOARTHRITIS OF KNEES, BILATERAL: ICD-10-CM

## 2019-02-08 RX ORDER — IBUPROFEN 800 MG/1
TABLET ORAL
Qty: 90 TAB | Refills: 0 | Status: SHIPPED | OUTPATIENT
Start: 2019-02-08 | End: 2019-05-05 | Stop reason: SDUPTHER

## 2019-02-19 ENCOUNTER — APPOINTMENT (OUTPATIENT)
Dept: NUTRITION | Age: 54
End: 2019-02-19

## 2019-02-23 RX ORDER — OMEPRAZOLE 40 MG/1
CAPSULE, DELAYED RELEASE ORAL
Qty: 90 CAP | Refills: 0 | Status: SHIPPED | OUTPATIENT
Start: 2019-02-23 | End: 2019-05-06 | Stop reason: SDUPTHER

## 2019-04-22 RX ORDER — AMLODIPINE BESYLATE 5 MG/1
TABLET ORAL
Qty: 90 TAB | Refills: 3 | Status: SHIPPED | OUTPATIENT
Start: 2019-04-22 | End: 2020-05-02

## 2019-04-27 DIAGNOSIS — I10 ESSENTIAL HYPERTENSION: ICD-10-CM

## 2019-04-29 DIAGNOSIS — I10 ESSENTIAL HYPERTENSION: ICD-10-CM

## 2019-04-30 RX ORDER — LOSARTAN POTASSIUM AND HYDROCHLOROTHIAZIDE 12.5; 1 MG/1; MG/1
TABLET ORAL
Qty: 90 TAB | Refills: 3 | Status: SHIPPED | OUTPATIENT
Start: 2019-04-30 | End: 2020-05-02

## 2019-04-30 RX ORDER — SIMVASTATIN 10 MG/1
TABLET, FILM COATED ORAL
Qty: 90 TAB | Refills: 3 | Status: SHIPPED | OUTPATIENT
Start: 2019-04-30 | End: 2019-05-06

## 2019-04-30 RX ORDER — LOSARTAN POTASSIUM AND HYDROCHLOROTHIAZIDE 12.5; 1 MG/1; MG/1
TABLET ORAL
Qty: 90 TAB | Refills: 3 | Status: SHIPPED | OUTPATIENT
Start: 2019-04-30 | End: 2019-05-06

## 2019-04-30 RX ORDER — SIMVASTATIN 10 MG/1
TABLET, FILM COATED ORAL
Qty: 90 TAB | Refills: 3 | Status: SHIPPED | OUTPATIENT
Start: 2019-04-30 | End: 2020-07-24

## 2019-05-05 DIAGNOSIS — M25.551 RIGHT HIP PAIN: ICD-10-CM

## 2019-05-05 DIAGNOSIS — M17.0 PRIMARY OSTEOARTHRITIS OF KNEES, BILATERAL: ICD-10-CM

## 2019-05-06 ENCOUNTER — HOSPITAL ENCOUNTER (OUTPATIENT)
Dept: LAB | Age: 54
Discharge: HOME | End: 2019-05-06
Payer: COMMERCIAL

## 2019-05-06 ENCOUNTER — TELEPHONE (OUTPATIENT)
Dept: FAMILY MEDICINE CLINIC | Age: 54
End: 2019-05-06

## 2019-05-06 ENCOUNTER — OFFICE VISIT (OUTPATIENT)
Dept: FAMILY MEDICINE CLINIC | Age: 54
End: 2019-05-06

## 2019-05-06 VITALS
HEART RATE: 74 BPM | RESPIRATION RATE: 20 BRPM | TEMPERATURE: 98.4 F | DIASTOLIC BLOOD PRESSURE: 83 MMHG | WEIGHT: 270 LBS | HEIGHT: 63 IN | OXYGEN SATURATION: 98 % | SYSTOLIC BLOOD PRESSURE: 139 MMHG | BODY MASS INDEX: 47.84 KG/M2

## 2019-05-06 DIAGNOSIS — R73.03 PREDIABETES: ICD-10-CM

## 2019-05-06 DIAGNOSIS — I10 ESSENTIAL HYPERTENSION: ICD-10-CM

## 2019-05-06 DIAGNOSIS — E78.00 HYPERCHOLESTEROLEMIA: ICD-10-CM

## 2019-05-06 DIAGNOSIS — R73.03 PREDIABETES: Primary | ICD-10-CM

## 2019-05-06 LAB
ALT SERPL-CCNC: 20 U/L (ref 13–56)
ANION GAP SERPL CALC-SCNC: 6 MMOL/L (ref 3–18)
AST SERPL-CCNC: 11 U/L (ref 15–37)
BASOPHILS # BLD: 0 K/UL (ref 0–0.1)
BASOPHILS NFR BLD: 0 % (ref 0–2)
BUN SERPL-MCNC: 19 MG/DL (ref 7–18)
BUN/CREAT SERPL: 27 (ref 12–20)
CALCIUM SERPL-MCNC: 9 MG/DL (ref 8.5–10.1)
CHLORIDE SERPL-SCNC: 106 MMOL/L (ref 100–108)
CHOLEST SERPL-MCNC: 163 MG/DL
CO2 SERPL-SCNC: 29 MMOL/L (ref 21–32)
CREAT SERPL-MCNC: 0.7 MG/DL (ref 0.6–1.3)
DIFFERENTIAL METHOD BLD: ABNORMAL
EOSINOPHIL # BLD: 0.2 K/UL (ref 0–0.4)
EOSINOPHIL NFR BLD: 3 % (ref 0–5)
ERYTHROCYTE [DISTWIDTH] IN BLOOD BY AUTOMATED COUNT: 16.2 % (ref 11.6–14.5)
GLUCOSE SERPL-MCNC: 106 MG/DL (ref 74–99)
HCT VFR BLD AUTO: 36.3 % (ref 35–45)
HDLC SERPL-MCNC: 53 MG/DL (ref 40–60)
HDLC SERPL: 3.1 {RATIO} (ref 0–5)
HGB BLD-MCNC: 11.4 G/DL (ref 12–16)
LDLC SERPL CALC-MCNC: 88.6 MG/DL (ref 0–100)
LIPID PROFILE,FLP: NORMAL
LYMPHOCYTES # BLD: 2 K/UL (ref 0.9–3.6)
LYMPHOCYTES NFR BLD: 35 % (ref 21–52)
MCH RBC QN AUTO: 25.2 PG (ref 24–34)
MCHC RBC AUTO-ENTMCNC: 31.4 G/DL (ref 31–37)
MCV RBC AUTO: 80.1 FL (ref 74–97)
MONOCYTES # BLD: 0.3 K/UL (ref 0.05–1.2)
MONOCYTES NFR BLD: 5 % (ref 3–10)
NEUTS SEG # BLD: 3.3 K/UL (ref 1.8–8)
NEUTS SEG NFR BLD: 57 % (ref 40–73)
PLATELET # BLD AUTO: 307 K/UL (ref 135–420)
PMV BLD AUTO: 11.3 FL (ref 9.2–11.8)
POTASSIUM SERPL-SCNC: 4.1 MMOL/L (ref 3.5–5.5)
RBC # BLD AUTO: 4.53 M/UL (ref 4.2–5.3)
SODIUM SERPL-SCNC: 141 MMOL/L (ref 136–145)
TRIGL SERPL-MCNC: 107 MG/DL (ref ?–150)
VLDLC SERPL CALC-MCNC: 21.4 MG/DL
WBC # BLD AUTO: 5.7 K/UL (ref 4.6–13.2)

## 2019-05-06 PROCEDURE — 85025 COMPLETE CBC W/AUTO DIFF WBC: CPT

## 2019-05-06 PROCEDURE — 80048 BASIC METABOLIC PNL TOTAL CA: CPT

## 2019-05-06 PROCEDURE — 84460 ALANINE AMINO (ALT) (SGPT): CPT

## 2019-05-06 PROCEDURE — 83036 HEMOGLOBIN GLYCOSYLATED A1C: CPT

## 2019-05-06 PROCEDURE — 84450 TRANSFERASE (AST) (SGOT): CPT

## 2019-05-06 PROCEDURE — 36415 COLL VENOUS BLD VENIPUNCTURE: CPT

## 2019-05-06 PROCEDURE — 80061 LIPID PANEL: CPT

## 2019-05-06 RX ORDER — IBUPROFEN 800 MG/1
TABLET ORAL
Qty: 90 TAB | Refills: 0 | Status: SHIPPED | OUTPATIENT
Start: 2019-05-06 | End: 2019-11-08 | Stop reason: SDUPTHER

## 2019-05-06 RX ORDER — OMEPRAZOLE 40 MG/1
CAPSULE, DELAYED RELEASE ORAL
Qty: 90 CAP | Refills: 0 | Status: SHIPPED | OUTPATIENT
Start: 2019-05-06 | End: 2019-10-13 | Stop reason: SDUPTHER

## 2019-05-06 NOTE — PROGRESS NOTES
Patient here for Hypertension and cholesterol problems follow up. 1. Have you been to the ER, urgent care clinic since your last visit? Hospitalized since your last visit? No    2. Have you seen or consulted any other health care providers outside of the [address] [place] [address] since your last visit? Include any pap smears or colon screening.  No

## 2019-05-06 NOTE — PROGRESS NOTES
HISTORY OF PRESENT ILLNESS  Luiz Singletary is a 47 y.o. female. HPI  Patient is here today for evaluation and treatment of: Hypertension/Cholesterol problem    Hypertension: On meds for BP as noted below; BP is stable; no new complaints; Has had some recent dietary indiscretions; She is trying to lose weight. Cholesterol:  Continues on zocor; She tolerates med well; attempts a lower cholesterol diet    Pt is prediabetic; She is planning to do exercise with [employer]. soon    . Wt Readings from Last 3 Encounters:   05/06/19 270 lb (122.5 kg)   01/07/19 268 lb 3.2 oz (121.7 kg)   10/05/18 278 lb 3.2 oz (126.2 kg)           Current Outpatient Medications:     ibuprofen (MOTRIN) 800 mg tablet, TAKE 1 TABLET BY MOUTH THREE TIMES DAILY WITH MEALS, Disp: 90 Tab, Rfl: 0    omeprazole (PRILOSEC) 40 mg capsule, TAKE 1 CAPSULE BY MOUTH ONCE DAILY as needed, Disp: 90 Cap, Rfl: 0    ferrous sulfate ER (IRON) 160 mg (50 mg iron) TbER tablet, Take 1 Tab by mouth daily. , Disp: 90 Tab, Rfl: 3    simvastatin (ZOCOR) 10 mg tablet, TAKE 1 TABLET BY MOUTH ONCE NIGHTLY, Disp: 90 Tab, Rfl: 3    losartan-hydroCHLOROthiazide (HYZAAR) 100-12.5 mg per tablet, TAKE 1 TABLET BY MOUTH ONCE DAILY, Disp: 90 Tab, Rfl: 3    amLODIPine (NORVASC) 5 mg tablet, TAKE 1 TABLET BY MOUTH ONCE DAILY, Disp: 90 Tab, Rfl: 3     PMH,  Meds, Allergies, Family History, Social history reviewed    Review of Systems   Constitutional: Negative for chills and fever. Respiratory: Negative for shortness of breath and wheezing. Cardiovascular: Negative for chest pain and palpitations. Physical Exam   Constitutional: She appears well-developed and well-nourished. No distress. Cardiovascular: Normal rate and regular rhythm. Exam reveals no gallop and no friction rub. No murmur heard. Pulmonary/Chest: Breath sounds normal. No respiratory distress. She has no wheezes. She has no rales. Musculoskeletal: She exhibits no edema.    Nursing note and vitals reviewed. Visit Vitals  /83 (BP 1 Location: Left arm, BP Patient Position: Sitting)   Pulse 74   Temp 98.4 °F (36.9 °C) (Oral)   Resp 20   Ht 5' 3\" (1.6 m)   Wt 270 lb (122.5 kg)   SpO2 98%   BMI 47.83 kg/m²     General appearance: alert, cooperative, no distress, appears stated age  Neck: supple, symmetrical, trachea midline, no adenopathy, thyroid: not enlarged, symmetric, no tenderness/mass/nodules, no carotid bruit and no JVD  Lungs: clear to auscultation bilaterally  Heart: regular rate and rhythm, S1, S2 normal, no murmur, click, rub or gallop  Extremities: extremities normal, atraumatic, no cyanosis or edema    Lab Results   Component Value Date/Time    Cholesterol, total 176 01/07/2019 10:29 AM    HDL Cholesterol 48 01/07/2019 10:29 AM    LDL, calculated 108.6 (H) 01/07/2019 10:29 AM    VLDL, calculated 19.4 01/07/2019 10:29 AM    Triglyceride 97 01/07/2019 10:29 AM    CHOL/HDL Ratio 3.7 01/07/2019 10:29 AM     Lab Results   Component Value Date/Time    Sodium 143 09/06/2018 08:38 AM    Potassium 3.9 09/06/2018 08:38 AM    Chloride 107 09/06/2018 08:38 AM    CO2 28 09/06/2018 08:38 AM    Anion gap 8 09/06/2018 08:38 AM    Glucose 130 (H) 09/06/2018 08:38 AM    BUN 16 09/06/2018 08:38 AM    Creatinine 0.83 09/06/2018 08:38 AM    BUN/Creatinine ratio 19 09/06/2018 08:38 AM    GFR est AA >60 09/06/2018 08:38 AM    GFR est non-AA >60 09/06/2018 08:38 AM    Calcium 9.2 09/06/2018 08:38 AM         Lab Results   Component Value Date/Time    Hemoglobin A1c 6.5 (H) 01/07/2019 10:29 AM       ASSESSMENT and PLAN    ICD-10-CM ICD-9-CM    1. Prediabetes R73.03 790.29 HEMOGLOBIN A1C WITH EAG   2. Essential hypertension J51 724.9 METABOLIC PANEL, BASIC      CBC WITH AUTOMATED DIFF      HEMOGLOBIN A1C WITH EAG   3.  Hypercholesterolemia E78.00 272.0 LIPID PANEL      AST      ALT      CBC WITH AUTOMATED DIFF       As above, moderate control   treatment plan as listed below  Orders Placed This Encounter    METABOLIC PANEL, BASIC    LIPID PANEL    AST    ALT    CBC WITH AUTOMATED DIFF    HEMOGLOBIN A1C WITH EAG    omeprazole (PRILOSEC) 40 mg capsule    ferrous sulfate ER (IRON) 160 mg (50 mg iron) TbER tablet     Follow-up and Dispositions    · Return in about 4 months (around 9/6/2019). An After Visit Summary was printed and given to the patient. This has been fully explained to the patient, who indicates understanding.   Refilled meds needed  Advised use of prilosec prn  Prudent use of NSAIDs also advised

## 2019-05-06 NOTE — TELEPHONE ENCOUNTER
Pharmacy called requesting to see if the patient is able to receive Iron 65 mg instead of Iron 160 mg because they don't carry that strength. Please advise.

## 2019-05-06 NOTE — PATIENT INSTRUCTIONS
DASH Diet: Care Instructions  Your Care Instructions    The DASH diet is an eating plan that can help lower your blood pressure. DASH stands for Dietary Approaches to Stop Hypertension. Hypertension is high blood pressure. The DASH diet focuses on eating foods that are high in calcium, potassium, and magnesium. These nutrients can lower blood pressure. The foods that are highest in these nutrients are fruits, vegetables, low-fat dairy products, nuts, seeds, and legumes. But taking calcium, potassium, and magnesium supplements instead of eating foods that are high in those nutrients does not have the same effect. The DASH diet also includes whole grains, fish, and poultry. The DASH diet is one of several lifestyle changes your doctor may recommend to lower your high blood pressure. Your doctor may also want you to decrease the amount of sodium in your diet. Lowering sodium while following the DASH diet can lower blood pressure even further than just the DASH diet alone. Follow-up care is a key part of your treatment and safety. Be sure to make and go to all appointments, and call your doctor if you are having problems. It's also a good idea to know your test results and keep a list of the medicines you take. How can you care for yourself at home? Following the DASH diet  · Eat 4 to 5 servings of fruit each day. A serving is 1 medium-sized piece of fruit, ½ cup chopped or canned fruit, 1/4 cup dried fruit, or 4 ounces (½ cup) of fruit juice. Choose fruit more often than fruit juice. · Eat 4 to 5 servings of vegetables each day. A serving is 1 cup of lettuce or raw leafy vegetables, ½ cup of chopped or cooked vegetables, or 4 ounces (½ cup) of vegetable juice. Choose vegetables more often than vegetable juice. · Get 2 to 3 servings of low-fat and fat-free dairy each day. A serving is 8 ounces of milk, 1 cup of yogurt, or 1 ½ ounces of cheese. · Eat 6 to 8 servings of grains each day.  A serving is 1 slice of bread, 1 ounce of dry cereal, or ½ cup of cooked rice, pasta, or cooked cereal. Try to choose whole-grain products as much as possible. · Limit lean meat, poultry, and fish to 2 servings each day. A serving is 3 ounces, about the size of a deck of cards. · Eat 4 to 5 servings of nuts, seeds, and legumes (cooked dried beans, lentils, and split peas) each week. A serving is 1/3 cup of nuts, 2 tablespoons of seeds, or ½ cup of cooked beans or peas. · Limit fats and oils to 2 to 3 servings each day. A serving is 1 teaspoon of vegetable oil or 2 tablespoons of salad dressing. · Limit sweets and added sugars to 5 servings or less a week. A serving is 1 tablespoon jelly or jam, ½ cup sorbet, or 1 cup of lemonade. · Eat less than 2,300 milligrams (mg) of sodium a day. If you limit your sodium to 1,500 mg a day, you can lower your blood pressure even more. Tips for success  · Start small. Do not try to make dramatic changes to your diet all at once. You might feel that you are missing out on your favorite foods and then be more likely to not follow the plan. Make small changes, and stick with them. Once those changes become habit, add a few more changes. · Try some of the following:  ? Make it a goal to eat a fruit or vegetable at every meal and at snacks. This will make it easy to get the recommended amount of fruits and vegetables each day. ? Try yogurt topped with fruit and nuts for a snack or healthy dessert. ? Add lettuce, tomato, cucumber, and onion to sandwiches. ? Combine a ready-made pizza crust with low-fat mozzarella cheese and lots of vegetable toppings. Try using tomatoes, squash, spinach, broccoli, carrots, cauliflower, and onions. ? Have a variety of cut-up vegetables with a low-fat dip as an appetizer instead of chips and dip. ? Sprinkle sunflower seeds or chopped almonds over salads. Or try adding chopped walnuts or almonds to cooked vegetables.   ? Try some vegetarian meals using beans and peas. Add garbanzo or kidney beans to salads. Make burritos and tacos with mashed raygoza beans or black beans. Where can you learn more? Go to http://marcos-stacy.info/. Enter U469 in the search box to learn more about \"DASH Diet: Care Instructions. \"  Current as of: July 22, 2018  Content Version: 11.9  © 7807-7198 [place], [employer]. Care instructions adapted under license by [employer] (which disclaims liability or warranty for this information). If you have questions about a medical condition or this instruction, always ask your healthcare professional. [address] any warranty or liability for your use of this information.

## 2019-05-07 LAB
EST. AVERAGE GLUCOSE BLD GHB EST-MCNC: 143 MG/DL
HBA1C MFR BLD: 6.6 % (ref 4.2–5.6)

## 2019-05-07 RX ORDER — LANOLIN ALCOHOL/MO/W.PET/CERES
325 CREAM (GRAM) TOPICAL
Qty: 90 TAB | Refills: 3 | Status: SHIPPED | OUTPATIENT
Start: 2019-05-07 | End: 2020-05-26 | Stop reason: SDUPTHER

## 2019-05-07 NOTE — TELEPHONE ENCOUNTER
Spoke with Emir Lujan at [facility] to inform that an alternative prescription for ferrous sulfate 65 mg was sent to replace the prescription ferrous sulfate 50 mg due to availability. Emir Lujan verbalized understanding and stated prescription for ferrous sulfate 65 mg was ready for patient .

## 2019-05-17 ENCOUNTER — HOSPITAL ENCOUNTER (OUTPATIENT)
Dept: NUTRITION | Age: 54
Discharge: HOME | End: 2019-05-17
Payer: COMMERCIAL

## 2019-05-17 PROCEDURE — 97802 MEDICAL NUTRITION INDIV IN: CPT

## 2019-09-09 ENCOUNTER — HOSPITAL ENCOUNTER (OUTPATIENT)
Dept: LAB | Age: 54
Discharge: HOME | End: 2019-09-09
Payer: COMMERCIAL

## 2019-09-09 ENCOUNTER — OFFICE VISIT (OUTPATIENT)
Dept: FAMILY MEDICINE CLINIC | Age: 54
End: 2019-09-09

## 2019-09-09 VITALS
WEIGHT: 289.6 LBS | OXYGEN SATURATION: 99 % | HEART RATE: 72 BPM | SYSTOLIC BLOOD PRESSURE: 136 MMHG | HEIGHT: 63 IN | BODY MASS INDEX: 51.31 KG/M2 | TEMPERATURE: 98.1 F | RESPIRATION RATE: 18 BRPM | DIASTOLIC BLOOD PRESSURE: 69 MMHG

## 2019-09-09 DIAGNOSIS — I10 ESSENTIAL HYPERTENSION: ICD-10-CM

## 2019-09-09 DIAGNOSIS — R73.03 PREDIABETES: ICD-10-CM

## 2019-09-09 DIAGNOSIS — E78.00 PURE HYPERCHOLESTEROLEMIA: Primary | ICD-10-CM

## 2019-09-09 DIAGNOSIS — E78.00 PURE HYPERCHOLESTEROLEMIA: ICD-10-CM

## 2019-09-09 LAB
ALT SERPL-CCNC: 24 U/L (ref 13–56)
ANION GAP SERPL CALC-SCNC: 4 MMOL/L (ref 3–18)
AST SERPL-CCNC: 13 U/L (ref 10–38)
BUN SERPL-MCNC: 15 MG/DL (ref 7–18)
BUN/CREAT SERPL: 23 (ref 12–20)
CALCIUM SERPL-MCNC: 9.5 MG/DL (ref 8.5–10.1)
CHLORIDE SERPL-SCNC: 103 MMOL/L (ref 100–111)
CO2 SERPL-SCNC: 34 MMOL/L (ref 21–32)
CREAT SERPL-MCNC: 0.66 MG/DL (ref 0.6–1.3)
EST. AVERAGE GLUCOSE BLD GHB EST-MCNC: 131 MG/DL
GLUCOSE SERPL-MCNC: 98 MG/DL (ref 74–99)
HBA1C MFR BLD: 6.2 % (ref 4.2–5.6)
POTASSIUM SERPL-SCNC: 4.4 MMOL/L (ref 3.5–5.5)
SODIUM SERPL-SCNC: 141 MMOL/L (ref 136–145)

## 2019-09-09 PROCEDURE — 80048 BASIC METABOLIC PNL TOTAL CA: CPT

## 2019-09-09 PROCEDURE — 84460 ALANINE AMINO (ALT) (SGPT): CPT

## 2019-09-09 PROCEDURE — 83036 HEMOGLOBIN GLYCOSYLATED A1C: CPT

## 2019-09-09 PROCEDURE — 80061 LIPID PANEL: CPT

## 2019-09-09 PROCEDURE — 84450 TRANSFERASE (AST) (SGOT): CPT

## 2019-09-09 PROCEDURE — 36415 COLL VENOUS BLD VENIPUNCTURE: CPT

## 2019-09-09 NOTE — PATIENT INSTRUCTIONS
DASH Diet: Care Instructions  Your Care Instructions    The DASH diet is an eating plan that can help lower your blood pressure. DASH stands for Dietary Approaches to Stop Hypertension. Hypertension is high blood pressure. The DASH diet focuses on eating foods that are high in calcium, potassium, and magnesium. These nutrients can lower blood pressure. The foods that are highest in these nutrients are fruits, vegetables, low-fat dairy products, nuts, seeds, and legumes. But taking calcium, potassium, and magnesium supplements instead of eating foods that are high in those nutrients does not have the same effect. The DASH diet also includes whole grains, fish, and poultry. The DASH diet is one of several lifestyle changes your doctor may recommend to lower your high blood pressure. Your doctor may also want you to decrease the amount of sodium in your diet. Lowering sodium while following the DASH diet can lower blood pressure even further than just the DASH diet alone. Follow-up care is a key part of your treatment and safety. Be sure to make and go to all appointments, and call your doctor if you are having problems. It's also a good idea to know your test results and keep a list of the medicines you take. How can you care for yourself at home? Following the DASH diet  · Eat 4 to 5 servings of fruit each day. A serving is 1 medium-sized piece of fruit, ½ cup chopped or canned fruit, 1/4 cup dried fruit, or 4 ounces (½ cup) of fruit juice. Choose fruit more often than fruit juice. · Eat 4 to 5 servings of vegetables each day. A serving is 1 cup of lettuce or raw leafy vegetables, ½ cup of chopped or cooked vegetables, or 4 ounces (½ cup) of vegetable juice. Choose vegetables more often than vegetable juice. · Get 2 to 3 servings of low-fat and fat-free dairy each day. A serving is 8 ounces of milk, 1 cup of yogurt, or 1 ½ ounces of cheese. · Eat 6 to 8 servings of grains each day.  A serving is 1 slice of bread, 1 ounce of dry cereal, or ½ cup of cooked rice, pasta, or cooked cereal. Try to choose whole-grain products as much as possible. · Limit lean meat, poultry, and fish to 2 servings each day. A serving is 3 ounces, about the size of a deck of cards. · Eat 4 to 5 servings of nuts, seeds, and legumes (cooked dried beans, lentils, and split peas) each week. A serving is 1/3 cup of nuts, 2 tablespoons of seeds, or ½ cup of cooked beans or peas. · Limit fats and oils to 2 to 3 servings each day. A serving is 1 teaspoon of vegetable oil or 2 tablespoons of salad dressing. · Limit sweets and added sugars to 5 servings or less a week. A serving is 1 tablespoon jelly or jam, ½ cup sorbet, or 1 cup of lemonade. · Eat less than 2,300 milligrams (mg) of sodium a day. If you limit your sodium to 1,500 mg a day, you can lower your blood pressure even more. Tips for success  · Start small. Do not try to make dramatic changes to your diet all at once. You might feel that you are missing out on your favorite foods and then be more likely to not follow the plan. Make small changes, and stick with them. Once those changes become habit, add a few more changes. · Try some of the following:  ? Make it a goal to eat a fruit or vegetable at every meal and at snacks. This will make it easy to get the recommended amount of fruits and vegetables each day. ? Try yogurt topped with fruit and nuts for a snack or healthy dessert. ? Add lettuce, tomato, cucumber, and onion to sandwiches. ? Combine a ready-made pizza crust with low-fat mozzarella cheese and lots of vegetable toppings. Try using tomatoes, squash, spinach, broccoli, carrots, cauliflower, and onions. ? Have a variety of cut-up vegetables with a low-fat dip as an appetizer instead of chips and dip. ? Sprinkle sunflower seeds or chopped almonds over salads. Or try adding chopped walnuts or almonds to cooked vegetables.   ? Try some vegetarian meals using beans and peas. Add garbanzo or kidney beans to salads. Make burritos and tacos with mashed raygoaz beans or black beans. Where can you learn more? Go to http://marcos-stacy.info/. Enter C122 in the search box to learn more about \"DASH Diet: Care Instructions. \"  Current as of: July 22, 2018  Content Version: 12.1  © 6484-8721 Healthwise, [employer]. Care instructions adapted under license by [employer] (which disclaims liability or warranty for this information). If you have questions about a medical condition or this instruction, always ask your healthcare professional. [address] any warranty or liability for your use of this information.

## 2019-09-09 NOTE — PROGRESS NOTES
Patient here for 4 month Follow up. 1. Have you been to the ER, urgent care clinic since your last visit? Hospitalized since your last visit? No    2. Have you seen or consulted any other health care providers outside of the [address] [place] [address] since your last visit? Include any pap smears or colon screening.  No

## 2019-09-09 NOTE — PROGRESS NOTES
HISTORY OF PRESENT ILLNESS  Ish Medel is a 47 y.o. female. HPI  Patient is here today for evaluation and treatment of: 4 Month Follow Up:    4 Month Follow Up: Follow up on HTN, chol; she is on meds as listed below ;  tolerates med. She is compliant with her med regimen. She has gained some weight. He has had some nausea. Has been prescribed prilosec , but does not take med everyday Takes med prn. Wt Readings from Last 3 Encounters:   09/09/19 289 lb 9.6 oz (131.4 kg)   05/06/19 270 lb (122.5 kg)   01/07/19 268 lb 3.2 oz (121.7 kg)           Current Outpatient Medications:     ferrous sulfate (IRON) 325 mg (65 mg iron) tablet, Take 1 Tab by mouth Daily (before breakfast). , Disp: 90 Tab, Rfl: 3    ibuprofen (MOTRIN) 800 mg tablet, TAKE 1 TABLET BY MOUTH THREE TIMES DAILY WITH MEALS, Disp: 90 Tab, Rfl: 0    omeprazole (PRILOSEC) 40 mg capsule, TAKE 1 CAPSULE BY MOUTH ONCE DAILY as needed, Disp: 90 Cap, Rfl: 0    simvastatin (ZOCOR) 10 mg tablet, TAKE 1 TABLET BY MOUTH ONCE NIGHTLY, Disp: 90 Tab, Rfl: 3    losartan-hydroCHLOROthiazide (HYZAAR) 100-12.5 mg per tablet, TAKE 1 TABLET BY MOUTH ONCE DAILY, Disp: 90 Tab, Rfl: 3    amLODIPine (NORVASC) 5 mg tablet, TAKE 1 TABLET BY MOUTH ONCE DAILY, Disp: 90 Tab, Rfl: 3      PMH,  Meds, Allergies, Family History, Social history reviewed    Review of Systems   Constitutional: Negative for chills and fever. Respiratory: Negative for shortness of breath and wheezing. Cardiovascular: Negative for chest pain and palpitations.        Physical Exam   Visit Vitals  /69 (BP 1 Location: Left arm, BP Patient Position: Sitting)   Pulse 72   Temp 98.1 °F (36.7 °C) (Oral)   Resp 18   Ht 5' 3\" (1.6 m)   Wt 289 lb 9.6 oz (131.4 kg)   SpO2 99%   BMI 51.30 kg/m²     General appearance: alert, cooperative, no distress, appears stated age  Neck: supple, symmetrical, trachea midline, no adenopathy, thyroid: not enlarged, symmetric, no tenderness/mass/nodules, no carotid bruit and no JVD  Lungs: clear to auscultation bilaterally  Heart: regular rate and rhythm, S1, S2 normal, no murmur, click, rub or gallop    Extremities: extremities normal, atraumatic, no cyanosis or edema  Lab Results   Component Value Date/Time    Cholesterol, total 163 05/06/2019 11:36 AM    HDL Cholesterol 53 05/06/2019 11:36 AM    LDL, calculated 88.6 05/06/2019 11:36 AM    VLDL, calculated 21.4 05/06/2019 11:36 AM    Triglyceride 107 05/06/2019 11:36 AM    CHOL/HDL Ratio 3.1 05/06/2019 11:36 AM     Lab Results   Component Value Date/Time    Sodium 141 05/06/2019 11:36 AM    Potassium 4.1 05/06/2019 11:36 AM    Chloride 106 05/06/2019 11:36 AM    CO2 29 05/06/2019 11:36 AM    Anion gap 6 05/06/2019 11:36 AM    Glucose 106 (H) 05/06/2019 11:36 AM    BUN 19 (H) 05/06/2019 11:36 AM    Creatinine 0.70 05/06/2019 11:36 AM    BUN/Creatinine ratio 27 (H) 05/06/2019 11:36 AM    GFR est AA >60 05/06/2019 11:36 AM    GFR est non-AA >60 05/06/2019 11:36 AM    Calcium 9.0 05/06/2019 11:36 AM     Lab Results   Component Value Date/Time    Hemoglobin A1c 6.6 (H) 05/06/2019 11:36 AM         ASSESSMENT and PLAN    ICD-10-CM ICD-9-CM    1. Pure hypercholesterolemia E78.00 272.0 LIPID PANEL      AST      ALT   2. Essential hypertension T90 290.8 METABOLIC PANEL, BASIC   3. Prediabetes R73.03 790.29 HEMOGLOBIN A1C WITH EAG       As above,  treatment plan as listed below  Orders Placed This Encounter    LIPID PANEL    METABOLIC PANEL, BASIC    AST    ALT    HEMOGLOBIN A1C WITH EAG     Follow-up and Dispositions    · Return in about 4 months (around 1/9/2020) for htn/chol. An After Visit Summary was printed and given to the patient. This has been fully explained to the patient, who indicates understanding.   Suggested that she try prilosec daily for about 5 days daily then may go back to prn if nausea remits thereafter; may repeat this prn  Labs as ordered  Healthy diet and exercise encouraged

## 2019-09-10 LAB
CHOLEST SERPL-MCNC: 155 MG/DL
HDLC SERPL-MCNC: 48 MG/DL (ref 40–60)
HDLC SERPL: 3.2 {RATIO} (ref 0–5)
LDLC SERPL CALC-MCNC: 86 MG/DL (ref 0–100)
LIPID PROFILE,FLP: NORMAL
TRIGL SERPL-MCNC: 105 MG/DL (ref ?–150)
VLDLC SERPL CALC-MCNC: 21 MG/DL

## 2019-10-16 RX ORDER — OMEPRAZOLE 40 MG/1
CAPSULE, DELAYED RELEASE ORAL
Qty: 90 CAP | Refills: 0 | Status: SHIPPED | OUTPATIENT
Start: 2019-10-16 | End: 2020-01-27

## 2019-11-08 DIAGNOSIS — M17.0 PRIMARY OSTEOARTHRITIS OF KNEES, BILATERAL: ICD-10-CM

## 2019-11-08 DIAGNOSIS — M25.551 RIGHT HIP PAIN: ICD-10-CM

## 2019-11-08 RX ORDER — IBUPROFEN 800 MG/1
TABLET ORAL
Qty: 90 TAB | Refills: 0 | Status: SHIPPED | OUTPATIENT
Start: 2019-11-08 | End: 2020-11-23 | Stop reason: SDUPTHER

## 2020-01-27 RX ORDER — OMEPRAZOLE 40 MG/1
CAPSULE, DELAYED RELEASE ORAL
Qty: 90 CAP | Refills: 0 | Status: SHIPPED | OUTPATIENT
Start: 2020-01-27 | End: 2020-04-30 | Stop reason: SDUPTHER

## 2020-02-20 ENCOUNTER — OFFICE VISIT (OUTPATIENT)
Dept: FAMILY MEDICINE CLINIC | Age: 55
End: 2020-02-20

## 2020-02-20 ENCOUNTER — HOSPITAL ENCOUNTER (OUTPATIENT)
Dept: LAB | Age: 55
Discharge: HOME | End: 2020-02-20
Payer: COMMERCIAL

## 2020-02-20 VITALS
TEMPERATURE: 98.2 F | BODY MASS INDEX: 51.91 KG/M2 | HEIGHT: 63 IN | DIASTOLIC BLOOD PRESSURE: 82 MMHG | WEIGHT: 293 LBS | SYSTOLIC BLOOD PRESSURE: 124 MMHG | OXYGEN SATURATION: 94 % | HEART RATE: 78 BPM | RESPIRATION RATE: 16 BRPM

## 2020-02-20 DIAGNOSIS — E78.00 PURE HYPERCHOLESTEROLEMIA: ICD-10-CM

## 2020-02-20 DIAGNOSIS — R73.03 PREDIABETES: ICD-10-CM

## 2020-02-20 DIAGNOSIS — I10 ESSENTIAL HYPERTENSION: Primary | ICD-10-CM

## 2020-02-20 DIAGNOSIS — I10 ESSENTIAL HYPERTENSION: ICD-10-CM

## 2020-02-20 LAB
ALT SERPL-CCNC: 23 U/L (ref 13–56)
ANION GAP SERPL CALC-SCNC: 1 MMOL/L (ref 3–18)
AST SERPL-CCNC: 12 U/L (ref 10–38)
BUN SERPL-MCNC: 16 MG/DL (ref 7–18)
BUN/CREAT SERPL: 25 (ref 12–20)
CALCIUM SERPL-MCNC: 9.2 MG/DL (ref 8.5–10.1)
CHLORIDE SERPL-SCNC: 108 MMOL/L (ref 100–111)
CHOLEST SERPL-MCNC: 151 MG/DL
CO2 SERPL-SCNC: 32 MMOL/L (ref 21–32)
CREAT SERPL-MCNC: 0.64 MG/DL (ref 0.6–1.3)
EST. AVERAGE GLUCOSE BLD GHB EST-MCNC: 148 MG/DL
GLUCOSE SERPL-MCNC: 108 MG/DL (ref 74–99)
HBA1C MFR BLD: 6.8 % (ref 4.2–5.6)
HDLC SERPL-MCNC: 47 MG/DL (ref 40–60)
HDLC SERPL: 3.2 {RATIO} (ref 0–5)
LDLC SERPL CALC-MCNC: 85 MG/DL (ref 0–100)
LIPID PROFILE,FLP: NORMAL
POTASSIUM SERPL-SCNC: 4.1 MMOL/L (ref 3.5–5.5)
SODIUM SERPL-SCNC: 141 MMOL/L (ref 136–145)
TRIGL SERPL-MCNC: 95 MG/DL (ref ?–150)
VLDLC SERPL CALC-MCNC: 19 MG/DL

## 2020-02-20 PROCEDURE — 84450 TRANSFERASE (AST) (SGOT): CPT

## 2020-02-20 PROCEDURE — 36415 COLL VENOUS BLD VENIPUNCTURE: CPT

## 2020-02-20 PROCEDURE — 83036 HEMOGLOBIN GLYCOSYLATED A1C: CPT

## 2020-02-20 PROCEDURE — 80048 BASIC METABOLIC PNL TOTAL CA: CPT

## 2020-02-20 PROCEDURE — 80061 LIPID PANEL: CPT

## 2020-02-20 PROCEDURE — 84460 ALANINE AMINO (ALT) (SGPT): CPT

## 2020-02-20 NOTE — PROGRESS NOTES
1. Have you been to the ER, urgent care clinic since your last visit? Hospitalized since your last visit? No    2. Have you seen or consulted any other health care providers outside of the [address] [place] [address] since your last visit? Include any pap smears or colon screening.  Yes - Chris García MD - gynecology

## 2020-02-20 NOTE — PATIENT INSTRUCTIONS
DASH Diet: Care Instructions  Your Care Instructions    The DASH diet is an eating plan that can help lower your blood pressure. DASH stands for Dietary Approaches to Stop Hypertension. Hypertension is high blood pressure. The DASH diet focuses on eating foods that are high in calcium, potassium, and magnesium. These nutrients can lower blood pressure. The foods that are highest in these nutrients are fruits, vegetables, low-fat dairy products, nuts, seeds, and legumes. But taking calcium, potassium, and magnesium supplements instead of eating foods that are high in those nutrients does not have the same effect. The DASH diet also includes whole grains, fish, and poultry. The DASH diet is one of several lifestyle changes your doctor may recommend to lower your high blood pressure. Your doctor may also want you to decrease the amount of sodium in your diet. Lowering sodium while following the DASH diet can lower blood pressure even further than just the DASH diet alone. Follow-up care is a key part of your treatment and safety. Be sure to make and go to all appointments, and call your doctor if you are having problems. It's also a good idea to know your test results and keep a list of the medicines you take. How can you care for yourself at home? Following the DASH diet  · Eat 4 to 5 servings of fruit each day. A serving is 1 medium-sized piece of fruit, ½ cup chopped or canned fruit, 1/4 cup dried fruit, or 4 ounces (½ cup) of fruit juice. Choose fruit more often than fruit juice. · Eat 4 to 5 servings of vegetables each day. A serving is 1 cup of lettuce or raw leafy vegetables, ½ cup of chopped or cooked vegetables, or 4 ounces (½ cup) of vegetable juice. Choose vegetables more often than vegetable juice. · Get 2 to 3 servings of low-fat and fat-free dairy each day. A serving is 8 ounces of milk, 1 cup of yogurt, or 1 ½ ounces of cheese. · Eat 6 to 8 servings of grains each day.  A serving is 1 slice of bread, 1 ounce of dry cereal, or ½ cup of cooked rice, pasta, or cooked cereal. Try to choose whole-grain products as much as possible. · Limit lean meat, poultry, and fish to 2 servings each day. A serving is 3 ounces, about the size of a deck of cards. · Eat 4 to 5 servings of nuts, seeds, and legumes (cooked dried beans, lentils, and split peas) each week. A serving is 1/3 cup of nuts, 2 tablespoons of seeds, or ½ cup of cooked beans or peas. · Limit fats and oils to 2 to 3 servings each day. A serving is 1 teaspoon of vegetable oil or 2 tablespoons of salad dressing. · Limit sweets and added sugars to 5 servings or less a week. A serving is 1 tablespoon jelly or jam, ½ cup sorbet, or 1 cup of lemonade. · Eat less than 2,300 milligrams (mg) of sodium a day. If you limit your sodium to 1,500 mg a day, you can lower your blood pressure even more. Tips for success  · Start small. Do not try to make dramatic changes to your diet all at once. You might feel that you are missing out on your favorite foods and then be more likely to not follow the plan. Make small changes, and stick with them. Once those changes become habit, add a few more changes. · Try some of the following:  ? Make it a goal to eat a fruit or vegetable at every meal and at snacks. This will make it easy to get the recommended amount of fruits and vegetables each day. ? Try yogurt topped with fruit and nuts for a snack or healthy dessert. ? Add lettuce, tomato, cucumber, and onion to sandwiches. ? Combine a ready-made pizza crust with low-fat mozzarella cheese and lots of vegetable toppings. Try using tomatoes, squash, spinach, broccoli, carrots, cauliflower, and onions. ? Have a variety of cut-up vegetables with a low-fat dip as an appetizer instead of chips and dip. ? Sprinkle sunflower seeds or chopped almonds over salads. Or try adding chopped walnuts or almonds to cooked vegetables.   ? Try some vegetarian meals using beans and peas. Add garbanzo or kidney beans to salads. Make burritos and tacos with mashed raygoza beans or black beans. Where can you learn more? Go to http://marcos-stacy.info/. Enter A206 in the search box to learn more about \"DASH Diet: Care Instructions. \"  Current as of: April 9, 2019  Content Version: 12.2  © 2241-7997 [employer]. Care instructions adapted under license by [employer] (which disclaims liability or warranty for this information). If you have questions about a medical condition or this instruction, always ask your healthcare professional. [address] any warranty or liability for your use of this information. Meralgia Paresthetica: Care Instructions  Your Care Instructions  Meralgia paresthetica (say \"muh-RAL-juh fuw-[id number]-CZIC-ick-uh\") is pain and numbness in the outer part of your thigh. The pain might get worse after you walk or stand for a long time. This pain and numbness occur when a nerve in your thigh is pinched (compressed). Sometimes the problem is caused by wearing tight clothing or being overweight. Most of the time the problem goes away on its own in a few months. Lowering any pressure on the thigh area may help. Wear loose clothes, and lose weight if you need to. Follow-up care is a key part of your treatment and safety. Be sure to make and go to all appointments, and call your doctor if you are having problems. It's also a good idea to know your test results and keep a list of the medicines you take. How can you care for yourself at home? · Most times the problem gets better on its own. Try wearing loose clothing to see if this helps. · Lose weight if you need to. Talk with your doctor if you need help. When should you call for help?   Watch closely for changes in your health, and be sure to contact your doctor if:    · You have new symptoms, such as pain that gets worse or new numbness in your thigh.     · You do not get better as expected. Where can you learn more? Go to http://marcos-stacy.info/. Enter L943 in the search box to learn more about \"Meralgia Paresthetica: Care Instructions. \"  Current as of: March 28, 2019  Content Version: 12.2  © 2906-5563 [employer]. Care instructions adapted under license by [employer] (which disclaims liability or warranty for this information). If you have questions about a medical condition or this instruction, always ask your healthcare professional. [address] any warranty or liability for your use of this information.

## 2020-04-01 ENCOUNTER — E-VISIT (OUTPATIENT)
Dept: FAMILY MEDICINE CLINIC | Age: 55
End: 2020-04-01

## 2020-04-01 ENCOUNTER — TELEPHONE (OUTPATIENT)
Dept: FAMILY MEDICINE CLINIC | Age: 55
End: 2020-04-01

## 2020-04-01 NOTE — LETTER
NOTIFICATION RETURN TO WORK / SCHOOL 
 
4/1/2020 2:16 PM 
 
Ms. Irvin Burton [phone] To Whom It May Concern: 
 
Irvin Burton [facility] 
 
Mrs. Ren Nolan has a medical condition that is consider high risk and should be allowed to do telework at home due to the COVID-19 pandemic. If there are questions or concerns please have the patient contact our office. Sincerely, Day Pereira MD

## 2020-04-01 NOTE — TELEPHONE ENCOUNTER
Pt requesting a note for her job stating she is high risk due to her BMI > 40 and that she is able to do telework at home. Will oblige. 5-10 minutes were spent on the digital evaluation and management of this patient.

## 2020-04-30 NOTE — TELEPHONE ENCOUNTER
Last Visit: 2/20/20 with MD Robertha Homans  Next Appointment: 6/18/20 with MD Robertha Homans  Previous Refill Encounter(s): 1/27/20 #90    Requested Prescriptions     Pending Prescriptions Disp Refills    omeprazole (PRILOSEC) 40 mg capsule 90 Cap 0     Sig: Take 1 Cap by mouth daily as needed.

## 2020-05-01 DIAGNOSIS — I10 ESSENTIAL HYPERTENSION: ICD-10-CM

## 2020-05-02 RX ORDER — OMEPRAZOLE 40 MG/1
40 CAPSULE, DELAYED RELEASE ORAL
Qty: 90 CAP | Refills: 3 | Status: SHIPPED | OUTPATIENT
Start: 2020-05-02 | End: 2021-05-12

## 2020-05-02 RX ORDER — AMLODIPINE BESYLATE 5 MG/1
TABLET ORAL
Qty: 90 TAB | Refills: 0 | Status: SHIPPED | OUTPATIENT
Start: 2020-05-02 | End: 2020-08-04 | Stop reason: SDUPTHER

## 2020-05-02 RX ORDER — LOSARTAN POTASSIUM AND HYDROCHLOROTHIAZIDE 12.5; 1 MG/1; MG/1
TABLET ORAL
Qty: 90 TAB | Refills: 0 | Status: SHIPPED | OUTPATIENT
Start: 2020-05-02 | End: 2020-08-14

## 2020-05-27 NOTE — TELEPHONE ENCOUNTER
Last visit 04/01/2020 E-Visit MD Russel Hyunh   Next appointment 06/18/2020 MD Bernstein   Previous refill encounter(s) 05/07/2019 Ferrous Sulfate #90 with 3 refills     Requested Prescriptions     Pending Prescriptions Disp Refills    ferrous sulfate (Iron) 325 mg (65 mg iron) tablet 90 Tab 0     Sig: Take 1 Tab by mouth Daily (before breakfast).

## 2020-05-29 RX ORDER — LANOLIN ALCOHOL/MO/W.PET/CERES
325 CREAM (GRAM) TOPICAL
Qty: 90 TAB | Refills: 0 | Status: SHIPPED | OUTPATIENT
Start: 2020-05-29 | End: 2020-09-01

## 2020-06-16 ENCOUNTER — VIRTUAL VISIT (OUTPATIENT)
Dept: FAMILY MEDICINE CLINIC | Age: 55
End: 2020-06-16

## 2020-06-16 DIAGNOSIS — D50.9 IRON DEFICIENCY ANEMIA, UNSPECIFIED IRON DEFICIENCY ANEMIA TYPE: ICD-10-CM

## 2020-06-16 DIAGNOSIS — E55.9 VITAMIN D DEFICIENCY: ICD-10-CM

## 2020-06-16 DIAGNOSIS — I10 ESSENTIAL HYPERTENSION: Primary | ICD-10-CM

## 2020-06-16 DIAGNOSIS — E78.00 PURE HYPERCHOLESTEROLEMIA: ICD-10-CM

## 2020-06-16 DIAGNOSIS — R73.03 PREDIABETES: ICD-10-CM

## 2020-06-16 NOTE — LETTER
NOTIFICATION RETURN TO WORK / SCHOOL 
 
6/16/2020 4:56 PM 
 
Ms. Oh Harvey [phone] To Whom It May Concern: 
 
Oh Harvey [facility] 
 
 
Mrs. Betzaida Tracy has a medical condition that is consider high risk and should be allowed to do telework at home due to the COVID-19 pandemic. If there are questions or concerns please have the patient contact our office. Sincerely, Milton Calabrese MD

## 2020-06-16 NOTE — PROGRESS NOTES
Marco Perez is a 54 y.o. female who was seen by synchronous (real-time) audio-video technology on 6/16/2020. Consent: Marco Perez, who was seen by synchronous (real-time) audio-video technology, and/or her healthcare decision maker, is aware that this patient-initiated, Telehealth encounter on 6/16/2020 is a billable service, with coverage as determined by her insurance carrier. She is aware that she may receive a bill and has provided verbal consent to proceed: Yes. Assessment & Plan:   Diagnoses and all orders for this visit:    1. Essential hypertension  -     METABOLIC PANEL, BASIC; Future    2. Pure hypercholesterolemia  -     LIPID PANEL; Future  -     AST; Future  -     ALT; Future    3. Prediabetes  -     HEMOGLOBIN A1C WITH EAG; Future    4. Iron deficiency anemia, unspecified iron deficiency anemia type  -     CBC WITH AUTOMATED DIFF; Future    5. Vitamin D deficiency  -     VITAMIN D, 25 HYDROXY; Future      As above,   above all stable unless otherwise noted   treatment plan as listed below  Orders Placed This Encounter    LIPID PANEL    METABOLIC PANEL, BASIC    AST    ALT    HEMOGLOBIN A1C WITH EAG    VITAMIN D, 25 HYDROXY    CBC WITH AUTOMATED DIFF     Follow-up and Dispositions    · Return in about 4 months (around 10/16/2020), or if symptoms worsen or fail to improve, for well exam.           712  Subjective:   Marco Perez is a 54 y.o. female who was seen for Hypertension; Cholesterol Problem; High Blood Sugar; Anemia; and Vitamin D Deficiency      Pt has been well; She has been walking for exercise. HTN:  BP has been stable  Cholesterol: On zocor; takes med daily  Vitamin D deficiency: needs follow up lab on this  She has gained some weight back; reports some dietary indiscretions since the quarantine.     Wt Readings from Last 3 Encounters:   02/20/20 302 lb (137 kg)   09/09/19 289 lb 9.6 oz (131.4 kg)   05/06/19 270 lb (122.5 kg)       Prior to Admission medications    Medication Sig Start Date End Date Taking? Authorizing Provider   ferrous sulfate (Iron) 325 mg (65 mg iron) tablet Take 1 Tab by mouth Daily (before breakfast). 5/29/20  Yes Elizabeth Sharp MD   omeprazole (PRILOSEC) 40 mg capsule Take 1 Cap by mouth daily as needed (gerd). 5/2/20  Yes Elizabeth Sharp MD   amLODIPine (NORVASC) 5 mg tablet Take 1 tablet by mouth once daily 5/2/20  Yes Elizabeth Sharp MD   losartan-hydroCHLOROthiazide [facility]) 100-12.5 mg per tablet Take 1 tablet by mouth once daily 5/2/20  Yes Elizabeth Sharp MD   simvastatin (ZOCOR) 10 mg tablet TAKE 1 TABLET BY MOUTH ONCE NIGHTLY 4/30/19  Yes Elizabeth Sharp MD   ibuprofen (MOTRIN) 800 mg tablet TAKE 1 TABLET BY MOUTH THREE TIMES DAILY WITH MEALS  Patient taking differently: 800 mg every eight (8) hours as needed. 11/8/19   Marleen Smoke, PA     Allergies   Allergen Reactions    Lisinopril Cough       Patient Active Problem List    Diagnosis Date Noted    Obesity, morbid ([address] 03/20/2018    Sleep apnea     Knee pain, left 01/21/2014    Prediabetes 05/01/2013    Hypercholesterolemia 07/20/2011    Vitamin D deficiency 05/27/2010    DDD (degenerative disc disease), cervical 05/27/2010    HTN (hypertension) 05/27/2010    Iron deficiency anemia 05/27/2010     Current Outpatient Medications   Medication Sig Dispense Refill    ferrous sulfate (Iron) 325 mg (65 mg iron) tablet Take 1 Tab by mouth Daily (before breakfast). 90 Tab 0    omeprazole (PRILOSEC) 40 mg capsule Take 1 Cap by mouth daily as needed (gerd).  90 Cap 3    amLODIPine (NORVASC) 5 mg tablet Take 1 tablet by mouth once daily 90 Tab 0    losartan-hydroCHLOROthiazide (HYZAAR) 100-12.5 mg per tablet Take 1 tablet by mouth once daily 90 Tab 0    simvastatin (ZOCOR) 10 mg tablet TAKE 1 TABLET BY MOUTH ONCE NIGHTLY 90 Tab 3    ibuprofen (MOTRIN) 800 mg tablet TAKE 1 TABLET BY MOUTH THREE TIMES DAILY WITH MEALS (Patient taking differently: 800 mg every eight (8) hours as needed.) 90 Tab 0     Allergies   Allergen Reactions    Lisinopril Cough     Past Medical History:   Diagnosis Date    Allergic rhinitis     Anemia     Arthritis     BMI 45.0-49.9, adult ([address]     49.5    Conjunctivitis, bacterial 04    left    Fibroids     GERD (gastroesophageal reflux disease)     Hiatal hernia     High cholesterol     Hypercholesterolemia -    TSH 0.6    Hypertension     Knee pain, left     Pelvic pain     Sleep apnea     Does not use CPAP    Uterine fibroid     Vitamin D deficiencies 10-     (10)          (25)    Vitamin D deficiency      Past Surgical History:   Procedure Laterality Date    COLONOSCOPY N/A 2016    COLONOSCOPY performed by Donta Worley MD at  [address]      2 times    HX  SECTION  97 ; 01    HX COLONOSCOPY      HX DILATION AND CURETTAGE      3 times    HX ENDOSCOPY      HX MYOMECTOMY      HX MYOMECTOMY       Family History   Problem Relation Age of Onset    Hypertension Mother     Diabetes Maternal Uncle     Breast Cancer Maternal Grandmother     Heart Attack Maternal Grandfather     Hypertension Paternal Grandmother     Cancer Mother         uterine    Cancer Father         brain    Cancer Maternal Aunt         breast    Cancer Maternal Grandmother         breast    Heart Disease Paternal Grandfather     Cancer Cousin         breast     Social History     Tobacco Use    Smoking status: Never Smoker    Smokeless tobacco: Never Used   Substance Use Topics    Alcohol use: Yes     Alcohol/week: 0.0 standard drinks     Types: 2 Glasses of wine per week     Comment: on ocassion       ROS      Objective: There were no vitals taken for this visit.    General: alert, cooperative, no distress   Mental  status: normal mood, behavior, speech, dress, motor activity, and thought processes, able to follow commands   HENT: NCAT   Neck: no visualized mass   Resp: no respiratory distress   Neuro: no gross deficits   Skin: no discoloration or lesions of concern on visible areas   Psychiatric: normal affect, consistent with stated mood, no evidence of hallucinations     Additional exam findings: none      We discussed the expected course, resolution and complications of the diagnosis(es) in detail. Medication risks, benefits, costs, interactions, and alternatives were discussed as indicated. I advised her to contact the office if her condition worsens, changes or fails to improve as anticipated. She expressed understanding with the diagnosis(es) and plan. Stephanie Joshi is a 54 y.o. female who was evaluated by a video visit encounter for concerns as above. Patient identification was verified prior to start of the visit. A caregiver was present when appropriate. Due to this being a TeleHealth encounter (During XRB-88 public health emergency), evaluation of the following organ systems was limited: Vitals/Constitutional/EENT/Resp/CV/GI//MS/Neuro/Skin/Heme-Lymph-Imm. Pursuant to the emergency declaration under the [facility], 1135 waiver authority and the [employer] Act, this Virtual  Visit was conducted, with patient's (and/or legal guardian's) consent, to reduce the patient's risk of exposure to COVID-19 and provide necessary medical care. Services were provided through a video synchronous discussion virtually to substitute for in-person clinic visit. Patient was at home and provider was at the office.     Andrea Hobbs MD

## 2020-06-17 ENCOUNTER — TELEPHONE (OUTPATIENT)
Dept: FAMILY MEDICINE CLINIC | Age: 55
End: 2020-06-17

## 2020-06-17 NOTE — TELEPHONE ENCOUNTER
Called and spoke with patient letting her know her letter was signed and ready for . Patient verbalized understanding and scheduled a lab appointment for 06/18/20 and stated she would  letter then.    Nils Martino LPN

## 2020-06-18 ENCOUNTER — HOSPITAL ENCOUNTER (OUTPATIENT)
Dept: LAB | Age: 55
Discharge: HOME | End: 2020-06-18
Payer: COMMERCIAL

## 2020-06-18 DIAGNOSIS — I10 ESSENTIAL HYPERTENSION: ICD-10-CM

## 2020-06-18 DIAGNOSIS — D50.9 IRON DEFICIENCY ANEMIA, UNSPECIFIED IRON DEFICIENCY ANEMIA TYPE: ICD-10-CM

## 2020-06-18 DIAGNOSIS — E55.9 VITAMIN D DEFICIENCY: ICD-10-CM

## 2020-06-18 DIAGNOSIS — R73.03 PREDIABETES: ICD-10-CM

## 2020-06-18 DIAGNOSIS — E78.00 PURE HYPERCHOLESTEROLEMIA: ICD-10-CM

## 2020-06-18 LAB
25(OH)D3 SERPL-MCNC: 29 NG/ML (ref 30–100)
ALT SERPL-CCNC: 24 U/L (ref 13–56)
ANION GAP SERPL CALC-SCNC: 6 MMOL/L (ref 3–18)
AST SERPL-CCNC: 12 U/L (ref 10–38)
BASOPHILS # BLD: 0 K/UL (ref 0–0.1)
BASOPHILS NFR BLD: 0 % (ref 0–2)
BUN SERPL-MCNC: 18 MG/DL (ref 7–18)
BUN/CREAT SERPL: 24 (ref 12–20)
CALCIUM SERPL-MCNC: 9.5 MG/DL (ref 8.5–10.1)
CHLORIDE SERPL-SCNC: 106 MMOL/L (ref 100–111)
CHOLEST SERPL-MCNC: 166 MG/DL
CO2 SERPL-SCNC: 30 MMOL/L (ref 21–32)
CREAT SERPL-MCNC: 0.74 MG/DL (ref 0.6–1.3)
DIFFERENTIAL METHOD BLD: ABNORMAL
EOSINOPHIL # BLD: 0.2 K/UL (ref 0–0.4)
EOSINOPHIL NFR BLD: 2 % (ref 0–5)
ERYTHROCYTE [DISTWIDTH] IN BLOOD BY AUTOMATED COUNT: 15.7 % (ref 11.6–14.5)
EST. AVERAGE GLUCOSE BLD GHB EST-MCNC: 146 MG/DL
GLUCOSE SERPL-MCNC: 142 MG/DL (ref 74–99)
HBA1C MFR BLD: 6.7 % (ref 4.2–5.6)
HCT VFR BLD AUTO: 36.1 % (ref 35–45)
HDLC SERPL-MCNC: 45 MG/DL (ref 40–60)
HDLC SERPL: 3.7 {RATIO} (ref 0–5)
HGB BLD-MCNC: 11.6 G/DL (ref 12–16)
LDLC SERPL CALC-MCNC: 91 MG/DL (ref 0–100)
LIPID PROFILE,FLP: ABNORMAL
LYMPHOCYTES # BLD: 2.3 K/UL (ref 0.9–3.6)
LYMPHOCYTES NFR BLD: 34 % (ref 21–52)
MCH RBC QN AUTO: 26 PG (ref 24–34)
MCHC RBC AUTO-ENTMCNC: 32.1 G/DL (ref 31–37)
MCV RBC AUTO: 80.8 FL (ref 74–97)
MONOCYTES # BLD: 0.4 K/UL (ref 0.05–1.2)
MONOCYTES NFR BLD: 6 % (ref 3–10)
NEUTS SEG # BLD: 4 K/UL (ref 1.8–8)
NEUTS SEG NFR BLD: 58 % (ref 40–73)
PLATELET # BLD AUTO: 292 K/UL (ref 135–420)
PMV BLD AUTO: 10.8 FL (ref 9.2–11.8)
POTASSIUM SERPL-SCNC: 4.3 MMOL/L (ref 3.5–5.5)
RBC # BLD AUTO: 4.47 M/UL (ref 4.2–5.3)
SODIUM SERPL-SCNC: 142 MMOL/L (ref 136–145)
TRIGL SERPL-MCNC: 150 MG/DL (ref ?–150)
VLDLC SERPL CALC-MCNC: 30 MG/DL
WBC # BLD AUTO: 6.9 K/UL (ref 4.6–13.2)

## 2020-06-18 PROCEDURE — 82306 VITAMIN D 25 HYDROXY: CPT

## 2020-06-18 PROCEDURE — 84460 ALANINE AMINO (ALT) (SGPT): CPT

## 2020-06-18 PROCEDURE — 80061 LIPID PANEL: CPT

## 2020-06-18 PROCEDURE — 84450 TRANSFERASE (AST) (SGOT): CPT

## 2020-06-18 PROCEDURE — 83036 HEMOGLOBIN GLYCOSYLATED A1C: CPT

## 2020-06-18 PROCEDURE — 80048 BASIC METABOLIC PNL TOTAL CA: CPT

## 2020-06-18 PROCEDURE — 36415 COLL VENOUS BLD VENIPUNCTURE: CPT

## 2020-06-18 PROCEDURE — 85025 COMPLETE CBC W/AUTO DIFF WBC: CPT

## 2020-06-21 NOTE — PATIENT INSTRUCTIONS
DASH Diet: Care Instructions Your Care Instructions The DASH diet is an eating plan that can help lower your blood pressure. DASH stands for Dietary Approaches to Stop Hypertension. Hypertension is high blood pressure. The DASH diet focuses on eating foods that are high in calcium, potassium, and magnesium. These nutrients can lower blood pressure. The foods that are highest in these nutrients are fruits, vegetables, low-fat dairy products, nuts, seeds, and legumes. But taking calcium, potassium, and magnesium supplements instead of eating foods that are high in those nutrients does not have the same effect. The DASH diet also includes whole grains, fish, and poultry. The DASH diet is one of several lifestyle changes your doctor may recommend to lower your high blood pressure. Your doctor may also want you to decrease the amount of sodium in your diet. Lowering sodium while following the DASH diet can lower blood pressure even further than just the DASH diet alone. Follow-up care is a key part of your treatment and safety. Be sure to make and go to all appointments, and call your doctor if you are having problems. It's also a good idea to know your test results and keep a list of the medicines you take. How can you care for yourself at home? Following the DASH diet · Eat 4 to 5 servings of fruit each day. A serving is 1 medium-sized piece of fruit, ½ cup chopped or canned fruit, 1/4 cup dried fruit, or 4 ounces (½ cup) of fruit juice. Choose fruit more often than fruit juice. · Eat 4 to 5 servings of vegetables each day. A serving is 1 cup of lettuce or raw leafy vegetables, ½ cup of chopped or cooked vegetables, or 4 ounces (½ cup) of vegetable juice. Choose vegetables more often than vegetable juice. · Get 2 to 3 servings of low-fat and fat-free dairy each day. A serving is 8 ounces of milk, 1 cup of yogurt, or 1 ½ ounces of cheese. · Eat 6 to 8 servings of grains each day. A serving is 1 slice of bread, 1 ounce of dry cereal, or ½ cup of cooked rice, pasta, or cooked cereal. Try to choose whole-grain products as much as possible. · Limit lean meat, poultry, and fish to 2 servings each day. A serving is 3 ounces, about the size of a deck of cards. · Eat 4 to 5 servings of nuts, seeds, and legumes (cooked dried beans, lentils, and split peas) each week. A serving is 1/3 cup of nuts, 2 tablespoons of seeds, or ½ cup of cooked beans or peas. · Limit fats and oils to 2 to 3 servings each day. A serving is 1 teaspoon of vegetable oil or 2 tablespoons of salad dressing. · Limit sweets and added sugars to 5 servings or less a week. A serving is 1 tablespoon jelly or jam, ½ cup sorbet, or 1 cup of lemonade. · Eat less than 2,300 milligrams (mg) of sodium a day. If you limit your sodium to 1,500 mg a day, you can lower your blood pressure even more. Tips for success · Start small. Do not try to make dramatic changes to your diet all at once. You might feel that you are missing out on your favorite foods and then be more likely to not follow the plan. Make small changes, and stick with them. Once those changes become habit, add a few more changes. · Try some of the following: ? Make it a goal to eat a fruit or vegetable at every meal and at snacks. This will make it easy to get the recommended amount of fruits and vegetables each day. ? Try yogurt topped with fruit and nuts for a snack or healthy dessert. ? Add lettuce, tomato, cucumber, and onion to sandwiches. ? Combine a ready-made pizza crust with low-fat mozzarella cheese and lots of vegetable toppings. Try using tomatoes, squash, spinach, broccoli, carrots, cauliflower, and onions. ? Have a variety of cut-up vegetables with a low-fat dip as an appetizer instead of chips and dip. ? Sprinkle sunflower seeds or chopped almonds over salads.  Or try adding chopped walnuts or almonds to cooked vegetables. ? Try some vegetarian meals using beans and peas. Add garbanzo or kidney beans to salads. Make burritos and tacos with mashed raygoza beans or black beans. Where can you learn more? Go to http://marcos-stacy.info/ Enter J909 in the search box to learn more about \"DASH Diet: Care Instructions. \" Current as of: December 16, 2019               Content Version: 12.5 © 1574-0701 [employer]. Care instructions adapted under license by [employer] (which disclaims liability or warranty for this information). If you have questions about a medical condition or this instruction, always ask your healthcare professional. [address] any warranty or liability for your use of this information.

## 2020-08-04 RX ORDER — AMLODIPINE BESYLATE 5 MG/1
TABLET ORAL
Qty: 90 TAB | Refills: 0 | Status: SHIPPED | OUTPATIENT
Start: 2020-08-04 | End: 2020-11-12

## 2020-09-01 RX ORDER — TITANIUM DIOXIDE/OXYBEN/CINOX
LOTION (ML) TOPICAL
Qty: 90 TAB | Refills: 0 | Status: SHIPPED | OUTPATIENT
Start: 2020-09-01 | End: 2020-12-06 | Stop reason: SDUPTHER

## 2020-11-15 DIAGNOSIS — M17.0 PRIMARY OSTEOARTHRITIS OF KNEES, BILATERAL: ICD-10-CM

## 2020-11-15 DIAGNOSIS — M25.551 RIGHT HIP PAIN: ICD-10-CM

## 2020-11-23 RX ORDER — IBUPROFEN 800 MG/1
TABLET ORAL
Qty: 90 TAB | Refills: 0 | Status: SHIPPED | OUTPATIENT
Start: 2020-11-23 | End: 2021-09-11 | Stop reason: SDUPTHER

## 2020-12-10 NOTE — TELEPHONE ENCOUNTER
Last Visit: 6/16/20 with MD Tommy Medina  Next Appointment: Brenda Easley to follow-up in 4 months  Previous Refill Encounter(s): 9/1/20 #90    Requested Prescriptions     Pending Prescriptions Disp Refills    ferrous sulfate (Iron) 325 mg (65 mg iron) tablet 90 Tab 0     Sig: Take 1 Tab by mouth daily.

## 2020-12-11 RX ORDER — LANOLIN ALCOHOL/MO/W.PET/CERES
325 CREAM (GRAM) TOPICAL DAILY
Qty: 90 TAB | Refills: 0 | Status: SHIPPED | OUTPATIENT
Start: 2020-12-11 | End: 2021-03-23

## 2021-02-06 LAB — SARS-COV-2, NAA: NON REACTIVE

## 2021-03-23 RX ORDER — LANOLIN ALCOHOL/MO/W.PET/CERES
CREAM (GRAM) TOPICAL
Qty: 90 TAB | Refills: 0 | Status: SHIPPED | OUTPATIENT
Start: 2021-03-23 | End: 2021-07-02

## 2021-05-12 RX ORDER — OMEPRAZOLE 40 MG/1
CAPSULE, DELAYED RELEASE ORAL
Qty: 90 CAP | Refills: 0 | Status: SHIPPED | OUTPATIENT
Start: 2021-05-12 | End: 2021-08-06

## 2021-06-09 ENCOUNTER — TELEPHONE (OUTPATIENT)
Dept: FAMILY MEDICINE CLINIC | Age: 56
End: 2021-06-09

## 2021-06-09 NOTE — TELEPHONE ENCOUNTER
Patient is requesting a return to work note that states she can do no more than 2 days a week in office. Stated her job says the original note has  and they need the new one by 21. Patient stated this cannot wait until her appointment on 21.

## 2021-07-02 RX ORDER — LANOLIN ALCOHOL/MO/W.PET/CERES
CREAM (GRAM) TOPICAL
Qty: 90 TABLET | Refills: 0 | Status: SHIPPED | OUTPATIENT
Start: 2021-07-02 | End: 2021-07-15

## 2021-07-07 ENCOUNTER — OFFICE VISIT (OUTPATIENT)
Dept: FAMILY MEDICINE CLINIC | Age: 56
End: 2021-07-07
Payer: COMMERCIAL

## 2021-07-07 VITALS
BODY MASS INDEX: 51.91 KG/M2 | TEMPERATURE: 98.1 F | DIASTOLIC BLOOD PRESSURE: 83 MMHG | WEIGHT: 293 LBS | HEART RATE: 76 BPM | OXYGEN SATURATION: 93 % | RESPIRATION RATE: 16 BRPM | HEIGHT: 63 IN | SYSTOLIC BLOOD PRESSURE: 137 MMHG

## 2021-07-07 DIAGNOSIS — Z00.00 WELL WOMAN EXAM (NO GYNECOLOGICAL EXAM): Primary | ICD-10-CM

## 2021-07-07 DIAGNOSIS — E55.9 VITAMIN D DEFICIENCY: ICD-10-CM

## 2021-07-07 DIAGNOSIS — E78.00 HYPERCHOLESTEROLEMIA: ICD-10-CM

## 2021-07-07 DIAGNOSIS — E66.01 MORBID OBESITY WITH BMI OF 50.0-59.9, ADULT (HCC): ICD-10-CM

## 2021-07-07 DIAGNOSIS — D50.9 IRON DEFICIENCY ANEMIA, UNSPECIFIED IRON DEFICIENCY ANEMIA TYPE: ICD-10-CM

## 2021-07-07 DIAGNOSIS — R73.9 ELEVATED BLOOD SUGAR: ICD-10-CM

## 2021-07-07 DIAGNOSIS — I10 ESSENTIAL HYPERTENSION: ICD-10-CM

## 2021-07-07 PROCEDURE — 99396 PREV VISIT EST AGE 40-64: CPT | Performed by: FAMILY MEDICINE

## 2021-07-07 NOTE — PROGRESS NOTES
Subjective:   64 y.o. female for Well Woman Check. Her gyne and breast care is done elsewhere by her Ob-Gyne physician. ( Dr. Parrish Norman )    She wants to lose weight. She has been well she has no other complaints  Due for blood work for her chronic conditions including high blood pressure, hypercholesterolemia, and anemia. She additionally has a history of vitamin D deficiency.   Patient Active Problem List    Diagnosis Date Noted    Obesity, morbid ([address] 03/20/2018    Sleep apnea     Knee pain, left 01/21/2014    Prediabetes 05/01/2013    Hypercholesterolemia 07/20/2011    Vitamin D deficiency 05/27/2010    DDD (degenerative disc disease), cervical 05/27/2010    HTN (hypertension) 05/27/2010    Iron deficiency anemia 05/27/2010     Current Outpatient Medications   Medication Sig Dispense Refill    ferrous sulfate 325 mg (65 mg iron) tablet Take 1 tablet by mouth once daily 90 Tablet 0    omeprazole (PRILOSEC) 40 mg capsule TAKE 1 CAPSULE BY MOUTH ONCE DAILY AS NEEDED FOR GERD 90 Cap 0    ibuprofen (MOTRIN) 800 mg tablet TAKE 1 TABLET BY MOUTH THREE TIMES DAILY WITH MEALS 90 Tab 0    amLODIPine (NORVASC) 5 mg tablet Take 1 tablet by mouth once daily 90 Tab 3    losartan-hydroCHLOROthiazide (HYZAAR) 100-12.5 mg per tablet Take 1 tablet by mouth once daily 90 Tab 3    simvastatin (ZOCOR) 10 mg tablet Take 1 tablet by mouth nightly 90 Tab 3     Allergies   Allergen Reactions    Lisinopril Cough     Past Medical History:   Diagnosis Date    Allergic rhinitis     Anemia     Arthritis     BMI 45.0-49.9, adult (HCC)     49.5    Conjunctivitis, bacterial 1-9-04    left    Fibroids     GERD (gastroesophageal reflux disease)     Hiatal hernia     High cholesterol     Hypercholesterolemia 2-2008    TSH 0.6    Hypertension     Knee pain, left     Pelvic pain     Sleep apnea     Does not use CPAP    Uterine fibroid     Vitamin D deficiencies      (10)          (25)    Vitamin D deficiency      Past Surgical History:   Procedure Laterality Date    COLONOSCOPY N/A 2016    COLONOSCOPY performed by Ann Martini MD at  [facility]  [address]      2 times    HX  SECTION  97 ; 01    HX COLONOSCOPY      HX DILATION AND CURETTAGE      3 times    HX ENDOSCOPY      HX MYOMECTOMY      HX MYOMECTOMY       Family History   Problem Relation Age of Onset    Hypertension Mother     Diabetes Maternal Uncle     Breast Cancer Maternal Grandmother     Heart Attack Maternal Grandfather     Hypertension Paternal Grandmother     Cancer Mother         uterine    Cancer Father         brain    Cancer Maternal Aunt         breast    Cancer Maternal Grandmother         breast    Heart Disease Paternal Grandfather     Cancer Cousin         breast        Lab Results   Component Value Date/Time    Hemoglobin A1c 6.7 (H) 2020 08:16 AM    Hemoglobin A1c 6.8 (H) 2020 11:41 AM    Hemoglobin A1c 6.2 (H) 2019 11:45 AM    Glucose 142 (H) 2020 08:16 AM    Glucose (POC) 85 2016 11:04 AM    Glucose, POC 69 (L) 2016 09:09 AM    LDL, calculated 91 2020 08:16 AM    Creatinine 0.74 2020 08:16 AM      Lab Results   Component Value Date/Time    Cholesterol, total 166 2020 08:16 AM    HDL Cholesterol 45 2020 08:16 AM    LDL, calculated 91 2020 08:16 AM    Triglyceride 150 (H) 2020 08:16 AM    CHOL/HDL Ratio 3.7 2020 08:16 AM        ROS: Feeling generally well. No TIA's or unusual headaches, no dysphagia. No prolonged cough. No dyspnea or chest pain on exertion. No abdominal pain, change in bowel habits, black or bloody stools. No urinary tract symptoms. No new or unusual musculoskeletal symptoms. Specific concerns today: none. .    Objective: The patient appears well, alert, oriented x 3, in no distress.   Visit Vitals  /83 (BP 1 Location: Right arm, BP Patient Position: Sitting, BP Cuff Size: Adult)   Pulse 76   Temp 98.1 °F (36.7 °C) (Temporal)   Resp 16   Ht 5' 3\" (1.6 m)   Wt 305 lb (138.3 kg)   SpO2 93%   BMI 54.03 kg/m²     ENT normal.  Neck supple. No adenopathy or thyromegaly. DEMAR. Lungs are clear, good air entry, no wheezes, rhonchi or rales. S1 and S2 normal, no murmurs, regular rate and rhythm. Abdomen soft without tenderness, guarding, mass or organomegaly. Extremities show no edema, normal peripheral pulses. Neurological is normal, no focal findings. Breast and Pelvic exams are deferred. Assessment/Plan:   Well Woman      ICD-10-CM ICD-9-CM    1. Well woman exam (no gynecological exam)  Z00.00 V70.0    2. Hypercholesterolemia  E78.00 272.0 LIPID PANEL   3. Essential hypertension  P42 460.4 METABOLIC PANEL, COMPREHENSIVE      TSH 3RD GENERATION   4. Vitamin D deficiency  E55.9 268.9 VITAMIN D, 25 HYDROXY   5. Morbid obesity with BMI of 50.0-59.9, adult (HCC)  E66.01 278.01     Z68.43 V85.43    6. Iron deficiency anemia, unspecified iron deficiency anemia type  D50.9 280.9 CBC WITH AUTOMATED DIFF       As above  Labs as ordered  Encourage weight loss efforts as patient plans to do  Follow-up and Dispositions    · Return in about 6 months (around 1/7/2022) for htn, Chol. This has been fully explained to the patient, who indicates understanding.

## 2021-07-07 NOTE — PATIENT INSTRUCTIONS

## 2021-07-07 NOTE — PROGRESS NOTES
Chief Complaint   Patient presents with    Annual Wellness Visit       Pt preferred language for health care discussion is english. Is someone accompanying this pt? no    Is the patient using any DME equipment during OV? no    Depression Screening:  3 most recent [facility] Screens 7/7/2021 7/7/2021 2/20/2020 9/9/2019 1/7/2019 3/20/2018 8/15/2016   Little interest or pleasure in doing things Not at all Not at all Not at all Not at all Not at all Not at all Not at all   Feeling down, depressed, irritable, or hopeless Not at all Not at all Not at all Not at all Not at all Not at all Not at all   Total Score PHQ 2 0 0 0 0 0 0 0       Learning Assessment:  Learning Assessment 7/27/2015 4/1/2014   PRIMARY LEARNER Patient Patient   HIGHEST LEVEL OF EDUCATION - PRIMARY LEARNER  - > [address] [place] LEARNER - [address] CAREGIVER - No   PRIMARY LANGUAGE ENGLISH ENGLISH   LEARNER PREFERENCE PRIMARY DEMONSTRATION DEMONSTRATION   ANSWERED BY patient patient   RELATIONSHIP SELF SELF         Health Maintenance reviewed and discussed per provider. Yes        Advance Directive:  1. Do you have an advance directive in place? Patient Reply:no    2. If not, would you like material regarding how to put one in place? Patient Reply: no    Coordination of Care:  1. Have you been to the ER, urgent care clinic since your last visit? Hospitalized since your last visit? no    2. Have you seen or consulted any other health care providers outside of the [address] [place] [address] since your last visit? Include any pap smears or colon screening.  no

## 2021-07-15 ENCOUNTER — HOSPITAL ENCOUNTER (OUTPATIENT)
Dept: LAB | Age: 56
Discharge: HOME | End: 2021-07-15
Payer: COMMERCIAL

## 2021-07-15 ENCOUNTER — APPOINTMENT (OUTPATIENT)
Dept: FAMILY MEDICINE CLINIC | Age: 56
End: 2021-07-15

## 2021-07-15 DIAGNOSIS — E55.9 VITAMIN D DEFICIENCY: ICD-10-CM

## 2021-07-15 DIAGNOSIS — I10 ESSENTIAL HYPERTENSION: ICD-10-CM

## 2021-07-15 DIAGNOSIS — E78.00 HYPERCHOLESTEROLEMIA: ICD-10-CM

## 2021-07-15 DIAGNOSIS — D50.9 IRON DEFICIENCY ANEMIA, UNSPECIFIED IRON DEFICIENCY ANEMIA TYPE: ICD-10-CM

## 2021-07-15 LAB
25(OH)D3 SERPL-MCNC: 23.9 NG/ML (ref 30–100)
ALBUMIN SERPL-MCNC: 3.4 G/DL (ref 3.4–5)
ALBUMIN/GLOB SERPL: 0.9 {RATIO} (ref 0.8–1.7)
ALP SERPL-CCNC: 82 U/L (ref 45–117)
ALT SERPL-CCNC: 21 U/L (ref 13–56)
ANION GAP SERPL CALC-SCNC: 4 MMOL/L (ref 3–18)
AST SERPL-CCNC: 11 U/L (ref 10–38)
BASOPHILS # BLD: 0 K/UL (ref 0–0.1)
BASOPHILS NFR BLD: 1 % (ref 0–2)
BILIRUB SERPL-MCNC: 0.3 MG/DL (ref 0.2–1)
BUN SERPL-MCNC: 18 MG/DL (ref 7–18)
BUN/CREAT SERPL: 28 (ref 12–20)
CALCIUM SERPL-MCNC: 8.8 MG/DL (ref 8.5–10.1)
CHLORIDE SERPL-SCNC: 106 MMOL/L (ref 100–111)
CHOLEST SERPL-MCNC: 160 MG/DL
CO2 SERPL-SCNC: 30 MMOL/L (ref 21–32)
CREAT SERPL-MCNC: 0.64 MG/DL (ref 0.6–1.3)
DIFFERENTIAL METHOD BLD: ABNORMAL
EOSINOPHIL # BLD: 0.1 K/UL (ref 0–0.4)
EOSINOPHIL NFR BLD: 2 % (ref 0–5)
ERYTHROCYTE [DISTWIDTH] IN BLOOD BY AUTOMATED COUNT: 15.4 % (ref 11.6–14.5)
GLOBULIN SER CALC-MCNC: 3.6 G/DL (ref 2–4)
GLUCOSE SERPL-MCNC: 144 MG/DL (ref 74–99)
HCT VFR BLD AUTO: 38.1 % (ref 35–45)
HDLC SERPL-MCNC: 44 MG/DL (ref 40–60)
HDLC SERPL: 3.6 {RATIO} (ref 0–5)
HGB BLD-MCNC: 11.8 G/DL (ref 12–16)
LDLC SERPL CALC-MCNC: 91.2 MG/DL (ref 0–100)
LIPID PROFILE,FLP: NORMAL
LYMPHOCYTES # BLD: 2.1 K/UL (ref 0.9–3.6)
LYMPHOCYTES NFR BLD: 33 % (ref 21–52)
MCH RBC QN AUTO: 25.8 PG (ref 24–34)
MCHC RBC AUTO-ENTMCNC: 31 G/DL (ref 31–37)
MCV RBC AUTO: 83.2 FL (ref 74–97)
MONOCYTES # BLD: 0.4 K/UL (ref 0.05–1.2)
MONOCYTES NFR BLD: 6 % (ref 3–10)
NEUTS SEG # BLD: 3.8 K/UL (ref 1.8–8)
NEUTS SEG NFR BLD: 59 % (ref 40–73)
PLATELET # BLD AUTO: 291 K/UL (ref 135–420)
PMV BLD AUTO: 11.3 FL (ref 9.2–11.8)
POTASSIUM SERPL-SCNC: 3.8 MMOL/L (ref 3.5–5.5)
PROT SERPL-MCNC: 7 G/DL (ref 6.4–8.2)
RBC # BLD AUTO: 4.58 M/UL (ref 4.2–5.3)
SODIUM SERPL-SCNC: 140 MMOL/L (ref 136–145)
TRIGL SERPL-MCNC: 124 MG/DL (ref ?–150)
TSH SERPL DL<=0.05 MIU/L-ACNC: 1.14 UIU/ML (ref 0.36–3.74)
VLDLC SERPL CALC-MCNC: 24.8 MG/DL
WBC # BLD AUTO: 6.5 K/UL (ref 4.6–13.2)

## 2021-07-15 PROCEDURE — 36415 COLL VENOUS BLD VENIPUNCTURE: CPT

## 2021-07-15 PROCEDURE — 80053 COMPREHEN METABOLIC PANEL: CPT

## 2021-07-15 PROCEDURE — 80061 LIPID PANEL: CPT

## 2021-07-15 PROCEDURE — 82306 VITAMIN D 25 HYDROXY: CPT

## 2021-07-15 PROCEDURE — 84443 ASSAY THYROID STIM HORMONE: CPT

## 2021-07-15 PROCEDURE — 85025 COMPLETE CBC W/AUTO DIFF WBC: CPT

## 2021-07-15 RX ORDER — LANOLIN ALCOHOL/MO/W.PET/CERES
CREAM (GRAM) TOPICAL
Qty: 90 TABLET | Refills: 0 | Status: SHIPPED | OUTPATIENT
Start: 2021-07-15 | End: 2021-10-13

## 2021-07-16 RX ORDER — ERGOCALCIFEROL 1.25 MG/1
50000 CAPSULE ORAL
Qty: 10 CAPSULE | Refills: 0 | Status: SHIPPED | OUTPATIENT
Start: 2021-07-16 | End: 2022-01-10 | Stop reason: ALTCHOICE

## 2021-07-20 ENCOUNTER — TELEPHONE (OUTPATIENT)
Dept: FAMILY MEDICINE CLINIC | Age: 56
End: 2021-07-20

## 2021-07-20 NOTE — TELEPHONE ENCOUNTER
Called and talked to pt about her A! C needing to be collected. States she will come in tomorrow to get lab. Plan to start meds for diabetes should A1c continue to be elevated. Pt voiced understanding. Risks reviewed.

## 2021-07-23 ENCOUNTER — HOSPITAL ENCOUNTER (OUTPATIENT)
Dept: LAB | Age: 56
Discharge: HOME | End: 2021-07-23
Payer: COMMERCIAL

## 2021-07-23 ENCOUNTER — APPOINTMENT (OUTPATIENT)
Dept: FAMILY MEDICINE CLINIC | Age: 56
End: 2021-07-23

## 2021-07-23 DIAGNOSIS — R73.9 ELEVATED BLOOD SUGAR: ICD-10-CM

## 2021-07-23 LAB
EST. AVERAGE GLUCOSE BLD GHB EST-MCNC: 146 MG/DL
HBA1C MFR BLD: 6.7 % (ref 4.2–5.6)

## 2021-07-23 PROCEDURE — 36415 COLL VENOUS BLD VENIPUNCTURE: CPT

## 2021-07-23 PROCEDURE — 83036 HEMOGLOBIN GLYCOSYLATED A1C: CPT

## 2021-07-30 RX ORDER — SIMVASTATIN 10 MG/1
TABLET, FILM COATED ORAL
Qty: 90 TABLET | Refills: 0 | Status: SHIPPED | OUTPATIENT
Start: 2021-07-30 | End: 2021-10-31

## 2021-08-06 RX ORDER — OMEPRAZOLE 40 MG/1
CAPSULE, DELAYED RELEASE ORAL
Qty: 90 CAPSULE | Refills: 0 | Status: SHIPPED | OUTPATIENT
Start: 2021-08-06 | End: 2021-11-09 | Stop reason: SDUPTHER

## 2021-09-11 DIAGNOSIS — M25.551 RIGHT HIP PAIN: ICD-10-CM

## 2021-09-11 DIAGNOSIS — M17.0 PRIMARY OSTEOARTHRITIS OF KNEES, BILATERAL: ICD-10-CM

## 2021-09-13 RX ORDER — IBUPROFEN 800 MG/1
800 TABLET ORAL
Qty: 90 TABLET | Refills: 2 | Status: SHIPPED | OUTPATIENT
Start: 2021-09-13 | End: 2021-12-07 | Stop reason: SDUPTHER

## 2021-10-13 RX ORDER — LANOLIN ALCOHOL/MO/W.PET/CERES
CREAM (GRAM) TOPICAL
Qty: 90 TABLET | Refills: 0 | Status: SHIPPED | OUTPATIENT
Start: 2021-10-13 | End: 2022-01-10 | Stop reason: SDUPTHER

## 2021-11-09 NOTE — TELEPHONE ENCOUNTER
Last Visit: 7/7/21 with MD Thacker Median  Next Appointment: 1/10/22 with MD Thacker Median  Previous Refill Encounter(s): 8/6/21 #90    Requested Prescriptions     Pending Prescriptions Disp Refills    omeprazole (PRILOSEC) 40 mg capsule 90 Capsule 1     Sig: Take 1 Capsule by mouth daily.  Indications: GERD

## 2021-11-11 RX ORDER — OMEPRAZOLE 40 MG/1
CAPSULE, DELAYED RELEASE ORAL
Qty: 90 CAPSULE | Refills: 0 | Status: CANCELLED | OUTPATIENT
Start: 2021-11-11

## 2021-11-13 RX ORDER — OMEPRAZOLE 40 MG/1
40 CAPSULE, DELAYED RELEASE ORAL DAILY
Qty: 90 CAPSULE | Refills: 1 | Status: SHIPPED | OUTPATIENT
Start: 2021-11-13 | End: 2022-05-13

## 2021-12-07 DIAGNOSIS — M17.0 PRIMARY OSTEOARTHRITIS OF KNEES, BILATERAL: ICD-10-CM

## 2021-12-07 DIAGNOSIS — M25.551 RIGHT HIP PAIN: ICD-10-CM

## 2021-12-08 RX ORDER — IBUPROFEN 800 MG/1
800 TABLET ORAL
Qty: 90 TABLET | Refills: 2 | Status: SHIPPED | OUTPATIENT
Start: 2021-12-08

## 2022-01-10 ENCOUNTER — OFFICE VISIT (OUTPATIENT)
Dept: FAMILY MEDICINE CLINIC | Age: 57
End: 2022-01-10
Payer: COMMERCIAL

## 2022-01-10 VITALS
TEMPERATURE: 96.8 F | DIASTOLIC BLOOD PRESSURE: 77 MMHG | OXYGEN SATURATION: 97 % | WEIGHT: 293 LBS | RESPIRATION RATE: 16 BRPM | HEART RATE: 71 BPM | HEIGHT: 63 IN | BODY MASS INDEX: 51.91 KG/M2 | SYSTOLIC BLOOD PRESSURE: 125 MMHG

## 2022-01-10 DIAGNOSIS — E66.01 MORBID OBESITY WITH BMI OF 50.0-59.9, ADULT (HCC): ICD-10-CM

## 2022-01-10 DIAGNOSIS — E55.9 VITAMIN D DEFICIENCY: ICD-10-CM

## 2022-01-10 DIAGNOSIS — E11.65 CONTROLLED TYPE 2 DIABETES MELLITUS WITH HYPERGLYCEMIA, WITHOUT LONG-TERM CURRENT USE OF INSULIN (HCC): ICD-10-CM

## 2022-01-10 DIAGNOSIS — E78.00 HYPERCHOLESTEROLEMIA: ICD-10-CM

## 2022-01-10 DIAGNOSIS — I10 ESSENTIAL HYPERTENSION: Primary | ICD-10-CM

## 2022-01-10 PROCEDURE — 99214 OFFICE O/P EST MOD 30 MIN: CPT | Performed by: FAMILY MEDICINE

## 2022-01-10 RX ORDER — LANOLIN ALCOHOL/MO/W.PET/CERES
325 CREAM (GRAM) TOPICAL DAILY
Qty: 90 TABLET | Refills: 3 | Status: SHIPPED | OUTPATIENT
Start: 2022-01-10

## 2022-01-10 RX ORDER — PHENTERMINE HYDROCHLORIDE 37.5 MG/1
37.5 TABLET ORAL
Qty: 30 TABLET | Refills: 2 | Status: SHIPPED | OUTPATIENT
Start: 2022-01-10 | End: 2022-03-15 | Stop reason: SDUPTHER

## 2022-01-10 NOTE — PATIENT INSTRUCTIONS
DASH Diet: Care Instructions  Your Care Instructions     The DASH diet is an eating plan that can help lower your blood pressure. DASH stands for Dietary Approaches to Stop Hypertension. Hypertension is high blood pressure. The DASH diet focuses on eating foods that are high in calcium, potassium, and magnesium. These nutrients can lower blood pressure. The foods that are highest in these nutrients are fruits, vegetables, low-fat dairy products, nuts, seeds, and legumes. But taking calcium, potassium, and magnesium supplements instead of eating foods that are high in those nutrients does not have the same effect. The DASH diet also includes whole grains, fish, and poultry. The DASH diet is one of several lifestyle changes your doctor may recommend to lower your high blood pressure. Your doctor may also want you to decrease the amount of sodium in your diet. Lowering sodium while following the DASH diet can lower blood pressure even further than just the DASH diet alone. Follow-up care is a key part of your treatment and safety. Be sure to make and go to all appointments, and call your doctor if you are having problems. It's also a good idea to know your test results and keep a list of the medicines you take. How can you care for yourself at home? Following the DASH diet  · Eat 4 to 5 servings of fruit each day. A serving is 1 medium-sized piece of fruit, ½ cup chopped or canned fruit, 1/4 cup dried fruit, or 4 ounces (½ cup) of fruit juice. Choose fruit more often than fruit juice. · Eat 4 to 5 servings of vegetables each day. A serving is 1 cup of lettuce or raw leafy vegetables, ½ cup of chopped or cooked vegetables, or 4 ounces (½ cup) of vegetable juice. Choose vegetables more often than vegetable juice. · Get 2 to 3 servings of low-fat and fat-free dairy each day. A serving is 8 ounces of milk, 1 cup of yogurt, or 1 ½ ounces of cheese. · Eat 6 to 8 servings of grains each day.  A serving is 1 slice of bread, 1 ounce of dry cereal, or ½ cup of cooked rice, pasta, or cooked cereal. Try to choose whole-grain products as much as possible. · Limit lean meat, poultry, and fish to 2 servings each day. A serving is 3 ounces, about the size of a deck of cards. · Eat 4 to 5 servings of nuts, seeds, and legumes (cooked dried beans, lentils, and split peas) each week. A serving is 1/3 cup of nuts, 2 tablespoons of seeds, or ½ cup of cooked beans or peas. · Limit fats and oils to 2 to 3 servings each day. A serving is 1 teaspoon of vegetable oil or 2 tablespoons of salad dressing. · Limit sweets and added sugars to 5 servings or less a week. A serving is 1 tablespoon jelly or jam, ½ cup sorbet, or 1 cup of lemonade. · Eat less than 2,300 milligrams (mg) of sodium a day. If you limit your sodium to 1,500 mg a day, you can lower your blood pressure even more. · Be aware that all of these are the suggested number of servings for people who eat 1,800 to 2,000 calories a day. Your recommended number of servings may be different if you need more or fewer calories. Tips for success  · Start small. Do not try to make dramatic changes to your diet all at once. You might feel that you are missing out on your favorite foods and then be more likely to not follow the plan. Make small changes, and stick with them. Once those changes become habit, add a few more changes. · Try some of the following:  ? Make it a goal to eat a fruit or vegetable at every meal and at snacks. This will make it easy to get the recommended amount of fruits and vegetables each day. ? Try yogurt topped with fruit and nuts for a snack or healthy dessert. ? Add lettuce, tomato, cucumber, and onion to sandwiches. ? Combine a ready-made pizza crust with low-fat mozzarella cheese and lots of vegetable toppings. Try using tomatoes, squash, spinach, broccoli, carrots, cauliflower, and onions. ?  Have a variety of cut-up vegetables with a low-fat dip as an appetizer instead of chips and dip. ? Sprinkle sunflower seeds or chopped almonds over salads. Or try adding chopped walnuts or almonds to cooked vegetables. ? Try some vegetarian meals using beans and peas. Add garbanzo or kidney beans to salads. Make burritos and tacos with mashed raygoza beans or black beans. Where can you learn more? Go to http://www.holm.com/  Enter H967 in the search box to learn more about \"DASH Diet: Care Instructions. \"  Current as of: April 29, 2021               Content Version: 13.0  © 3019-0983 [employer]. Care instructions adapted under license by [employer] (which disclaims liability or warranty for this information). If you have questions about a medical condition or this instruction, always ask your healthcare professional. [address] any warranty or liability for your use of this information.

## 2022-01-10 NOTE — PROGRESS NOTES
1. \"Have you been to the ER, urgent care clinic since your last visit? Hospitalized since your last visit? \" No    2. \"Have you seen or consulted any other health care providers outside of the [address] [place] [address] since your last visit? \" Yes GYN with Dr. White      3. For patients aged 39-70: Has the patient had a colonoscopy / FIT/ Cologuard? Yes, HM satisfied with blue hyperlink     If the patient is female:    4. For patients aged 41-77: Has the patient had a mammogram within the past 2 years? Yes, HM satisfied with blue hyperlink    5. For patients aged 21-65: Has the patient had a pap smear?  No

## 2022-01-10 NOTE — PROGRESS NOTES
HPI:  Sd Keith is a 64 y.o. female who presents today with   Chief Complaint   Patient presents with    Cholesterol Problem     6 m f/u     Hypertension       HTN:  Pt has been well; no new complaints. Pt is on norvasc, and hyzaar. Takes med daily. She has lost weight. She is not exercising as much recently, though she did walk regularly during holidays. Cholesterol:  She is on simvastatin. Takes med daily. She tolerates med well. She attempts a low-cholesterol diet    Lab Results   Component Value Date/Time    Cholesterol, total 160 07/15/2021 09:29 AM    HDL Cholesterol 44 07/15/2021 09:29 AM    LDL, calculated 91.2 07/15/2021 09:29 AM    VLDL, calculated 24.8 07/15/2021 09:29 AM    Triglyceride 124 07/15/2021 09:29 AM    CHOL/HDL Ratio 3.6 07/15/2021 09:29 AM       Lab Results   Component Value Date/Time    Sodium 140 07/15/2021 09:29 AM    Potassium 3.8 07/15/2021 09:29 AM    Chloride 106 07/15/2021 09:29 AM    CO2 30 07/15/2021 09:29 AM    Anion gap 4 07/15/2021 09:29 AM    Glucose 144 (H) 07/15/2021 09:29 AM    BUN 18 07/15/2021 09:29 AM    Creatinine 0.64 07/15/2021 09:29 AM    BUN/Creatinine ratio 28 (H) 07/15/2021 09:29 AM    GFR est AA >60 07/15/2021 09:29 AM    GFR est non-AA >60 07/15/2021 09:29 AM    Calcium 8.8 07/15/2021 09:29 AM    Bilirubin, total 0.3 07/15/2021 09:29 AM    Alk. phosphatase 82 07/15/2021 09:29 AM    Protein, total 7.0 07/15/2021 09:29 AM    Albumin 3.4 07/15/2021 09:29 AM    Globulin 3.6 07/15/2021 09:29 AM    A-G Ratio 0.9 07/15/2021 09:29 AM    ALT (SGPT) 21 07/15/2021 09:29 AM    AST (SGOT) 11 07/15/2021 09:29 AM         Has had an elevated A1c  -   A1c is consistent with Diabetes; She wants to lose weight. Lab Results   Component Value Date/Time    Hemoglobin A1c 6.7 (H) 07/23/2021 01:54 PM         She is under the care of GYN for ASCUS; It is anticipated that she is having a hysterectomy.        3 most recent [facility] Screens 1/10/2022   PHQ Not Done Patient refuses   Little interest or pleasure in doing things Not at all   Feeling down, depressed, irritable, or hopeless Not at all   Total Score PHQ 2 0               PMH,  Meds, Allergies, Family History, Social history reviewed      Current Outpatient Medications   Medication Sig Dispense Refill    ferrous sulfate 325 mg (65 mg iron) tablet Take 1 Tablet by mouth daily. 90 Tablet 3    phentermine (ADIPEX-P) 37.5 mg tablet Take 1 Tablet by mouth every morning. Max Daily Amount: 37.5 mg. 30 Tablet 2    ibuprofen (MOTRIN) 800 mg tablet Take 1 Tablet by mouth every eight (8) hours as needed for Pain. 90 Tablet 2    amLODIPine (NORVASC) 5 mg tablet Take 1 tablet by mouth once daily 90 Tablet 1    omeprazole (PRILOSEC) 40 mg capsule Take 1 Capsule by mouth daily.  Indications: GERD 90 Capsule 1    simvastatin (ZOCOR) 10 mg tablet Take 1 tablet by mouth nightly 90 Tablet 3    losartan-hydroCHLOROthiazide (HYZAAR) 100-12.5 mg per tablet Take 1 tablet by mouth once daily 90 Tablet 3        Allergies   Allergen Reactions    Lisinopril Cough                  ROS as per HPI        Visit Vitals  /77 (BP 1 Location: Right arm, BP Patient Position: Sitting, BP Cuff Size: Large adult long)   Pulse 71   Temp 96.8 °F (36 °C) (Temporal)   Resp 16   Ht 5' 3\" (1.6 m)   Wt 303 lb 3.2 oz (137.5 kg)   SpO2 97%   BMI 53.71 kg/m²     Physical Exam     General appearance: alert, cooperative, no distress, appears stated age  Neck: supple, symmetrical, trachea midline, no adenopathy, thyroid: not enlarged, symmetric, no tenderness/mass/nodules, no carotid bruit and no JVD  Lungs: clear to auscultation bilaterally  Heart: regular rate and rhythm, S1, S2 normal, no murmur, click, rub or gallop    Extremities: extremities normal, atraumatic, no cyanosis or edema    Lab Results   Component Value Date/Time    Sodium 140 07/15/2021 09:29 AM    Potassium 3.8 07/15/2021 09:29 AM    Chloride 106 07/15/2021 09:29 AM    CO2 30 07/15/2021 09:29 AM    Anion gap 4 07/15/2021 09:29 AM    Glucose 144 (H) 07/15/2021 09:29 AM    BUN 18 07/15/2021 09:29 AM    Creatinine 0.64 07/15/2021 09:29 AM    BUN/Creatinine ratio 28 (H) 07/15/2021 09:29 AM    GFR est AA >60 07/15/2021 09:29 AM    GFR est non-AA >60 07/15/2021 09:29 AM    Calcium 8.8 07/15/2021 09:29 AM    Bilirubin, total 0.3 07/15/2021 09:29 AM    Alk. phosphatase 82 07/15/2021 09:29 AM    Protein, total 7.0 07/15/2021 09:29 AM    Albumin 3.4 07/15/2021 09:29 AM    Globulin 3.6 07/15/2021 09:29 AM    A-G Ratio 0.9 07/15/2021 09:29 AM    ALT (SGPT) 21 07/15/2021 09:29 AM    AST (SGOT) 11 07/15/2021 09:29 AM     Lab Results   Component Value Date/Time    Hemoglobin A1c 6.7 (H) 07/23/2021 01:54 PM         Assessment/Plan:  Diagnoses and all orders for this visit:    1. Essential hypertension    2. Hypercholesterolemia  -     LIPID PANEL; Future  -     METABOLIC PANEL, COMPREHENSIVE; Future    3. Controlled type 2 diabetes mellitus with hyperglycemia, without long-term current use of insulin ([facility])  -     HEMOGLOBIN A1C WITH EAG; Future  -     MICROALBUMIN, UR, RAND W/ MICROALB/CREAT RATIO; Future    4. Vitamin D deficiency  -     VITAMIN D, 25 HYDROXY; Future    5. Morbid obesity with BMI of 50.0-59.9, adult (HCC)  -     phentermine (ADIPEX-P) 37.5 mg tablet; Take 1 Tablet by mouth every morning. Max Daily Amount: 37.5 mg. Other orders  -     ferrous sulfate 325 mg (65 mg iron) tablet; Take 1 Tablet by mouth daily. as above,   Pt stable   treatment plan as listed below   Orders Placed This Encounter    LIPID PANEL    METABOLIC PANEL, COMPREHENSIVE    HEMOGLOBIN A1C WITH EAG    MICROALBUMIN, UR, RAND W/ MICROALB/CREAT RATIO    VITAMIN D, 25 HYDROXY    ferrous sulfate 325 mg (65 mg iron) tablet    phentermine (ADIPEX-P) 37.5 mg tablet     As continued as ordered.   Proper use and side effects reviewed with patient  Labs as ordered  Follow-up with gynecology as scheduled  Follow-up and Dispositions    · Return in about 3 months (around 4/10/2022) for htn, weight management. This has been fully explained to the patient, who indicates understanding. An After Visit Summary was printed and given to the patient.                Melonie Thomas MD

## 2022-03-15 DIAGNOSIS — E66.01 MORBID OBESITY WITH BMI OF 50.0-59.9, ADULT (HCC): ICD-10-CM

## 2022-03-17 NOTE — TELEPHONE ENCOUNTER
Last Visit: 1/10/22 with MD Jorge Haider  Next Appointment: 4/11/22 with MD Jorge Haider  Previous Refill Encounter(s): 1/10/22 #30 with 2 refills    Requested Prescriptions     Pending Prescriptions Disp Refills    phentermine (ADIPEX-P) 37.5 mg tablet 30 Tablet 2     Sig: Take 1 Tablet by mouth every morning.  Max Daily Amount: 37.5 mg.

## 2022-03-18 PROBLEM — E66.01 OBESITY, MORBID (HCC): Status: ACTIVE | Noted: 2018-03-20

## 2022-03-18 RX ORDER — PHENTERMINE HYDROCHLORIDE 37.5 MG/1
37.5 TABLET ORAL
Qty: 30 TABLET | Refills: 2 | Status: SHIPPED | OUTPATIENT
Start: 2022-03-18 | End: 2022-07-01

## 2022-03-25 ENCOUNTER — LAB ONLY (OUTPATIENT)
Dept: FAMILY MEDICINE CLINIC | Age: 57
End: 2022-03-25

## 2022-03-25 DIAGNOSIS — E11.65 CONTROLLED TYPE 2 DIABETES MELLITUS WITH HYPERGLYCEMIA, WITHOUT LONG-TERM CURRENT USE OF INSULIN (HCC): ICD-10-CM

## 2022-03-25 DIAGNOSIS — E55.9 VITAMIN D DEFICIENCY: ICD-10-CM

## 2022-03-25 DIAGNOSIS — E78.00 HYPERCHOLESTEROLEMIA: ICD-10-CM

## 2022-03-26 LAB
25(OH)D3 SERPL-MCNC: 41 NG/ML (ref 32–100)
A-G RATIO,AGRAT: 1.3 RATIO (ref 1.1–2.6)
ALBUMIN SERPL-MCNC: 3.8 G/DL (ref 3.5–5)
ALP SERPL-CCNC: 83 U/L (ref 25–115)
ALT SERPL-CCNC: 15 U/L (ref 5–40)
ANION GAP SERPL CALC-SCNC: 10 MMOL/L (ref 3–15)
AST SERPL W P-5'-P-CCNC: 9 U/L (ref 10–37)
AVG GLU, 10930: 147 MG/DL (ref 91–123)
BILIRUB SERPL-MCNC: 0.2 MG/DL (ref 0.2–1.2)
BUN SERPL-MCNC: 14 MG/DL (ref 6–22)
CALCIUM SERPL-MCNC: 9.6 MG/DL (ref 8.4–10.5)
CHLORIDE SERPL-SCNC: 104 MMOL/L (ref 98–110)
CHOLEST SERPL-MCNC: 169 MG/DL (ref 110–200)
CO2 SERPL-SCNC: 28 MMOL/L (ref 20–32)
CREAT SERPL-MCNC: 0.6 MG/DL (ref 0.5–1.2)
CREATININE, URINE: 113 MG/DL
GFRAA, 66117: >60
GFRNA, 66118: >60
GLOBULIN,GLOB: 3 G/DL (ref 2–4)
GLUCOSE SERPL-MCNC: 134 MG/DL (ref 70–99)
HBA1C MFR BLD HPLC: 6.8 % (ref 4.8–5.6)
HDLC SERPL-MCNC: 4 MG/DL (ref 0–5)
HDLC SERPL-MCNC: 42 MG/DL
LDL/HDL RATIO,LDHD: 2.4
LDLC SERPL CALC-MCNC: 102 MG/DL (ref 50–99)
MICROALB/CREAT RATIO, 140286: 23.9 (ref 0–30)
MICROALBUMIN,URINE RANDOM 140054: 27 MG/L (ref 0.1–17)
NON-HDL CHOLESTEROL, 011976: 127 MG/DL
POTASSIUM SERPL-SCNC: 4.6 MMOL/L (ref 3.5–5.5)
PROT SERPL-MCNC: 6.8 G/DL (ref 6.4–8.3)
SODIUM SERPL-SCNC: 142 MMOL/L (ref 133–145)
TRIGL SERPL-MCNC: 129 MG/DL (ref 40–149)
VLDLC SERPL CALC-MCNC: 26 MG/DL (ref 8–30)

## 2022-03-28 ENCOUNTER — TELEPHONE (OUTPATIENT)
Dept: FAMILY MEDICINE CLINIC | Age: 57
End: 2022-03-28

## 2022-03-28 NOTE — TELEPHONE ENCOUNTER
----- Message from Leslie Patiño sent at 3/24/2022 10:18 AM EDT -----  Subject: Message to Provider    QUESTIONS  Information for Provider? PT would like to know if she needs to schedule   her labs or if she can walk in. Tried to reach the office, but was not   able to reach anyone. PT is requesting a call back in regard to labs.   ---------------------------------------------------------------------------  --------------  CALL BACK INFO  What is the best way for the office to contact you? OK to leave message on   voicemail  Preferred Call Back Phone Number? [phone]  ---------------------------------------------------------------------------  --------------  SCRIPT ANSWERS  Relationship to Patient?  Self

## 2022-04-11 ENCOUNTER — OFFICE VISIT (OUTPATIENT)
Dept: FAMILY MEDICINE CLINIC | Age: 57
End: 2022-04-11
Payer: COMMERCIAL

## 2022-04-11 VITALS
HEART RATE: 83 BPM | OXYGEN SATURATION: 96 % | TEMPERATURE: 97.3 F | RESPIRATION RATE: 18 BRPM | BODY MASS INDEX: 51.91 KG/M2 | DIASTOLIC BLOOD PRESSURE: 83 MMHG | SYSTOLIC BLOOD PRESSURE: 123 MMHG | WEIGHT: 293 LBS | HEIGHT: 63 IN

## 2022-04-11 DIAGNOSIS — Z87.440 HISTORY OF UTI: ICD-10-CM

## 2022-04-11 DIAGNOSIS — E66.01 MORBID OBESITY WITH BMI OF 50.0-59.9, ADULT (HCC): ICD-10-CM

## 2022-04-11 DIAGNOSIS — I10 ESSENTIAL HYPERTENSION: Primary | ICD-10-CM

## 2022-04-11 PROCEDURE — 99214 OFFICE O/P EST MOD 30 MIN: CPT | Performed by: FAMILY MEDICINE

## 2022-04-11 NOTE — PATIENT INSTRUCTIONS
Body Mass Index: Care Instructions  Your Care Instructions     Body mass index (BMI) can help you see if your weight is raising your risk for health problems. It uses a formula to compare how much you weigh with how tall you are. · A BMI lower than 18.5 is considered underweight. · A BMI between 18.5 and 24.9 is considered healthy. · A BMI between 25 and 29.9 is considered overweight. A BMI of 30 or higher is considered obese. If your BMI is in the normal range, it means that you have a lower risk for weight-related health problems. If your BMI is in the overweight or obese range, you may be at increased risk for weight-related health problems, such as high blood pressure, heart disease, stroke, arthritis or joint pain, and diabetes. If your BMI is in the underweight range, you may be at increased risk for health problems such as fatigue, lower protection (immunity) against illness, muscle loss, bone loss, hair loss, and hormone problems. BMI is just one measure of your risk for weight-related health problems. You may be at higher risk for health problems if you are not active, you eat an unhealthy diet, or you drink too much alcohol or use tobacco products. Follow-up care is a key part of your treatment and safety. Be sure to make and go to all appointments, and call your doctor if you are having problems. It's also a good idea to know your test results and keep a list of the medicines you take. How can you care for yourself at home? · Practice healthy eating habits. This includes eating plenty of fruits, vegetables, whole grains, lean protein, and low-fat dairy. · If your doctor recommends it, get more exercise. Walking is a good choice. Bit by bit, increase the amount you walk every day. Try for at least 30 minutes on most days of the week. · Do not smoke. Smoking can increase your risk for health problems. If you need help quitting, talk to your doctor about stop-smoking programs and medicines. These can increase your chances of quitting for good. · Limit alcohol to 2 drinks a day for men and 1 drink a day for women. Too much alcohol can cause health problems. If you have a BMI higher than 25  · Your doctor may do other tests to check your risk for weight-related health problems. This may include measuring the distance around your waist. A waist measurement of more than 40 inches in men or 35 inches in women can increase the risk of weight-related health problems. · Talk with your doctor about steps you can take to stay healthy or improve your health. You may need to make lifestyle changes to lose weight and stay healthy, such as changing your diet and getting regular exercise. If you have a BMI lower than 18.5  · Your doctor may do other tests to check your risk for health problems. · Talk with your doctor about steps you can take to stay healthy or improve your health. You may need to make lifestyle changes to gain or maintain weight and stay healthy, such as getting more healthy foods in your diet and doing exercises to build muscle. Where can you learn more? Go to http://www.holm.com/  Enter S176 in the search box to learn more about \"Body Mass Index: Care Instructions. \"  Current as of: December 27, 2021               Content Version: 13.2  © 2006-2022 [employer]. Care instructions adapted under license by [employer] (which disclaims liability or warranty for this information). If you have questions about a medical condition or this instruction, always ask your healthcare professional. [place] [address] any warranty or liability for your use of this information.

## 2022-04-11 NOTE — PROGRESS NOTES
HPI:  Katherin Shine is a 62 y.o. female who presents today with   Chief Complaint   Patient presents with    Hypertension    Weight Loss        Started phentermine;  Has helped with appetite suppression. She has lost  5 pounds since her last visit. She is not exercising. Advised to do so; She reports a different sensation at the bladder; She is scheduled to have a hysterectomy in am. Sees Dr. Jim Rose. She had a UTI treated in Feb at [facility]. She was unsure if it was cleared. Advised that it is unlikely that she has had a continuing UTI since Feb. Advised that a urine culture can be checked. Has HTN:  On norvasc and Hyzaar; BP is stable today    3 most recent PHQ Screens 4/11/2022   PHQ Not Done -   Little interest or pleasure in doing things Not at all   Feeling down, depressed, irritable, or hopeless Not at all   Total Score PHQ 2 0               PMH,  Meds, Allergies, Family History, Social history reviewed      Current Outpatient Medications   Medication Sig Dispense Refill    phentermine (ADIPEX-P) 37.5 mg tablet Take 1 Tablet by mouth every morning. Max Daily Amount: 37.5 mg. 30 Tablet 2    ferrous sulfate 325 mg (65 mg iron) tablet Take 1 Tablet by mouth daily. 90 Tablet 3    ibuprofen (MOTRIN) 800 mg tablet Take 1 Tablet by mouth every eight (8) hours as needed for Pain. 90 Tablet 2    amLODIPine (NORVASC) 5 mg tablet Take 1 tablet by mouth once daily 90 Tablet 1    omeprazole (PRILOSEC) 40 mg capsule Take 1 Capsule by mouth daily. Indications: GERD 90 Capsule 1    simvastatin (ZOCOR) 10 mg tablet Take 1 tablet by mouth nightly 90 Tablet 3    losartan-hydroCHLOROthiazide (HYZAAR) 100-12.5 mg per tablet Take 1 tablet by mouth once daily 90 Tablet 3    cholecalciferol (VITAMIN D3) (5000 Units/125 mcg) tab tablet Take 5,000 Units by mouth daily.           Allergies   Allergen Reactions    Lisinopril Cough                  ROS as per HPI    Lab Results   Component Value Date/Time Cholesterol, total 169 03/25/2022 09:38 AM    HDL Cholesterol 42 03/25/2022 09:38 AM    LDL, calculated 102 (H) 03/25/2022 09:38 AM    VLDL, calculated 26 03/25/2022 09:38 AM    Triglyceride 129 03/25/2022 09:38 AM    CHOL/HDL Ratio 3.6 07/15/2021 09:29 AM     Lab Results   Component Value Date/Time    Sodium 141 04/05/2022 01:20 PM    Potassium 3.7 04/05/2022 01:20 PM    Chloride 106 04/05/2022 01:20 PM    CO2 28 04/05/2022 01:20 PM    Anion gap 7 04/05/2022 01:20 PM    Glucose 80 04/05/2022 01:20 PM    BUN 18 04/05/2022 01:20 PM    Creatinine 0.6 04/05/2022 01:20 PM    BUN/Creatinine ratio 28 (H) 07/15/2021 09:29 AM    GFR est AA >60.0 04/05/2022 01:20 PM    GFR est non-AA >60 04/05/2022 01:20 PM    Calcium 9.6 04/05/2022 01:20 PM    Bilirubin, total 0.2 03/25/2022 09:38 AM    Alk. phosphatase 83 03/25/2022 09:38 AM    Protein, total 6.8 03/25/2022 09:38 AM    Albumin 3.8 03/25/2022 09:38 AM    Globulin 3.0 03/25/2022 09:38 AM    A-G Ratio 1.3 03/25/2022 09:38 AM    ALT (SGPT) 15 03/25/2022 09:38 AM    AST (SGOT) 9 (L) 03/25/2022 09:38 AM           Visit Vitals  /83 (BP 1 Location: Right upper arm, BP Patient Position: Sitting)   Pulse 83   Temp 97.3 °F (36.3 °C) (Temporal)   Resp 18   Ht 5' 3\" (1.6 m)   Wt 298 lb (135.2 kg)   LMP 09/22/2018   SpO2 96%   BMI 52.79 kg/m²     Physical Exam   General appearance: alert, cooperative, no distress, appears stated age  Neck: supple, symmetrical, trachea midline, no adenopathy, thyroid: not enlarged, symmetric, no tenderness/mass/nodules, no carotid bruit and no JVD  Lungs: clear to auscultation bilaterally  Heart: regular rate and rhythm, S1, S2 normal, no murmur, click, rub or gallop  Extremities: extremities normal, atraumatic, no cyanosis or edema      Assessment/Plan:    Diagnoses and all orders for this visit:    1. Essential hypertension    2. Morbid obesity with BMI of 50.0-59.9, adult ([facility] [address]    3. History of UTI  -     CULTURE, URINE;  Future    Other orders  -     CULTURE, URINE      As above  Congratulated on weight loss efforts. Continue current management, BP meds  Follow up with consultants as recommended. Follow-up and Dispositions    · Return in about 4 months (around 8/11/2022) for diabetes, htn. This has been fully explained to the patient, who indicates understanding. An After Visit Summary was printed and given to the patient. Follow-up and Dispositions    · Return in about 4 months (around 8/11/2022) for diabetes, htn.             Yogesh Mccarthy MD

## 2022-04-11 NOTE — PROGRESS NOTES
1. \"Have you been to the ER, urgent care clinic since your last visit? Hospitalized since your last visit? \" No    2. \"Have you seen or consulted any other health care providers outside of the [address] [place] [address] since your last visit? \" No     3. For patients aged 39-70: Has the patient had a colonoscopy / FIT/ Cologuard? Yes - no Care Gap present      If the patient is female:    4. For patients aged 41-77: Has the patient had a mammogram within the past 2 years? Yes - no Care Gap present      5. For patients aged 21-65: Has the patient had a pap smear?  Yes - no Care Gap present

## 2022-04-13 LAB — RESULT: NORMAL

## 2022-05-29 DIAGNOSIS — I10 ESSENTIAL HYPERTENSION: ICD-10-CM

## 2022-05-31 RX ORDER — LOSARTAN POTASSIUM AND HYDROCHLOROTHIAZIDE 12.5; 1 MG/1; MG/1
1 TABLET ORAL DAILY
Qty: 90 TABLET | Refills: 3 | OUTPATIENT
Start: 2022-05-31

## 2022-06-02 RX ORDER — LOSARTAN POTASSIUM AND HYDROCHLOROTHIAZIDE 12.5; 1 MG/1; MG/1
TABLET ORAL
Qty: 90 TABLET | Refills: 3 | Status: SHIPPED | OUTPATIENT
Start: 2022-06-02

## 2022-06-24 DIAGNOSIS — E66.01 MORBID OBESITY WITH BMI OF 50.0-59.9, ADULT (HCC): ICD-10-CM

## 2022-07-01 RX ORDER — PHENTERMINE HYDROCHLORIDE 37.5 MG/1
CAPSULE ORAL
Qty: 30 CAPSULE | Refills: 0 | Status: SHIPPED | OUTPATIENT
Start: 2022-07-01 | End: 2022-08-05

## 2022-07-18 ENCOUNTER — OFFICE VISIT (OUTPATIENT)
Dept: ORTHOPEDIC SURGERY | Age: 57
End: 2022-07-18
Payer: COMMERCIAL

## 2022-07-18 VITALS — WEIGHT: 289 LBS | HEIGHT: 63 IN | TEMPERATURE: 97.8 F | BODY MASS INDEX: 51.21 KG/M2

## 2022-07-18 DIAGNOSIS — G89.29 CHRONIC PAIN OF LEFT KNEE: Primary | ICD-10-CM

## 2022-07-18 DIAGNOSIS — M25.562 CHRONIC PAIN OF LEFT KNEE: Primary | ICD-10-CM

## 2022-07-18 DIAGNOSIS — M17.32 POST-TRAUMATIC OSTEOARTHRITIS OF ONE KNEE, LEFT: ICD-10-CM

## 2022-07-18 PROCEDURE — 20610 DRAIN/INJ JOINT/BURSA W/O US: CPT | Performed by: SPECIALIST

## 2022-07-18 PROCEDURE — 73562 X-RAY EXAM OF KNEE 3: CPT | Performed by: SPECIALIST

## 2022-07-18 PROCEDURE — 99203 OFFICE O/P NEW LOW 30 MIN: CPT | Performed by: SPECIALIST

## 2022-07-18 RX ORDER — BETAMETHASONE SODIUM PHOSPHATE AND BETAMETHASONE ACETATE 3; 3 MG/ML; MG/ML
3 INJECTION, SUSPENSION INTRA-ARTICULAR; INTRALESIONAL; INTRAMUSCULAR; SOFT TISSUE ONCE
Status: COMPLETED | OUTPATIENT
Start: 2022-07-18 | End: 2022-07-18

## 2022-07-18 RX ADMIN — BETAMETHASONE SODIUM PHOSPHATE AND BETAMETHASONE ACETATE 3 MG: 3; 3 INJECTION, SUSPENSION INTRA-ARTICULAR; INTRALESIONAL; INTRAMUSCULAR; SOFT TISSUE at 10:34

## 2022-07-18 NOTE — PROGRESS NOTES
[]            Patient: Brady Patel                MRN: [id number]       SSN: [id number]  YOB: 1965        AGE: 48 y.o. SEX: female        PCP: Berna Tejada MD     7/18/22  [address]:  LEFT KNEE PAIN    HISTORY:  Brady Patel is a 48 y.o. female who is seen for left knee pain. She does not recall any recent injury. She sustained an old injury when she stepped off a curb into a pile of snow that they had pushed next to the walkway and twisted her left ankle on 1/18/18 while at [facility]. She states she noticed pain and swelling immediately after the injury. She states that she feels as though she is a 80year old woman. She completed a Euflexxa series on 4/13/16 with relief.      Pain Assessment  3/20/2018   Location of Pain Shoulder   Location Modifiers Left;Right   Severity of Pain 8   Quality of Pain Aching   Duration of Pain A few minutes   Duration of Pain Comment wrose at night   Frequency of Pain Intermittent   Aggravating Factors (No Data)   Aggravating Factors Comment when leaning on something   Limiting Behavior No   Relieving Factors NSAID   Result of Injury No      Occupation, etc: Ms. Salome Pacheco works as a  for the [employer]. Her mother and  both had difficult experiences with the gastric bypass surgery. She lives in [place] with her  and son. Her son just made the varsity high school baseball team at [employer]. Her daughter attends [employer] and is studying pre-med. She is right handed. She is hypertensive. She is not diabetic. She reports she has gained weight recently--to 295#.  She is 5'3\".          Last 3 Recorded Weights in this Encounter     03/20/18 1014   Weight: 295 lb 12.8 oz (134.2 kg)      Body mass index is 52.4 kg/(m^2).          Patient Active Problem List   Diagnosis Code    Vitamin D deficiency E55.9    DDD (degenerative disc disease), cervical M50.30    HTN (hypertension) I10    Iron deficiency anemia D50.9    Hypercholesterolemia E78.00    Prediabetes R73.03    Knee pain, left M25.562    Sleep apnea G47.30    Obesity, morbid (HCC) E66.01         REVIEW OF SYSTEMS: All Below are Negative except: See HPI                 Constitutional: negative for fever, chills, and weight loss. Cardiovascular: negative for chest pain, claudication, leg swelling, SOB, STERLING              Gastrointestinal: Negative for pain, N/V/C/D, Blood in stool or urine, dysuria,       hematuria, incontinence, pelvic pain. Musculoskeletal: See HPI              Neurological: Negative for dizziness and weakness. Negative for headaches, Visual changes, confusion, seizures              Phychiatric/Behavioral: Negative for depression, memory loss, substance          abuse. Extremities: Negative for hair changes, rash, or skin lesion changes.               Hematologic: Negative for bleeding problems, bruising, pallor or swollen lymph   nodes              Peripheral Vascular: No calf pain, no circulation deficits.     Social History   Social History            Social History    Marital status:        Spouse name: N/A    Number of children: N/A    Years of education: N/A          Occupational History    Not on file.              Social History Main Topics     Smoking status: Never Smoker     Smokeless tobacco: Never Used     Alcohol use 0.0 oz/week       2 Glasses of wine, 0 Standard drinks or equivalent per week         Comment: on ocassion     Drug use: No     Sexual activity: Not on file            Other Topics Concern    Not on file          Social History Narrative     ** Merged History Encounter **                  No Known Allergies           Current Outpatient Prescriptions   Medication Sig    losartan-hydroCHLOROthiazide (HYZAAR) 100-12.5 mg per tablet TAKE ONE TABLET BY MOUTH ONCE DAILY    ibuprofen (MOTRIN) 800 mg tablet TAKE ONE TABLET BY MOUTH THREE TIMES DAILY    omeprazole (PRILOSEC) 40 mg capsule Take 1 Cap by mouth daily.  simvastatin (ZOCOR) 10 mg tablet TAKE ONE TABLET BY MOUTH NIGHTLY    ferrous sulfate 325 mg (65 mg iron) tablet TAKE ONE TABLET BY MOUTH THREE TIMES DAILY WITH MEALS    cholecalciferol (VITAMIN D3) 1,000 unit cap Take  by mouth daily.     clobetasol (TEMOVATE) 0.05 % topical gel        No current facility-administered medications for this visit.          PHYSICAL EXAMINATION:       Visit Vitals    BP (!) 162/92    Pulse 80    Temp 98.1 °F (36.7 °C) (Oral)    Resp 16    Ht 5' 3\" (1.6 m)    Wt 295 lb 12.8 oz (134.2 kg)    SpO2 98%    BMI 52.4 kg/m2      2 + pretibial edema   Examination Left knee Right knee   Skin Intact Intact   Range of motion 90-5 90-5   Effusion - -   Medial joint line tenderness + +   Lateral joint line tenderness - -   Popliteal tenderness - -   Osteophytes palpable + +   Yazans - -   Patella crepitus + +   Anterior drawer - -   Lateral laxity - -   Medial laxity - -   Varus deformity - -   Valgus deformity - -   Pretibial edema - -   Calf tenderness - -      PROCEDURE: After discussing treatment options, patient's left shoulder was injected with 4 cc Marcaine and 1/2 cc Celestone.     Chart reviewed for the following:              Anival Sanderson MD, have reviewed the History, Physical and updated the Allergic reactions for [address] performed immediately prior to start of procedure:  Anival Sanderson MD, have performed the following reviews on Michael Marquez prior to the start of the procedure:            * Patient was identified by name and date of birth   * Agreement on procedure being performed was verified  * Risks and Benefits explained to the patient  * Procedure site verified and marked as necessary  * Patient was positioned for comfort  * Consent was obtained                Time: 10:40 AM      Date of procedure: 7/18/22     Procedure performed by: Bj Vang MD     Ms. Benson tolerated the procedure well with no complications.     Three views of right knee: no fractures, no effusion, mod severe joint space narrowing, ++ osteophytes present. MRI LEFT KNEE WO CONT 9/21/16  Impression:  Focally macerated posterior horn of the medial meniscus between the meniscal  root and meniscal body, probably near complete tear, with some foreshortening  and thickening of the meniscal root evident. Joint effusion. Slitlike Baker's cyst.  Mild degenerative change of the medial joint space.     RADIOGRAPHS:   XR BILATERAL SHOULDER 3/20/18  IMPRESSION:  Three views - No fractures, no acromioclavicular narrowing, no glenohumeral narrowing, no calcific densities.     XR LEFT ANKLE 3/20/18  IMPRESSION:  Three views - No fractures, no degenerative changes.     XR KNEE PREVIOUS  Previous x-rays show moderate medial joint space narrowing. No fractures.     IMPRESSION:        ICD-10-CM ICD-9-CM     1. Chronic right shoulder pain M25.511 719.41 AMB POC XRAY, SHOULDER; COMPLETE, 2+     G89.29 338.29     2. Chronic left shoulder pain M25.512 719.41 AMB POC XRAY, SHOULDER; COMPLETE, 2+     G89.29 338.29 betamethasone (CELESTONE SOLUSPAN) 6 mg/mL injection         BETAMETHASONE ACETATE & SODIUM PHOSPHATE INJECTION 3 MG EA.         DRAIN/INJECT LARGE JOINT/BURSA         bupivacaine, PF, (MARCAINE, PF,) 0.25 % (2.5 mg/mL) injection   3. Chronic pain of left ankle M25.572 719.47 AMB POC XRAY, ANKLE; COMPLETE, 3+ VIE     G89.29 338.29     4. Chronic shoulder bursitis, right M75.51 726.10     5. Chronic shoulder bursitis, left M75.52 726.10 betamethasone (CELESTONE SOLUSPAN) 6 mg/mL injection         BETAMETHASONE ACETATE & SODIUM PHOSPHATE INJECTION 3 MG EA.         DRAIN/INJECT LARGE JOINT/BURSA         bupivacaine, PF, (MARCAINE, PF,) 0.25 % (2.5 mg/mL) injection   6. Sprain of left ankle, unspecified ligament, initial encounter S93.402A 845.00     7.  BMI 50.0-59.9, adult ([facility] [address] Z68.43 V85.43        PLAN: After discussing treatment options, patient's right knee was injected with 4 cc Marcaine and 1/2 cc Celestone. She will follow up as needed. There is no need for knee surgery at this time. Weight loss options were discussed today.      Asberry Gitelman, MD

## 2022-07-27 DIAGNOSIS — E66.01 MORBID OBESITY WITH BMI OF 50.0-59.9, ADULT (HCC): ICD-10-CM

## 2022-08-05 RX ORDER — PHENTERMINE HYDROCHLORIDE 37.5 MG/1
CAPSULE ORAL
Qty: 30 CAPSULE | Refills: 0 | Status: SHIPPED | OUTPATIENT
Start: 2022-08-05 | End: 2022-09-09

## 2022-08-11 ENCOUNTER — OFFICE VISIT (OUTPATIENT)
Dept: FAMILY MEDICINE CLINIC | Age: 57
End: 2022-08-11
Payer: COMMERCIAL

## 2022-08-11 VITALS
WEIGHT: 292.2 LBS | DIASTOLIC BLOOD PRESSURE: 77 MMHG | HEIGHT: 63 IN | BODY MASS INDEX: 51.77 KG/M2 | RESPIRATION RATE: 16 BRPM | TEMPERATURE: 97.7 F | HEART RATE: 89 BPM | SYSTOLIC BLOOD PRESSURE: 126 MMHG | OXYGEN SATURATION: 96 %

## 2022-08-11 DIAGNOSIS — E11.65 CONTROLLED TYPE 2 DIABETES MELLITUS WITH HYPERGLYCEMIA, WITHOUT LONG-TERM CURRENT USE OF INSULIN (HCC): ICD-10-CM

## 2022-08-11 DIAGNOSIS — I10 ESSENTIAL HYPERTENSION: Primary | ICD-10-CM

## 2022-08-11 DIAGNOSIS — D50.9 IRON DEFICIENCY ANEMIA, UNSPECIFIED IRON DEFICIENCY ANEMIA TYPE: ICD-10-CM

## 2022-08-11 DIAGNOSIS — E55.9 VITAMIN D DEFICIENCY: ICD-10-CM

## 2022-08-11 PROCEDURE — 99214 OFFICE O/P EST MOD 30 MIN: CPT | Performed by: FAMILY MEDICINE

## 2022-08-11 PROCEDURE — 3051F HG A1C>EQUAL 7.0%<8.0%: CPT | Performed by: FAMILY MEDICINE

## 2022-08-11 NOTE — PROGRESS NOTES
HPI:  Oh Harvey is a 62 y.o. female who presents today with   Chief Complaint   Patient presents with    Diabetes     4m f/u    Hypertension         Patient is here to follow-up on diabetes and high blood pressure  She is on medications as below  She is compliant with her medications  She is still attempting to lose weight. She is due for blood work. Patient also is in need of follow-up blood work for her anemia and history of vitamin D deficiency  3 most recent PHQ Screens 8/11/2022   PHQ Not Done Patient refuses   Little interest or pleasure in doing things Not at all   Feeling down, depressed, irritable, or hopeless Not at all   Total Score PHQ 2 0               PMH,  Meds, Allergies, Family History, Social history reviewed      Current Outpatient Medications   Medication Sig Dispense Refill    phentermine 37.5 mg capsule TAKE 1 CAPSULE BY MOUTH ONCE DAILY IN THE MORNING. MAX DAILY AMOUNT: 1 CAPSULE 30 Capsule 0    amLODIPine (NORVASC) 5 mg tablet Take 1 tablet by mouth once daily 90 Tablet 2    losartan-hydroCHLOROthiazide (HYZAAR) 100-12.5 mg per tablet Take 1 tablet by mouth once daily 90 Tablet 3    omeprazole (PRILOSEC) 40 mg capsule Take 1 capsule by mouth once daily 90 Capsule 2    cholecalciferol (VITAMIN D3) (5000 Units/125 mcg) tab tablet Take 5,000 Units by mouth daily. ferrous sulfate 325 mg (65 mg iron) tablet Take 1 Tablet by mouth daily. 90 Tablet 3    ibuprofen (MOTRIN) 800 mg tablet Take 1 Tablet by mouth every eight (8) hours as needed for Pain.  90 Tablet 2    simvastatin (ZOCOR) 10 mg tablet Take 1 tablet by mouth nightly 90 Tablet 3        Allergies   Allergen Reactions    Lisinopril Cough                  ROS  as above      Visit Vitals  /77 (BP 1 Location: Right arm, BP Patient Position: Sitting, BP Cuff Size: Large adult)   Pulse 89   Temp 97.7 °F (36.5 °C) (Temporal)   Resp 16   Ht 5' 3\" (1.6 m)   Wt 292 lb 3.2 oz (132.5 kg)   LMP 09/22/2018   SpO2 96%   BMI 51.76 kg/m²     Physical Exam  General appearance: alert, cooperative, no distress, appears stated age  Neck: supple, symmetrical, trachea midline, no adenopathy, thyroid: not enlarged, symmetric, no tenderness/mass/nodules, no carotid bruit and no JVD  Lungs: clear to auscultation bilaterally  Heart: regular rate and rhythm, S1, S2 normal, no murmur, click, rub or gallop    Extremities: extremities normal, atraumatic, no cyanosis or edema      Assessment/Plan:  Diagnoses and all orders for this visit:    1. Essential hypertension  -     LIPID PANEL; Future  -     METABOLIC PANEL, COMPREHENSIVE; Future    2. Vitamin D deficiency  -     VITAMIN D, 25 HYDROXY; Future    3. Iron deficiency anemia, unspecified iron deficiency anemia type    4. Controlled type 2 diabetes mellitus with hyperglycemia, without long-term current use of insulin (HCC)  -     HEMOGLOBIN A1C WITH EAG; Future    As above  Patient is stable  Labs as ordered  Continue weight loss efforts  Follow-up and Dispositions    Return in about 4 months (around 12/11/2022). This has been fully explained to the patient, who indicates understanding. An After Visit Summary was printed and given to the patient. Follow-up and Dispositions    Return in about 4 months (around 12/11/2022).             Madelin Allen MD

## 2022-08-11 NOTE — PATIENT INSTRUCTIONS
DASH Diet: Care Instructions  Your Care Instructions     The DASH diet is an eating plan that can help lower your blood pressure. DASH stands for Dietary Approaches to Stop Hypertension. Hypertension is high blood pressure. The DASH diet focuses on eating foods that are high in calcium, potassium, and magnesium. These nutrients can lower blood pressure. The foods that are highest in these nutrients are fruits, vegetables, low-fat dairy products, nuts, seeds, and legumes. But taking calcium, potassium, and magnesium supplements instead of eating foods that are high in those nutrients does not have the same effect. The DASH diet also includes whole grains, fish, and poultry. The DASH diet is one of several lifestyle changes your doctor may recommend to lower your high blood pressure. Your doctor may also want you to decrease the amount of sodium in your diet. Lowering sodium while following the DASH diet can lower blood pressure even further than just the DASH diet alone. Follow-up care is a key part of your treatment and safety. Be sure to make and go to all appointments, and call your doctor if you are having problems. It's also a good idea to know your test results and keep a list of the medicines you take. How can you care for yourself at home? Following the DASH diet  Eat 4 to 5 servings of fruit each day. A serving is 1 medium-sized piece of fruit, ½ cup chopped or canned fruit, 1/4 cup dried fruit, or 4 ounces (½ cup) of fruit juice. Choose fruit more often than fruit juice. Eat 4 to 5 servings of vegetables each day. A serving is 1 cup of lettuce or raw leafy vegetables, ½ cup of chopped or cooked vegetables, or 4 ounces (½ cup) of vegetable juice. Choose vegetables more often than vegetable juice. Get 2 to 3 servings of low-fat and fat-free dairy each day. A serving is 8 ounces of milk, 1 cup of yogurt, or 1 ½ ounces of cheese. Eat 6 to 8 servings of grains each day.  A serving is 1 slice of bread, 1 ounce of dry cereal, or ½ cup of cooked rice, pasta, or cooked cereal. Try to choose whole-grain products as much as possible. Limit lean meat, poultry, and fish to 2 servings each day. A serving is 3 ounces, about the size of a deck of cards. Eat 4 to 5 servings of nuts, seeds, and legumes (cooked dried beans, lentils, and split peas) each week. A serving is 1/3 cup of nuts, 2 tablespoons of seeds, or ½ cup of cooked beans or peas. Limit fats and oils to 2 to 3 servings each day. A serving is 1 teaspoon of vegetable oil or 2 tablespoons of salad dressing. Limit sweets and added sugars to 5 servings or less a week. A serving is 1 tablespoon jelly or jam, ½ cup sorbet, or 1 cup of lemonade. Eat less than 2,300 milligrams (mg) of sodium a day. If you limit your sodium to 1,500 mg a day, you can lower your blood pressure even more. Be aware that all of these are the suggested number of servings for people who eat 1,800 to 2,000 calories a day. Your recommended number of servings may be different if you need more or fewer calories. Tips for success  Start small. Do not try to make dramatic changes to your diet all at once. You might feel that you are missing out on your favorite foods and then be more likely to not follow the plan. Make small changes, and stick with them. Once those changes become habit, add a few more changes. Try some of the following:  Make it a goal to eat a fruit or vegetable at every meal and at snacks. This will make it easy to get the recommended amount of fruits and vegetables each day. Try yogurt topped with fruit and nuts for a snack or healthy dessert. Add lettuce, tomato, cucumber, and onion to sandwiches. Combine a ready-made pizza crust with low-fat mozzarella cheese and lots of vegetable toppings. Try using tomatoes, squash, spinach, broccoli, carrots, cauliflower, and onions.   Have a variety of cut-up vegetables with a low-fat dip as an appetizer instead of chips and dip.  Sprinkle sunflower seeds or chopped almonds over salads. Or try adding chopped walnuts or almonds to cooked vegetables. Try some vegetarian meals using beans and peas. Add garbanzo or kidney beans to salads. Make burritos and tacos with mashed raygoza beans or black beans. Where can you learn more? Go to http://www.holm.com/  Enter H967 in the search box to learn more about \"DASH Diet: Care Instructions. \"  Current as of: January 10, 2022               Content Version: 13.2  © 3301-7601 [employer]. Care instructions adapted under license by [employer] (which disclaims liability or warranty for this information). If you have questions about a medical condition or this instruction, always ask your healthcare professional. [address] any warranty or liability for your use of this information.

## 2022-08-11 NOTE — PROGRESS NOTES
1. \"Have you been to the ER, urgent care clinic since your last visit? Hospitalized since your last visit? \" No    2. \"Have you seen or consulted any other health care providers outside of the [address] [place] [address] since your last visit? \"  Yes, Dr. Ghassan Peralta with OBGYN       3. For patients aged 39-70: Has the patient had a colonoscopy / FIT/ Cologuard? No      If the patient is female:    4. For patients aged 41-77: Has the patient had a mammogram within the past 2 years? No      5. For patients aged 21-65: Has the patient had a pap smear?  NA - based on age or sex

## 2022-08-12 LAB
25(OH)D3 SERPL-MCNC: 47.6 NG/ML (ref 32–100)
A-G RATIO,AGRAT: 1.7 RATIO (ref 1.1–2.6)
ALBUMIN SERPL-MCNC: 4.2 G/DL (ref 3.5–5)
ALP SERPL-CCNC: 88 U/L (ref 25–115)
ALT SERPL-CCNC: 17 U/L (ref 5–40)
ANION GAP SERPL CALC-SCNC: 12 MMOL/L (ref 3–15)
AST SERPL W P-5'-P-CCNC: 11 U/L (ref 10–37)
AVG GLU, 10930: 154 MG/DL (ref 91–123)
BILIRUB SERPL-MCNC: 0.1 MG/DL (ref 0.2–1.2)
BUN SERPL-MCNC: 19 MG/DL (ref 6–22)
CALCIUM SERPL-MCNC: 9.3 MG/DL (ref 8.4–10.5)
CHLORIDE SERPL-SCNC: 104 MMOL/L (ref 98–110)
CHOLEST SERPL-MCNC: 180 MG/DL (ref 110–200)
CO2 SERPL-SCNC: 26 MMOL/L (ref 20–32)
CREAT SERPL-MCNC: 0.7 MG/DL (ref 0.5–1.2)
GLOBULIN,GLOB: 2.5 G/DL (ref 2–4)
GLOMERULAR FILTRATION RATE: >60 ML/MIN/1.73 SQ.M.
GLUCOSE SERPL-MCNC: 130 MG/DL (ref 70–99)
HBA1C MFR BLD HPLC: 7 % (ref 4.8–5.6)
HDLC SERPL-MCNC: 3.7 MG/DL (ref 0–5)
HDLC SERPL-MCNC: 49 MG/DL
LDL/HDL RATIO,LDHD: 2.3
LDLC SERPL CALC-MCNC: 112 MG/DL (ref 50–99)
NON-HDL CHOLESTEROL, 011976: 131 MG/DL
POTASSIUM SERPL-SCNC: 4.2 MMOL/L (ref 3.5–5.5)
PROT SERPL-MCNC: 6.7 G/DL (ref 6.4–8.3)
SODIUM SERPL-SCNC: 142 MMOL/L (ref 133–145)
TRIGL SERPL-MCNC: 97 MG/DL (ref 40–149)
VLDLC SERPL CALC-MCNC: 19 MG/DL (ref 8–30)

## 2023-01-27 RX ORDER — OMEPRAZOLE 40 MG/1
CAPSULE, DELAYED RELEASE ORAL
Qty: 90 CAPSULE | Refills: 0 | Status: SHIPPED | OUTPATIENT
Start: 2023-01-27

## 2023-02-07 DIAGNOSIS — M17.0 PRIMARY OSTEOARTHRITIS OF KNEES, BILATERAL: ICD-10-CM

## 2023-02-07 DIAGNOSIS — M25.551 RIGHT HIP PAIN: ICD-10-CM

## 2023-03-14 ENCOUNTER — OFFICE VISIT (OUTPATIENT)
Age: 58
End: 2023-03-14
Payer: COMMERCIAL

## 2023-03-14 VITALS
SYSTOLIC BLOOD PRESSURE: 129 MMHG | BODY MASS INDEX: 51.91 KG/M2 | DIASTOLIC BLOOD PRESSURE: 81 MMHG | WEIGHT: 293 LBS | HEIGHT: 63 IN | HEART RATE: 79 BPM | TEMPERATURE: 97.7 F | RESPIRATION RATE: 18 BRPM | OXYGEN SATURATION: 98 %

## 2023-03-14 DIAGNOSIS — E11.65 TYPE 2 DIABETES MELLITUS WITH HYPERGLYCEMIA, WITHOUT LONG-TERM CURRENT USE OF INSULIN (HCC): Primary | ICD-10-CM

## 2023-03-14 PROCEDURE — 99214 OFFICE O/P EST MOD 30 MIN: CPT | Performed by: FAMILY MEDICINE

## 2023-03-14 PROCEDURE — 3078F DIAST BP <80 MM HG: CPT | Performed by: FAMILY MEDICINE

## 2023-03-14 PROCEDURE — 3074F SYST BP LT 130 MM HG: CPT | Performed by: FAMILY MEDICINE

## 2023-03-14 RX ORDER — AMLODIPINE BESYLATE 5 MG/1
TABLET ORAL
Qty: 90 TABLET | Refills: 3 | Status: SHIPPED | OUTPATIENT
Start: 2023-03-14

## 2023-03-14 RX ORDER — SEMAGLUTIDE 1.34 MG/ML
INJECTION, SOLUTION SUBCUTANEOUS
Qty: 5 ADJUSTABLE DOSE PRE-FILLED PEN SYRINGE | Refills: 0 | Status: SHIPPED | OUTPATIENT
Start: 2023-03-14 | End: 2023-05-13

## 2023-03-14 RX ORDER — LOSARTAN POTASSIUM AND HYDROCHLOROTHIAZIDE 12.5; 1 MG/1; MG/1
TABLET ORAL
Qty: 90 TABLET | Refills: 3 | Status: SHIPPED | OUTPATIENT
Start: 2023-03-14

## 2023-03-14 SDOH — ECONOMIC STABILITY: FOOD INSECURITY: WITHIN THE PAST 12 MONTHS, YOU WORRIED THAT YOUR FOOD WOULD RUN OUT BEFORE YOU GOT MONEY TO BUY MORE.: NEVER TRUE

## 2023-03-14 SDOH — ECONOMIC STABILITY: INCOME INSECURITY: HOW HARD IS IT FOR YOU TO PAY FOR THE VERY BASICS LIKE FOOD, HOUSING, MEDICAL CARE, AND HEATING?: NOT HARD AT ALL

## 2023-03-14 SDOH — ECONOMIC STABILITY: HOUSING INSECURITY
IN THE LAST 12 MONTHS, WAS THERE A TIME WHEN YOU DID NOT HAVE A STEADY PLACE TO SLEEP OR SLEPT IN A SHELTER (INCLUDING NOW)?: NO

## 2023-03-14 SDOH — ECONOMIC STABILITY: FOOD INSECURITY: WITHIN THE PAST 12 MONTHS, THE FOOD YOU BOUGHT JUST DIDN'T LAST AND YOU DIDN'T HAVE MONEY TO GET MORE.: NEVER TRUE

## 2023-03-14 ASSESSMENT — PATIENT HEALTH QUESTIONNAIRE - PHQ9
SUM OF ALL RESPONSES TO PHQ QUESTIONS 1-9: 0
SUM OF ALL RESPONSES TO PHQ QUESTIONS 1-9: 0
SUM OF ALL RESPONSES TO PHQ9 QUESTIONS 1 & 2: 0
1. LITTLE INTEREST OR PLEASURE IN DOING THINGS: 0
SUM OF ALL RESPONSES TO PHQ QUESTIONS 1-9: 0
SUM OF ALL RESPONSES TO PHQ QUESTIONS 1-9: 0
2. FEELING DOWN, DEPRESSED OR HOPELESS: 0

## 2023-03-14 NOTE — PROGRESS NOTES
1. \"Have you been to the ER, urgent care clinic since your last visit? Hospitalized since your last visit? \" No    2. \"Have you seen or consulted any other health care providers outside of the [address] [place] [address] since your last visit? \" Sheyla Bonilla    3. For patients aged 39-70: Has the patient had a colonoscopy / FIT/ Cologuard? Yes - Care Gap present. Most recent result on file      If the patient is female:    4. For patients aged 41-77: Has the patient had a mammogram within the past 2 years? Yes - Care Gap present. Most recent result on file      5. For patients aged 21-65: Has the patient had a pap smear? Yes - Care Gap present.  Most recent result on file
Size: Thigh)   Pulse 79   Temp 97.7 °F (36.5 °C) (Temporal)   Resp 18   Ht 5' 3\" (1.6 m)   Wt (!) 304 lb (137.9 kg)   LMP  (LMP Unknown)   SpO2 98%   BMI 53.85 kg/m²   General appearance: alert, cooperative, no distress, appears stated age  Neck: supple, symmetrical, trachea midline, no adenopathy, thyroid: not enlarged, symmetric, no tenderness/mass/nodules, no carotid bruit and no JVD  Lungs: clear to auscultation bilaterally  Heart: regular rate and rhythm, S1, S2 normal, no murmu  Extremities: extremities normal, atraumatic, no cyanosis or edemar, click, rub or gallop  Abdomen: soft, non-tender. Bowel sounds normal. No masses,  no organomegaly      Assessment/Plan:  Ky Humphries was seen today for discuss medications. Diagnoses and all orders for this visit:    Type 2 diabetes mellitus with hyperglycemia, without long-term current use of insulin (HCC)    Other orders  -     Semaglutide,0.25 or 0.5MG/DOS, (OZEMPIC, 0.25 OR 0.5 MG/DOSE,) 2 MG/1.5ML SOPN; Inject 0.25 mg into the skin every 7 days for 30 days, THEN 0.5 mg every 7 days. -     amLODIPine (NORVASC) 5 MG tablet; Take 1 tablet by mouth once daily  -     losartan-hydroCHLOROthiazide (HYZAAR) 100-12.5 MG per tablet; Take 1 tablet by mouth once daily      As above  Semaglutide as per orders  Refilled other medications needed  Encouraged physical activity as tolerated  Return in about 4 months (around 7/14/2023) for htn, cholesterol. This has been fully explained to the patient, who indicates understanding. AVS is accessible thru mychart and pt has been advised of same.        Wilma Dos Santos MD

## 2023-03-16 ENCOUNTER — OFFICE VISIT (OUTPATIENT)
Age: 58
End: 2023-03-16

## 2023-03-16 VITALS — HEIGHT: 63 IN | BODY MASS INDEX: 51.91 KG/M2 | TEMPERATURE: 97.7 F | WEIGHT: 293 LBS

## 2023-03-16 DIAGNOSIS — M25.562 CHRONIC PAIN OF LEFT KNEE: Primary | ICD-10-CM

## 2023-03-16 DIAGNOSIS — M25.561 CHRONIC PAIN OF RIGHT KNEE: ICD-10-CM

## 2023-03-16 DIAGNOSIS — M17.11 PRIMARY OSTEOARTHRITIS OF RIGHT KNEE: ICD-10-CM

## 2023-03-16 DIAGNOSIS — M17.12 PRIMARY OSTEOARTHRITIS OF LEFT KNEE: ICD-10-CM

## 2023-03-16 DIAGNOSIS — G89.29 CHRONIC PAIN OF RIGHT KNEE: ICD-10-CM

## 2023-03-16 DIAGNOSIS — G89.29 CHRONIC PAIN OF LEFT KNEE: Primary | ICD-10-CM

## 2023-03-16 RX ORDER — AMLODIPINE BESYLATE 5 MG/1
TABLET ORAL
Qty: 90 TABLET | Refills: 3 | OUTPATIENT
Start: 2023-03-16

## 2023-03-16 RX ORDER — TRIAMCINOLONE ACETONIDE 40 MG/ML
40 INJECTION, SUSPENSION INTRA-ARTICULAR; INTRAMUSCULAR ONCE
Status: COMPLETED | OUTPATIENT
Start: 2023-03-16 | End: 2023-03-16

## 2023-03-16 RX ADMIN — TRIAMCINOLONE ACETONIDE 40 MG: 40 INJECTION, SUSPENSION INTRA-ARTICULAR; INTRAMUSCULAR at 16:03

## 2023-03-16 RX ADMIN — TRIAMCINOLONE ACETONIDE 40 MG: 40 INJECTION, SUSPENSION INTRA-ARTICULAR; INTRAMUSCULAR at 16:02

## 2023-03-16 NOTE — PROGRESS NOTES
Patient: Reggie Barbosa                MRN:  [id number]       SSN: [id number]   YOB: 1965        AGE:  48 y.o. SEX: female          PCP: Nona Abdullahi MD  03/16/23    CC:  LEFT KNEE PAIN     HISTORY:  Reggie Barbosa is a 48 y.o. female who is seen for bilateral knee pain. She does not recall any recent injury or trauma to her knees. She sustained an old injury when she stepped off a curb into a pile of snow that they had pushed next to the walkway. She twisted her left ankle on 1/18/18  while at [facility]. She states she noticed pain and swelling immediately after the injury. She states that at times she feels as though she is  a 80year old woman. She completed a successful Euflexxa series on 4/13/16. Pain Assessment   3/20/2018         Location of Pain  Shoulder         Location Modifiers  Left;Right         Severity of Pain  8         Quality of Pain  Aching         Duration of Pain  A few minutes         Duration of Pain Comment  wrose at night         Frequency of Pain  Intermittent         Aggravating Factors  (No Data)         Aggravating Factors Comment  when leaning on something         Limiting Behavior  No         Relieving Factors  NSAID         Result of Injury  No             Occupation, etc: Ms. Ace Tobin works as a  for the [employer]. Her mother and  both had difficult experiences with the gastric bypass surgery. She lives in [place] with her  and son. Her son just made the varsity high school baseball team at [employer]. Her daughter attends [employer] and is studying pre-med. She is right handed. She is hypertensive. She is not diabetic. She reports she has gained weight recently--to 295#. She is 5'3\". Last 3 Recorded Weights in this Encounter           03/20/18 1014         Weight:  295 lb 12.8 oz (134.2 kg)             Body mass index is 52.4 kg/(m^2). Patient Active Problem List        Diagnosis  Code           Vitamin D deficiency  E55.9            DDD (degenerative disc disease), cervical  M50.30            HTN (hypertension)  I10            Iron deficiency anemia  D50.9            Hypercholesterolemia  E78.00            Prediabetes  R73.03            Knee pain, left  M25.562            Sleep apnea  G47.30            Obesity, morbid ([facility] [address]  E66.01                  REVIEW OF SYSTEMS: All Below are Negative  except: See HPI                   Constitutional: negative for fever, chills, and weight loss. Cardiovascular: negative for chest pain, claudication, leg swelling, SOB, HERNANDEZ               Gastrointestinal: Negative for pain, N/V/C/D, Blood in stool or urine, dysuria,       hematuria, incontinence,  pelvic pain. Musculoskeletal: See HPI               Neurological: Negative for dizziness and weakness. Negative for headaches, Visual changes, confusion, seizures               Phychiatric/Behavioral: Negative for depression, memory loss, substance          abuse. Extremities: Negative for hair changes, rash, or skin lesion changes. Hematologic: Negative for bleeding problems, bruising, pallor or swollen lymph   nodes               Peripheral Vascular: No calf pain, no circulation deficits. Social History        Social History                             Social History          Marital status:                  Spouse name:  N/A             Number of children:  N/A            Years of education:  N/A                         Occupational History          Not on file.                                     Social History Main Topics             Smoking status:  Never Smoker               Smokeless tobacco:  Never Used               Alcohol use  0.0 oz/week                2 Glasses of wine, 0 Standard drinks or equivalent per week                   Comment: on ocassion                Drug use:  No               Sexual activity:  Not on file                               Other Topics  Concern           Not on file                        Social History Narrative           ** Merged History Encounter **                                      No Known Allergies                      Current Outpatient Prescriptions        Medication  Sig           losartan-hydroCHLOROthiazide (HYZAAR) 100-12.5 mg per tablet  TAKE ONE TABLET BY MOUTH ONCE DAILY            ibuprofen (MOTRIN) 800 mg tablet  TAKE ONE TABLET BY MOUTH THREE TIMES DAILY            omeprazole (PRILOSEC) 40 mg capsule  Take 1 Cap by mouth daily. simvastatin (ZOCOR) 10 mg tablet  TAKE ONE TABLET BY MOUTH NIGHTLY            ferrous sulfate 325 mg (65 mg iron) tablet  TAKE ONE TABLET BY MOUTH THREE TIMES DAILY WITH MEALS            cholecalciferol (VITAMIN D3) 1,000 unit cap  Take  by mouth daily. clobetasol (TEMOVATE) 0.05 % topical gel                 No current facility-administered medications for this visit.                  PHYSICAL EXAMINATION:                 Visit Vitals          BP  (!) 162/92            Pulse  80            Temp  98.1 °F (36.7 °C) (Oral)            Resp  16            Ht  5' 3\" (1.6 m)            Wt  295 lb 12.8 oz (134.2 kg)            SpO2  98%            BMI  52.4 kg/m2        Examination  Left knee  Right knee          Skin  Intact  Intact          Range of motion  90-5  90-5          Effusion  -  -          Medial joint line tenderness  +  +          Lateral joint line tenderness  -  -          Popliteal tenderness  -  -          Osteophytes palpable  +  +          Kristal's  -  -          Patella crepitus  +  +          Anterior drawer  -  -          Lateral laxity  -  -          Medial laxity  -  -          Varus deformity  -  -          Valgus deformity  -  -          Pretibial edema  -  -          Calf tenderness  -  -             Chart reviewed for the following:   Jen SANTANA MD, have reviewed the History, Physical and updated the Allergic reactions for [address] [place] [address] [id number] performed immediately prior to start of procedure:  Corina Tariq MD, have performed the following reviews on Angel Jose prior to the start of the procedure:            * Patient was identified by name and date of birth   * Agreement on procedure being performed was verified  * Risks and Benefits explained to the patient  * Procedure site verified and marked as necessary  * Patient was positioned for comfort  * Consent was obtained  Time: 3:08 PM  Date of procedure: 3/16/2023    Procedure performed by:  Dr. Ruby Warren    Ms. Jana Costa tolerated the procedure well with no complications. MRI LEFT KNEE WO CONT 9/21/16   Impression:   Focally macerated posterior horn of the medial meniscus between the meniscal   root and meniscal body, probably near complete tear, with some foreshortening   and thickening of the meniscal root evident. Joint effusion. Slitlike Baker's cyst.   Mild degenerative change of the medial joint space. RADIOGRAPHS:    XR BILATERAL SHOULDER 3/20/18   IMPRESSION:  Three views - No fractures, no acromioclavicular narrowing, no glenohumeral narrowing, no calcific densities. XR LEFT ANKLE 3/20/18   IMPRESSION:  Three views - No fractures, no degenerative changes. XR KNEE PREVIOUS   Previous x-rays show moderate medial joint space narrowing. No fractures. IMPRESSION:    Encounter Diagnoses   Name Primary? Chronic pain of left knee Yes    Primary osteoarthritis of left knee     Chronic pain of right knee     Primary osteoarthritis of right knee        PLAN:  After discussing treatment options, patient's knees was injected with 4 cc Marcaine and 1/2 cc Kenalog. I will see her back in 1 -2 months. Consider viscosupplementation if pain continues.

## 2023-05-02 DIAGNOSIS — I10 ESSENTIAL (PRIMARY) HYPERTENSION: ICD-10-CM

## 2023-05-02 DIAGNOSIS — E55.9 VITAMIN D DEFICIENCY, UNSPECIFIED: ICD-10-CM

## 2023-05-02 DIAGNOSIS — E11.65 TYPE 2 DIABETES MELLITUS WITH HYPERGLYCEMIA, WITHOUT LONG-TERM CURRENT USE OF INSULIN (HCC): Primary | ICD-10-CM

## 2023-05-08 RX ORDER — IBUPROFEN 800 MG/1
TABLET ORAL
Qty: 90 TABLET | Refills: 0 | OUTPATIENT
Start: 2023-05-08

## 2023-05-08 RX ORDER — IBUPROFEN 800 MG/1
TABLET ORAL
Qty: 90 TABLET | Refills: 0 | Status: SHIPPED | OUTPATIENT
Start: 2023-05-08

## 2023-05-17 NOTE — TELEPHONE ENCOUNTER
Last Visit: 03/14/2023   Next Appointment: 05/24/2023      Requested Prescriptions     Pending Prescriptions Disp Refills    omeprazole (PRILOSEC) 40 MG delayed release capsule [Pharmacy Med Name: Omeprazole 40 MG Oral Capsule Delayed Release] 90 capsule 0     Sig: Take 1 capsule by mouth once daily

## 2023-05-19 RX ORDER — OMEPRAZOLE 40 MG/1
CAPSULE, DELAYED RELEASE ORAL
Qty: 90 CAPSULE | Refills: 1 | Status: SHIPPED | OUTPATIENT
Start: 2023-05-19

## 2023-05-24 ENCOUNTER — OFFICE VISIT (OUTPATIENT)
Age: 58
End: 2023-05-24
Payer: COMMERCIAL

## 2023-05-24 VITALS
TEMPERATURE: 98.5 F | BODY MASS INDEX: 51.91 KG/M2 | HEART RATE: 85 BPM | WEIGHT: 293 LBS | SYSTOLIC BLOOD PRESSURE: 130 MMHG | OXYGEN SATURATION: 96 % | RESPIRATION RATE: 18 BRPM | DIASTOLIC BLOOD PRESSURE: 83 MMHG | HEIGHT: 63 IN

## 2023-05-24 DIAGNOSIS — E11.65 TYPE 2 DIABETES MELLITUS WITH HYPERGLYCEMIA, WITHOUT LONG-TERM CURRENT USE OF INSULIN (HCC): Primary | ICD-10-CM

## 2023-05-24 DIAGNOSIS — D50.8 OTHER IRON DEFICIENCY ANEMIA: ICD-10-CM

## 2023-05-24 DIAGNOSIS — E55.9 VITAMIN D DEFICIENCY: ICD-10-CM

## 2023-05-24 PROCEDURE — 3074F SYST BP LT 130 MM HG: CPT | Performed by: FAMILY MEDICINE

## 2023-05-24 PROCEDURE — 99214 OFFICE O/P EST MOD 30 MIN: CPT | Performed by: FAMILY MEDICINE

## 2023-05-24 PROCEDURE — 3044F HG A1C LEVEL LT 7.0%: CPT | Performed by: FAMILY MEDICINE

## 2023-05-24 PROCEDURE — 3078F DIAST BP <80 MM HG: CPT | Performed by: FAMILY MEDICINE

## 2023-05-24 RX ORDER — SEMAGLUTIDE 1.34 MG/ML
1 INJECTION, SOLUTION SUBCUTANEOUS
Qty: 3 ML | Refills: 1 | Status: SHIPPED | OUTPATIENT
Start: 2023-05-24 | End: 2023-06-23

## 2023-05-24 RX ORDER — COVID-19 MOLECULAR TEST ASSAY
KIT MISCELLANEOUS
COMMUNITY
Start: 2023-04-12

## 2023-05-24 RX ORDER — SEMAGLUTIDE 2.4 MG/.75ML
2.4 INJECTION, SOLUTION SUBCUTANEOUS
Qty: 3 ML | Refills: 0 | Status: SHIPPED | OUTPATIENT
Start: 2023-05-24 | End: 2023-06-23

## 2023-05-24 ASSESSMENT — PATIENT HEALTH QUESTIONNAIRE - PHQ9
SUM OF ALL RESPONSES TO PHQ QUESTIONS 1-9: 0
SUM OF ALL RESPONSES TO PHQ9 QUESTIONS 1 & 2: 0
2. FEELING DOWN, DEPRESSED OR HOPELESS: 0
1. LITTLE INTEREST OR PLEASURE IN DOING THINGS: 0

## 2023-05-24 NOTE — PROGRESS NOTES
1. \"Have you been to the ER, urgent care clinic since your last visit? Hospitalized since your last visit? \" No    2. \"Have you seen or consulted any other health care providers outside of the [address] [place] [address] since your last visit? \"  no      3. For patients aged 39-70: Has the patient had a colonoscopy / FIT/ Cologuard? Yes - Care Gap present. Most recent result on file      If the patient is female:    4. For patients aged 41-77: Has the patient had a mammogram within the past 2 years? No      5. For patients aged 21-65: Has the patient had a pap smear?  NA - based on age or sex

## 2023-05-27 NOTE — PROGRESS NOTES
HPI:  Jose Major is a 62 y.o. female who presents today with   Chief Complaint   Patient presents with    Diabetes     8 week follow-up (Ozempic)      Patient is here to follow-up on diabetes. Patient was prescribed Ozempic to help control her diabetes as well as assist with weight loss at her last visit. She does not feel like the Ozempic has curbed her appetite. She has lost about 4 pounds. She additionally had some intolerance to the Ozempic. She developed nausea and headache. She inquires if trying medication long-term will cause the symptoms to subside. She is willing to continue to use medicine. She has had her blood work is here to be the same. Her A1c has improved.   Hemoglobin A1C   Date Value Ref Range Status   05/03/2023 6.6 (H) 4.8 - 5.6 % Final     Lab Results   Component Value Date    CHOL 150 05/03/2023    CHOL 180 08/11/2022    CHOL 169 03/25/2022     Lab Results   Component Value Date    TRIG 88 05/03/2023    TRIG 97 08/11/2022    TRIG 129 03/25/2022     Lab Results   Component Value Date    HDL 44 05/03/2023    HDL 49 08/11/2022    HDL 3.7 08/11/2022     Lab Results   Component Value Date    LDLCALC 88 05/03/2023    LDLCALC 112 (H) 08/11/2022    LDLCALC 102 (H) 03/25/2022     Lab Results   Component Value Date    LABVLDL 18 05/03/2023    LABVLDL 19 08/11/2022    LABVLDL 26 03/25/2022     Lab Results   Component Value Date    CHOLHDLRATIO 3.4 05/03/2023    CHOLHDLRATIO 3.6 07/15/2021    CHOLHDLRATIO 3.7 06/18/2020     Her cholesterol is stable    Lab Results   Component Value Date     05/03/2023    K 4.4 05/03/2023     05/03/2023    CO2 28 05/03/2023    BUN 20 05/03/2023    CREATININE 0.7 05/03/2023    GLUCOSE 125 (H) 05/03/2023    CALCIUM 9.8 05/03/2023    PROT 6.7 05/03/2023    LABALBU 4.5 05/03/2023    BILITOT 0.2 05/03/2023    ALKPHOS 76 05/03/2023    AST 13 05/03/2023    ALT 20 05/03/2023    LABGLOM >60.0 03/25/2022    GFRAA >60.0 04/05/2022    AGRATIO 2.0

## 2023-06-08 NOTE — PROGRESS NOTES
Patient: Madeline Tomlinson                MRN: [id number]       SSN: [id number]  YOB: 1965        AGE: 62 y.o. SEX: female    PCP: Nicki Carey MD  06/09/23    Chief Complaint   Patient presents with    Knee Pain     bilateral     HISTORY:  Madeline Tomlinson is a 62 y.o. female who is seen for increased bilateral knee pain. She does not recall any recent injury. She sustained an old injury 1/18/18  when she twisted her knee. She stepped off a curb into a pile of snow that they had pushed next to a walkway at [facility]. She states that at times she feels as though she is  a 80year old woman. She completed a successful Euflexxa series on 4/13/16. Occupation, etc:  Ms. Leda Ruiz works as a  for the [employer]. Her mother and  both had difficult experiences with the gastric bypass surgery. She lives in [place] with her  and son. Her son now attends [employer] and just got an internship for the summer. He enjoys fishing. Her daughter graduated from [employer] and is working part time at her [employer] while preparing to get  in 2024. She is right handed. She is hypertensive. She is not diabetic. She reports she has gained weight recently--to 295#. She is 5'3\". Wt Readings from Last 3 Encounters:   06/09/23 299 lb (135.6 kg)   05/24/23 300 lb (136.1 kg)   03/16/23 (!) 303 lb (137.4 kg)      Body mass index is 52.97 kg/m². Patient Active Problem List   Diagnosis    Obesity, morbid ([address]    Hypercholesterolemia    Vitamin D deficiency    Iron deficiency anemia    HTN (hypertension)    Sleep apnea    Knee pain, left    DDD (degenerative disc disease), cervical    Prediabetes       Social History     Tobacco Use    Smoking status: Never    Smokeless tobacco: Never   Vaping Use    Vaping Use: Never used   Substance Use Topics    Alcohol use: Yes     Comment: Occasional drinking    Drug use:  No

## 2023-06-09 ENCOUNTER — OFFICE VISIT (OUTPATIENT)
Age: 58
End: 2023-06-09

## 2023-06-09 VITALS — HEIGHT: 63 IN | BODY MASS INDEX: 51.91 KG/M2 | TEMPERATURE: 97.7 F | WEIGHT: 293 LBS

## 2023-06-09 DIAGNOSIS — M17.11 PRIMARY OSTEOARTHRITIS OF RIGHT KNEE: ICD-10-CM

## 2023-06-09 DIAGNOSIS — M17.12 PRIMARY OSTEOARTHRITIS OF LEFT KNEE: Primary | ICD-10-CM

## 2023-06-09 RX ORDER — BETAMETHASONE SODIUM PHOSPHATE AND BETAMETHASONE ACETATE 3; 3 MG/ML; MG/ML
3 INJECTION, SUSPENSION INTRA-ARTICULAR; INTRALESIONAL; INTRAMUSCULAR; SOFT TISSUE ONCE
Status: COMPLETED | OUTPATIENT
Start: 2023-06-09 | End: 2023-06-09

## 2023-06-09 RX ADMIN — BETAMETHASONE SODIUM PHOSPHATE AND BETAMETHASONE ACETATE 3 MG: 3; 3 INJECTION, SUSPENSION INTRA-ARTICULAR; INTRALESIONAL; INTRAMUSCULAR; SOFT TISSUE at 14:18

## 2023-06-09 RX ADMIN — BETAMETHASONE SODIUM PHOSPHATE AND BETAMETHASONE ACETATE 3 MG: 3; 3 INJECTION, SUSPENSION INTRA-ARTICULAR; INTRALESIONAL; INTRAMUSCULAR; SOFT TISSUE at 14:17

## 2023-06-23 ENCOUNTER — OFFICE VISIT (OUTPATIENT)
Age: 58
End: 2023-06-23

## 2023-06-23 VITALS — HEIGHT: 63 IN | BODY MASS INDEX: 51.91 KG/M2 | WEIGHT: 293 LBS | TEMPERATURE: 97.5 F

## 2023-06-23 DIAGNOSIS — M17.11 PRIMARY OSTEOARTHRITIS OF RIGHT KNEE: ICD-10-CM

## 2023-06-23 DIAGNOSIS — M17.12 PRIMARY OSTEOARTHRITIS OF LEFT KNEE: Primary | ICD-10-CM

## 2023-06-23 RX ORDER — HYALURONATE SODIUM 10 MG/ML
20 SYRINGE (ML) INTRAARTICULAR ONCE
Status: COMPLETED | OUTPATIENT
Start: 2023-06-23 | End: 2023-06-23

## 2023-06-23 RX ADMIN — Medication 20 MG: at 17:01

## 2023-06-23 NOTE — PROGRESS NOTES
Patient: Ivana Officer                MRN: [id number]       SSN: [id number]  YOB: 1965        AGE: 62 y.o. SEX: female  Body mass index is 52.97 kg/m². PCP: Thomas Trinh MD  06/23/23    Chief Complaint   Patient presents with    Injections     Euflexxa bilateral knees #2       HISTORY:  Ivana Officer is a 62 y.o. female who is seen for knee pain. ICD-10-CM    1. Primary osteoarthritis of left knee  M17.12 DRAIN/INJECT LARGE JOINT/BURSA     sodium hyaluronate (EUFLEXXA, HYALGAN) injection 20 mg      2. Primary osteoarthritis of right knee  M17.11 DRAIN/INJECT LARGE JOINT/BURSA     sodium hyaluronate (EUFLEXXA, HYALGAN) injection 20 mg          PROCEDURE:  Ms. Antoine Villavicencio knees were injected with 2 cc of Euflexxa. Chart reviewed for the following:   Shellie Kinney MD, have reviewed the History, Physical and updated the Allergic reactions for [address] [place] [address] [facility] performed immediately prior to start of procedure:  Shellie Kinney MD, have performed the following reviews on Ivana Officer prior to the start of the procedure:            * Patient was identified by name and date of birth   * Agreement on procedure being performed was verified  * Risks and Benefits explained to the patient  * Procedure site verified and marked as necessary  * Patient was positioned for comfort  * Consent was obtained     Time: 4:56 PM     Date of procedure: 6/23/2023    Procedure performed by:  Shelby Galvez MD    Ms. Singh Cervantes tolerated the procedure well with no complications. PLAN:  Ms. Singh Cervantes will follow up in one week to complete her visco supplementation injection series.

## 2023-06-30 ENCOUNTER — OFFICE VISIT (OUTPATIENT)
Age: 58
End: 2023-06-30

## 2023-06-30 VITALS — TEMPERATURE: 97.7 F | BODY MASS INDEX: 51.91 KG/M2 | WEIGHT: 293 LBS | HEIGHT: 63 IN

## 2023-06-30 DIAGNOSIS — M17.11 PRIMARY OSTEOARTHRITIS OF RIGHT KNEE: ICD-10-CM

## 2023-06-30 DIAGNOSIS — M17.12 PRIMARY OSTEOARTHRITIS OF LEFT KNEE: Primary | ICD-10-CM

## 2023-06-30 RX ORDER — HYALURONATE SODIUM 10 MG/ML
20 SYRINGE (ML) INTRAARTICULAR ONCE
Status: COMPLETED | OUTPATIENT
Start: 2023-06-30 | End: 2023-06-30

## 2023-06-30 RX ADMIN — Medication 20 MG: at 14:37

## 2023-07-14 NOTE — TELEPHONE ENCOUNTER
Last Visit: 05- OV   Next Appointment: 08-      Requested Prescriptions     Pending Prescriptions Disp Refills    OZEMPIC, 1 MG/DOSE, 4 MG/3ML SOPN [Pharmacy Med Name: Ozempic (1 MG/DOSE) 4 MG/3ML Subcutaneous Solution Pen-injector] 3 mL 0     Sig: INJECT 1 MG INTO THE SKIN EVERY 7 DAYS

## 2023-07-16 RX ORDER — SEMAGLUTIDE 1.34 MG/ML
1 INJECTION, SOLUTION SUBCUTANEOUS
Qty: 3 ML | Refills: 0 | Status: SHIPPED | OUTPATIENT
Start: 2023-07-16 | End: 2023-08-15

## 2023-08-11 RX ORDER — IBUPROFEN 800 MG/1
TABLET ORAL
Qty: 90 TABLET | Refills: 0 | Status: SHIPPED | OUTPATIENT
Start: 2023-08-11

## 2023-08-30 ENCOUNTER — OFFICE VISIT (OUTPATIENT)
Age: 58
End: 2023-08-30
Payer: COMMERCIAL

## 2023-08-30 VITALS
RESPIRATION RATE: 18 BRPM | HEART RATE: 66 BPM | BODY MASS INDEX: 51.28 KG/M2 | TEMPERATURE: 97.5 F | DIASTOLIC BLOOD PRESSURE: 83 MMHG | WEIGHT: 289.4 LBS | SYSTOLIC BLOOD PRESSURE: 133 MMHG | HEIGHT: 63 IN | OXYGEN SATURATION: 97 %

## 2023-08-30 DIAGNOSIS — E11.65 TYPE 2 DIABETES MELLITUS WITH HYPERGLYCEMIA, WITHOUT LONG-TERM CURRENT USE OF INSULIN (HCC): Primary | ICD-10-CM

## 2023-08-30 DIAGNOSIS — D50.8 OTHER IRON DEFICIENCY ANEMIA: ICD-10-CM

## 2023-08-30 DIAGNOSIS — E55.9 VITAMIN D DEFICIENCY: ICD-10-CM

## 2023-08-30 PROCEDURE — 3079F DIAST BP 80-89 MM HG: CPT | Performed by: FAMILY MEDICINE

## 2023-08-30 PROCEDURE — 3075F SYST BP GE 130 - 139MM HG: CPT | Performed by: FAMILY MEDICINE

## 2023-08-30 PROCEDURE — 3044F HG A1C LEVEL LT 7.0%: CPT | Performed by: FAMILY MEDICINE

## 2023-08-30 PROCEDURE — 99214 OFFICE O/P EST MOD 30 MIN: CPT | Performed by: FAMILY MEDICINE

## 2023-08-30 RX ORDER — OMEPRAZOLE 40 MG/1
40 CAPSULE, DELAYED RELEASE ORAL DAILY
Qty: 90 CAPSULE | Refills: 3 | Status: SHIPPED | OUTPATIENT
Start: 2023-08-30

## 2023-08-30 RX ORDER — LOSARTAN POTASSIUM AND HYDROCHLOROTHIAZIDE 12.5; 1 MG/1; MG/1
TABLET ORAL
Qty: 90 TABLET | Refills: 3 | Status: SHIPPED | OUTPATIENT
Start: 2023-08-30

## 2023-08-30 RX ORDER — SIMVASTATIN 10 MG
10 TABLET ORAL NIGHTLY
Qty: 90 TABLET | Refills: 3 | Status: SHIPPED | OUTPATIENT
Start: 2023-08-30

## 2023-08-30 RX ORDER — AMLODIPINE BESYLATE 5 MG/1
TABLET ORAL
Qty: 90 TABLET | Refills: 3 | Status: SHIPPED | OUTPATIENT
Start: 2023-08-30

## 2023-08-30 NOTE — PROGRESS NOTES
1. \"Have you been to the ER, urgent care clinic since your last visit? Hospitalized since your last visit? \" No    2. \"Have you seen or consulted any other health care providers outside of the [address] [place] [address] since your last visit? \" No     3. For patients aged 43-73: Has the patient had a colonoscopy / FIT/ Cologuard? Yes - Care Gap present. Most recent result on file      If the patient is female:    4. For patients aged 43-66: Has the patient had a mammogram within the past 2 years? Yes - Care Gap present. Most recent result on file      5. For patients aged 21-65: Has the patient had a pap smear?  NA - based on age or sex
symmetrical, trachea midline, no adenopathy, thyroid: not enlarged, symmetric, no tenderness/mass/nodules, no carotid bruit and no JVD  Lungs: clear to auscultation bilaterally  Heart: regular rate and rhythm, S1, S2 normal, no murmur, click, rub or gallop    Extremities: extremities normal, atraumatic, no cyanosis or edema    Assessment/Plan:  Adam Hale was seen today for diabetes. Diagnoses and all orders for this visit:    Type 2 diabetes mellitus with hyperglycemia, without long-term current use of insulin (HCC)  -     Comprehensive Metabolic Panel; Future  -     Hemoglobin A1C; Future  -     Lipid Panel; Future  -     Microalbumin / Creatinine Urine Ratio; Future    Other iron deficiency anemia  -     CBC with Auto Differential; Future    Vitamin D deficiency  -     Vitamin D 25 Hydroxy; Future    Other orders  -     omeprazole (PRILOSEC) 40 MG delayed release capsule; Take 1 capsule by mouth daily  -     amLODIPine (NORVASC) 5 MG tablet; Take 1 tablet by mouth once daily  -     losartan-hydroCHLOROthiazide (HYZAAR) 100-12.5 MG per tablet; Take 1 tablet by mouth once daily  -     simvastatin (ZOCOR) 10 MG tablet; Take 1 tablet by mouth nightly      As above  Pt stable  Congratulated on karen loss efforts    Refilled meds to new pharmacy  Labs ordered for next visit  Foot exam and urine micro albumin a next visit  Return in about 4 months (around 12/30/2023) for diabetes. This has been fully explained to the patient, who indicates understanding. [facility] and pt has been advised of same.          Usha Villanueva MD

## 2023-11-02 ENCOUNTER — OFFICE VISIT (OUTPATIENT)
Age: 58
End: 2023-11-02

## 2023-11-02 VITALS — BODY MASS INDEX: 52.26 KG/M2 | WEIGHT: 293 LBS

## 2023-11-02 DIAGNOSIS — M65.319 STENOSING TENOSYNOVITIS OF THUMB: Primary | ICD-10-CM

## 2023-11-02 DIAGNOSIS — M65.312 TRIGGER THUMB OF LEFT HAND: ICD-10-CM

## 2023-11-02 RX ORDER — BETAMETHASONE SODIUM PHOSPHATE AND BETAMETHASONE ACETATE 3; 3 MG/ML; MG/ML
3 INJECTION, SUSPENSION INTRA-ARTICULAR; INTRALESIONAL; INTRAMUSCULAR; SOFT TISSUE ONCE
Status: COMPLETED | OUTPATIENT
Start: 2023-11-02 | End: 2023-11-02

## 2023-11-02 RX ADMIN — BETAMETHASONE SODIUM PHOSPHATE AND BETAMETHASONE ACETATE 3 MG: 3; 3 INJECTION, SUSPENSION INTRA-ARTICULAR; INTRALESIONAL; INTRAMUSCULAR; SOFT TISSUE at 10:23

## 2023-11-21 RX ORDER — IBUPROFEN 800 MG/1
800 TABLET ORAL EVERY 8 HOURS PRN
Qty: 90 TABLET | Refills: 0 | Status: SHIPPED | OUTPATIENT
Start: 2023-11-21

## 2023-12-28 LAB
A/G RATIO: 1.7 RATIO (ref 1.1–2.6)
ALBUMIN SERPL-MCNC: 4.2 G/DL (ref 3.5–5)
ALP BLD-CCNC: 80 U/L (ref 25–115)
ALT SERPL-CCNC: 12 U/L (ref 5–40)
ANION GAP SERPL CALCULATED.3IONS-SCNC: 9 MMOL/L (ref 3–15)
AST SERPL-CCNC: 9 U/L (ref 10–37)
AVERAGE GLUCOSE: 146 MG/DL (ref 91–123)
BASOPHILS # BLD: 1 % (ref 0–2)
BASOPHILS ABSOLUTE: 0 K/UL (ref 0–0.2)
BILIRUB SERPL-MCNC: 0.1 MG/DL (ref 0.2–1.2)
BUN BLDV-MCNC: 22 MG/DL (ref 6–22)
CALCIUM SERPL-MCNC: 9.6 MG/DL (ref 8.4–10.5)
CHLORIDE BLD-SCNC: 105 MMOL/L (ref 98–110)
CHOLESTEROL/HDL RATIO: 3.9 (ref 0–5)
CHOLESTEROL: 173 MG/DL (ref 110–200)
CO2: 28 MMOL/L (ref 20–32)
CREAT SERPL-MCNC: 0.6 MG/DL (ref 0.5–1.2)
CREATININE URINE: 146 MG/DL
EOSINOPHIL # BLD: 3 % (ref 0–6)
EOSINOPHILS ABSOLUTE: 0.2 K/UL (ref 0–0.5)
GLOBULIN: 2.5 G/DL (ref 2–4)
GLOMERULAR FILTRATION RATE: >60 ML/MIN/1.73 SQ.M.
GLUCOSE: 142 MG/DL (ref 70–99)
HBA1C MFR BLD: 6.7 % (ref 4.8–5.6)
HCT VFR BLD CALC: 39.1 % (ref 35.1–48)
HDLC SERPL-MCNC: 44 MG/DL
HEMOGLOBIN: 11.3 G/DL (ref 11.7–16)
LDL CHOLESTEROL CALCULATED: 97 MG/DL (ref 50–99)
LDL/HDL RATIO: 2.2
LYMPHOCYTES # BLD: 34 % (ref 20–45)
LYMPHOCYTES ABSOLUTE: 2.4 K/UL (ref 1–4.8)
MCH RBC QN AUTO: 26 PG (ref 26–34)
MCHC RBC AUTO-ENTMCNC: 29 G/DL (ref 31–36)
MCV RBC AUTO: 89 FL (ref 80–99)
MICROALB/CREAT RATIO (UG/MG CREAT.): 8.6 (ref 0–30)
MICROALBUMIN/CREAT 24H UR: 12.5 MG/L (ref 0.1–17)
MONOCYTES ABSOLUTE: 0.4 K/UL (ref 0.1–1)
MONOCYTES: 5 % (ref 3–12)
NEUTROPHILS ABSOLUTE: 4.1 K/UL (ref 1.8–7.7)
NEUTROPHILS: 57 % (ref 40–75)
NON-HDL CHOLESTEROL: 129 MG/DL
PDW BLD-RTO: 16.3 % (ref 10–15.5)
PLATELET # BLD: 292 K/UL (ref 140–440)
PMV BLD AUTO: 11.4 FL (ref 9–13)
POTASSIUM SERPL-SCNC: 4 MMOL/L (ref 3.5–5.5)
RBC: 4.41 M/UL (ref 3.8–5.2)
SODIUM BLD-SCNC: 142 MMOL/L (ref 133–145)
TOTAL PROTEIN: 6.7 G/DL (ref 6.4–8.3)
TRIGL SERPL-MCNC: 160 MG/DL (ref 40–149)
VITAMIN D 25-HYDROXY: 56.9 NG/ML (ref 32–100)
VLDLC SERPL CALC-MCNC: 32 MG/DL (ref 8–30)
WBC: 7.2 K/UL (ref 4–11)

## 2024-01-03 ENCOUNTER — OFFICE VISIT (OUTPATIENT)
Age: 59
End: 2024-01-03
Payer: COMMERCIAL

## 2024-01-03 VITALS
RESPIRATION RATE: 18 BRPM | OXYGEN SATURATION: 94 % | SYSTOLIC BLOOD PRESSURE: 133 MMHG | TEMPERATURE: 97.3 F | HEIGHT: 63 IN | BODY MASS INDEX: 51.91 KG/M2 | HEART RATE: 74 BPM | WEIGHT: 293 LBS | DIASTOLIC BLOOD PRESSURE: 82 MMHG

## 2024-01-03 DIAGNOSIS — E55.9 VITAMIN D DEFICIENCY, UNSPECIFIED: ICD-10-CM

## 2024-01-03 DIAGNOSIS — D50.8 OTHER IRON DEFICIENCY ANEMIA: ICD-10-CM

## 2024-01-03 DIAGNOSIS — E11.65 TYPE 2 DIABETES MELLITUS WITH HYPERGLYCEMIA, WITHOUT LONG-TERM CURRENT USE OF INSULIN (HCC): Primary | ICD-10-CM

## 2024-01-03 PROCEDURE — 99214 OFFICE O/P EST MOD 30 MIN: CPT | Performed by: FAMILY MEDICINE

## 2024-01-03 PROCEDURE — 3079F DIAST BP 80-89 MM HG: CPT | Performed by: FAMILY MEDICINE

## 2024-01-03 PROCEDURE — 3075F SYST BP GE 130 - 139MM HG: CPT | Performed by: FAMILY MEDICINE

## 2024-01-03 ASSESSMENT — ANXIETY QUESTIONNAIRES
5. BEING SO RESTLESS THAT IT IS HARD TO SIT STILL: 0
3. WORRYING TOO MUCH ABOUT DIFFERENT THINGS: 0
IF YOU CHECKED OFF ANY PROBLEMS ON THIS QUESTIONNAIRE, HOW DIFFICULT HAVE THESE PROBLEMS MADE IT FOR YOU TO DO YOUR WORK, TAKE CARE OF THINGS AT HOME, OR GET ALONG WITH OTHER PEOPLE: NOT DIFFICULT AT ALL
GAD7 TOTAL SCORE: 0
6. BECOMING EASILY ANNOYED OR IRRITABLE: 0
1. FEELING NERVOUS, ANXIOUS, OR ON EDGE: 0
4. TROUBLE RELAXING: 0
2. NOT BEING ABLE TO STOP OR CONTROL WORRYING: 0
7. FEELING AFRAID AS IF SOMETHING AWFUL MIGHT HAPPEN: 0

## 2024-01-03 ASSESSMENT — PATIENT HEALTH QUESTIONNAIRE - PHQ9
SUM OF ALL RESPONSES TO PHQ QUESTIONS 1-9: 0
2. FEELING DOWN, DEPRESSED OR HOPELESS: 0
SUM OF ALL RESPONSES TO PHQ QUESTIONS 1-9: 0
1. LITTLE INTEREST OR PLEASURE IN DOING THINGS: 0
SUM OF ALL RESPONSES TO PHQ9 QUESTIONS 1 & 2: 0

## 2024-01-03 NOTE — PROGRESS NOTES
1. \"Have you been to the ER, urgent care clinic since your last visit?  Hospitalized since your last visit?\" No    2. \"Have you seen or consulted any other health care providers outside of the [facility] since your last visit?\" No     3. For patients aged 45-75: Has the patient had a colonoscopy / FIT/ Cologuard? Yes - Care Gap present. Most recent result on file      If the patient is female:    4. For patients aged 40-74: Has the patient had a mammogram within the past 2 years? NA - based on age or sex      5. For patients aged 21-65: Has the patient had a pap smear? Yes - Care Gap present. Rooming MA/LPN to request most recent results

## 2024-01-03 NOTE — PROGRESS NOTES
HPI:  Charisse Linares is a 58 y.o. female who presents today with   Chief Complaint   Patient presents with    Diabetes     4 month follow-up    Discuss Labs        Patient's BP is stable; Pt is on medications as listed below and  tolerates medications well  Patient also has DM.  Pt is on medications for this. Pt  tolerates her prescribed medications and is compliant with the medications  Last labs are as listed below.  These have been reviewed with the patient.    Patient is considering Nutrisystem for her weight loss efforts.  She also plans to exercise more.  She was intolerant to semaglutide.    Patient also has vitamin D deficiency.        Wt Readings from Last 3 Encounters:   01/03/24 134.6 kg (296 lb 12.8 oz)   11/02/23 133.8 kg (295 lb)   08/30/23 131.3 kg (289 lb 6.4 oz)     Lab Results   Component Value Date     12/27/2023    K 4.0 12/27/2023     12/27/2023    CO2 28 12/27/2023    BUN 22 12/27/2023    CREATININE 0.6 12/27/2023    GLUCOSE 142 (H) 12/27/2023    CALCIUM 9.6 12/27/2023    PROT 6.7 12/27/2023    LABALBU 4.2 12/27/2023    BILITOT 0.1 (L) 12/27/2023    ALKPHOS 80 12/27/2023    AST 9 (L) 12/27/2023    ALT 12 12/27/2023    LABGLOM >60.0 03/25/2022    GFRAA >60.0 04/05/2022    AGRATIO 1.7 12/27/2023    GLOB 2.5 12/27/2023       Lab Results   Component Value Date    CHOL 173 12/27/2023    TRIG 160 (H) 12/27/2023    HDL 44 12/27/2023    LDLCALC 97 12/27/2023    LABVLDL 32 (H) 12/27/2023    CHOLHDLRATIO 3.9 12/27/2023     Hemoglobin A1C   Date Value Ref Range Status   12/27/2023 6.7 (H) 4.8 - 5.6 % Final                No data to display                        PMH,  Meds, Allergies, Family History, Social history reviewed      Current Outpatient Medications   Medication Sig Dispense Refill    ibuprofen (ADVIL;MOTRIN) 800 MG tablet Take 1 tablet by mouth every 8 hours as needed for Pain 90 tablet 0    Ferrous Sulfate Dried (FERROUS SULFATE CR PO)       omeprazole (PRILOSEC) 40 MG delayed

## 2024-01-11 NOTE — PROGRESS NOTES
Patient: Charisse Linares                MRN: [id number]       SSN: [id number]  YOB: 1965        AGE: 58 y.o.        SEX: female      PCP: Yolanda Youngblood MD  01/15/24    Chief Complaint   Patient presents with    Knee Pain     BIlateral     HISTORY:  Charisse Linares is a 58 y.o. female who is seen for increased bilateral knee pain (L>R). Her left knee pain is mostly medial. She denies any recent injury.  She feels pain with standing, walking and stair climbing.  She experiences startup pain after sitting. She experiences pain mostly when she is getting up and sitting down.    She was previously seen for left thumb pain--doing better now. She denies any recent catching and popping but notes the A1 pulley pain has been worsening. She cannot open her hand sometimes wakes up in the morning. She notes using the keyboard a lot at work she wonders if the repetitive activities have caused her problem. She notes a tender lump at the volar base of her thumb.      She was previously seen for bilateral knee pain. She successfully completed a bilateral Euflexxa series on 6/30/23.      Occupation, etc: Ms. Linares works as a  for the [employer]. She uses a keyboard at work. Her mother and  both had difficult experiences with the gastric bypass surgery which makes her hesitant to have the surgery herself.  She lives in [place] with her  and son.  Her son now attends [employer] and has an internship with the [employer]. He enjoys fishing. Her daughter graduated from [employer] and is working part time at her [employer] while preparing to get  in 2024.  She is right handed. She is hypertensive. She is prediabetic. She reports she has gained weight recently--to 295#. She is 5'3\". She has tried Ozempic in the past but stopped due to experiencing side affects.  She is now starting Nutrisystem to try to lose weight.   Hemoglobin A1C   Date Value Ref

## 2024-01-15 ENCOUNTER — OFFICE VISIT (OUTPATIENT)
Age: 59
End: 2024-01-15
Payer: COMMERCIAL

## 2024-01-15 VITALS — BODY MASS INDEX: 51.91 KG/M2 | WEIGHT: 293 LBS | TEMPERATURE: 98.7 F | HEIGHT: 63 IN

## 2024-01-15 DIAGNOSIS — M17.12 UNILATERAL PRIMARY OSTEOARTHRITIS, LEFT KNEE: ICD-10-CM

## 2024-01-15 DIAGNOSIS — G89.29 CHRONIC PAIN OF LEFT KNEE: ICD-10-CM

## 2024-01-15 DIAGNOSIS — M25.561 RIGHT KNEE PAIN, UNSPECIFIED CHRONICITY: Primary | ICD-10-CM

## 2024-01-15 DIAGNOSIS — M17.11 UNILATERAL PRIMARY OSTEOARTHRITIS, RIGHT KNEE: ICD-10-CM

## 2024-01-15 DIAGNOSIS — M25.562 CHRONIC PAIN OF LEFT KNEE: ICD-10-CM

## 2024-01-15 PROCEDURE — 73562 X-RAY EXAM OF KNEE 3: CPT | Performed by: SPECIALIST

## 2024-01-15 PROCEDURE — 20610 DRAIN/INJ JOINT/BURSA W/O US: CPT | Performed by: SPECIALIST

## 2024-01-15 PROCEDURE — 99213 OFFICE O/P EST LOW 20 MIN: CPT | Performed by: SPECIALIST

## 2024-01-15 RX ORDER — BUPIVACAINE HYDROCHLORIDE 5 MG/ML
4 INJECTION, SOLUTION PERINEURAL ONCE
Status: COMPLETED | OUTPATIENT
Start: 2024-01-15 | End: 2024-01-15

## 2024-01-15 RX ORDER — BETAMETHASONE SODIUM PHOSPHATE AND BETAMETHASONE ACETATE 3; 3 MG/ML; MG/ML
3 INJECTION, SUSPENSION INTRA-ARTICULAR; INTRALESIONAL; INTRAMUSCULAR; SOFT TISSUE ONCE
Status: COMPLETED | OUTPATIENT
Start: 2024-01-15 | End: 2024-01-15

## 2024-01-15 RX ADMIN — BUPIVACAINE HYDROCHLORIDE 20 MG: 5 INJECTION, SOLUTION PERINEURAL at 11:21

## 2024-01-15 RX ADMIN — BUPIVACAINE HYDROCHLORIDE 20 MG: 5 INJECTION, SOLUTION PERINEURAL at 11:22

## 2024-01-15 RX ADMIN — BETAMETHASONE SODIUM PHOSPHATE AND BETAMETHASONE ACETATE 3 MG: 3; 3 INJECTION, SUSPENSION INTRA-ARTICULAR; INTRALESIONAL; INTRAMUSCULAR; SOFT TISSUE at 11:19

## 2024-01-15 RX ADMIN — BETAMETHASONE SODIUM PHOSPHATE AND BETAMETHASONE ACETATE 3 MG: 3; 3 INJECTION, SUSPENSION INTRA-ARTICULAR; INTRALESIONAL; INTRAMUSCULAR; SOFT TISSUE at 11:20

## 2024-02-19 NOTE — PROGRESS NOTES
Patient: Charisse Linares                MRN: [id number]       SSN: [id number]  YOB: 1965        AGE: 58 y.o.        SEX: female      PCP: Yolanda Youngblood MD  02/23/24    Chief Complaint   Patient presents with    Knee Pain     BILATERAL       HISTORY:  Charisse Linares is a 58 y.o. female who is seen for bilateral knee pain. She presents today for her Synvisc-One injection.     She was previously seen for increased bilateral knee pain (L>R). Her left knee pain is mostly medial. She denies any recent injury.  She feels pain with standing, walking and stair climbing.  She experiences startup pain after sitting. She experiences pain mostly when she is getting up and sitting down.     She was previously seen for left thumb pain--doing better now. She denies any recent catching and popping but notes the A1 pulley pain has been worsening. She cannot open her hand sometimes wakes up in the morning. She notes using the keyboard a lot at work she wonders if the repetitive activities have caused her problem. She notes a tender lump at the volar base of her thumb.      She was previously seen for bilateral knee pain. She successfully completed a bilateral Euflexxa series on 6/30/23.       Occupation, etc: Ms. Linares works as a  for the [employer]. She uses a keyboard at work. Her mother and  both had difficult experiences with the gastric bypass surgery which makes her hesitant to have the surgery herself.  She lives in [place] with her  and son.  Her son now attends [employer] and has an internship with the [employer]. He enjoys fishing. Her daughter graduated from [employer] and is working part time at her [employer] while preparing to get  in 2024.  She is right handed. She is hypertensive. She is prediabetic. She reports she has gained weight recently--to 295#. She is 5'3\". She has tried Ozempic in the past but stopped due to

## 2024-02-23 ENCOUNTER — OFFICE VISIT (OUTPATIENT)
Age: 59
End: 2024-02-23

## 2024-02-23 VITALS — HEIGHT: 63 IN | WEIGHT: 293 LBS | BODY MASS INDEX: 51.91 KG/M2 | TEMPERATURE: 97.3 F

## 2024-02-23 DIAGNOSIS — M17.12 UNILATERAL PRIMARY OSTEOARTHRITIS, LEFT KNEE: Primary | ICD-10-CM

## 2024-02-23 DIAGNOSIS — M17.11 UNILATERAL PRIMARY OSTEOARTHRITIS, RIGHT KNEE: ICD-10-CM

## 2024-03-29 NOTE — PROGRESS NOTES
53.50 kg/m²      ORTHO EXAMINATION:  Examination Right knee Left knee   Skin Intact Intact   Range of motion 100-0 100-0   Effusion - -   Medial joint line tenderness + +   Lateral joint line tenderness - -   Popliteal tenderness - -   Osteophytes palpable + +   Kristal’s - -   Patella crepitus + +   Anterior drawer - -   Lateral laxity - -   Medial laxity - -   Varus deformity - -   Valgus deformity - -   Pretibial edema - -   Calf tenderness - -      Examination Right Hand Left Hand   Skin Intact Intact   Deformity - -   Swelling - -   Tenderness - -   Finger flexion Full Full   Finger extension Full Full   Sensation Normal Normal   Capillary refill Normal Normal   Heberden's nodes - -   Dupuytren's - -      MRI LEFT KNEE WO CONT 9/21/16  Impression:  Focally macerated posterior horn of the medial meniscus between the meniscal root and meniscal body, probably near complete tear, with some foreshortening and thickening of the meniscal root evident.  Joint effusion. Slitlike Baker's cyst.  Mild degenerative change of the medial joint space.      RADIOGRAPHS:   01/15/24  3 VIEWS BILAT KNEE NAVEED  Three views of bilateral knee: no fractures, no effusion, Kellgren Nik grade 2 with moderately severe joint space narrowing, no osteophytes present.     XR BILATERAL SHOULDER 3/20/18  IMPRESSION:  Three views - No fractures, no acromioclavicular narrowing, no glenohumeral narrowing, no calcific densities.      XR LEFT ANKLE 3/20/18  IMPRESSION:  Three views - No fractures, no degenerative changes.      XR KNEE PREVIOUS  Previous x-rays show moderate medial joint space narrowing. No fractures.    PROCEDURE: Patient's knees injected 4 cc 0.25% Marcaine and 0.5 cc Celestone.  Chart reviewed for the following:   Lakhwinder SANTANA MD, have reviewed the History, Physical and updated the Allergic reactions for Charisse Linares     TIME OUT performed immediately prior to start of procedure:  Lakhwinder SANTANA MD, have

## 2024-04-04 ENCOUNTER — OFFICE VISIT (OUTPATIENT)
Age: 59
End: 2024-04-04
Payer: COMMERCIAL

## 2024-04-04 VITALS — HEIGHT: 63 IN | WEIGHT: 293 LBS | TEMPERATURE: 97.7 F | BODY MASS INDEX: 51.91 KG/M2

## 2024-04-04 DIAGNOSIS — M25.562 CHRONIC PAIN OF LEFT KNEE: ICD-10-CM

## 2024-04-04 DIAGNOSIS — M17.11 UNILATERAL PRIMARY OSTEOARTHRITIS, RIGHT KNEE: ICD-10-CM

## 2024-04-04 DIAGNOSIS — M17.12 UNILATERAL PRIMARY OSTEOARTHRITIS, LEFT KNEE: Primary | ICD-10-CM

## 2024-04-04 DIAGNOSIS — G89.29 CHRONIC PAIN OF LEFT KNEE: ICD-10-CM

## 2024-04-04 DIAGNOSIS — M25.561 RIGHT KNEE PAIN, UNSPECIFIED CHRONICITY: ICD-10-CM

## 2024-04-04 PROCEDURE — 20610 DRAIN/INJ JOINT/BURSA W/O US: CPT | Performed by: SPECIALIST

## 2024-04-04 PROCEDURE — 99213 OFFICE O/P EST LOW 20 MIN: CPT | Performed by: SPECIALIST

## 2024-04-04 RX ORDER — BETAMETHASONE SODIUM PHOSPHATE AND BETAMETHASONE ACETATE 3; 3 MG/ML; MG/ML
3 INJECTION, SUSPENSION INTRA-ARTICULAR; INTRALESIONAL; INTRAMUSCULAR; SOFT TISSUE ONCE
Status: COMPLETED | OUTPATIENT
Start: 2024-04-04 | End: 2024-04-04

## 2024-04-04 RX ORDER — BUPIVACAINE HYDROCHLORIDE 5 MG/ML
4 INJECTION, SOLUTION PERINEURAL ONCE
Status: COMPLETED | OUTPATIENT
Start: 2024-04-04 | End: 2024-04-04

## 2024-04-04 RX ADMIN — BUPIVACAINE HYDROCHLORIDE 20 MG: 5 INJECTION, SOLUTION PERINEURAL at 14:39

## 2024-04-04 RX ADMIN — BETAMETHASONE SODIUM PHOSPHATE AND BETAMETHASONE ACETATE 3 MG: 3; 3 INJECTION, SUSPENSION INTRA-ARTICULAR; INTRALESIONAL; INTRAMUSCULAR; SOFT TISSUE at 14:39

## 2024-04-04 RX ADMIN — BUPIVACAINE HYDROCHLORIDE 20 MG: 5 INJECTION, SOLUTION PERINEURAL at 14:40

## 2024-04-04 RX ADMIN — BETAMETHASONE SODIUM PHOSPHATE AND BETAMETHASONE ACETATE 3 MG: 3; 3 INJECTION, SUSPENSION INTRA-ARTICULAR; INTRALESIONAL; INTRAMUSCULAR; SOFT TISSUE at 14:38

## 2024-04-24 ENCOUNTER — TRANSCRIBE ORDERS (OUTPATIENT)
Facility: HOSPITAL | Age: 59
End: 2024-04-24

## 2024-04-24 DIAGNOSIS — Z12.31 VISIT FOR SCREENING MAMMOGRAM: Primary | ICD-10-CM

## 2024-05-13 RX ORDER — IBUPROFEN 800 MG/1
800 TABLET ORAL EVERY 8 HOURS PRN
Qty: 90 TABLET | Refills: 0 | Status: SHIPPED | OUTPATIENT
Start: 2024-05-13

## 2024-05-14 ENCOUNTER — HOSPITAL ENCOUNTER (OUTPATIENT)
Facility: HOSPITAL | Age: 59
Discharge: HOME | End: 2024-05-16
Attending: FAMILY MEDICINE
Payer: COMMERCIAL

## 2024-05-14 DIAGNOSIS — M79.662 PAIN OF LEFT CALF: ICD-10-CM

## 2024-05-14 PROCEDURE — 93971 EXTREMITY STUDY: CPT

## 2024-05-14 SDOH — HEALTH STABILITY: PHYSICAL HEALTH: ON AVERAGE, HOW MANY MINUTES DO YOU ENGAGE IN EXERCISE AT THIS LEVEL?: 0 MIN

## 2024-05-14 SDOH — HEALTH STABILITY: PHYSICAL HEALTH: ON AVERAGE, HOW MANY DAYS PER WEEK DO YOU ENGAGE IN MODERATE TO STRENUOUS EXERCISE (LIKE A BRISK WALK)?: 0 DAYS

## 2024-05-17 ENCOUNTER — OFFICE VISIT (OUTPATIENT)
Age: 59
End: 2024-05-17
Payer: COMMERCIAL

## 2024-05-17 DIAGNOSIS — M17.12 OSTEOARTHRITIS OF LEFT KNEE, UNSPECIFIED OSTEOARTHRITIS TYPE: Primary | ICD-10-CM

## 2024-05-17 DIAGNOSIS — M17.11 OSTEOARTHRITIS OF RIGHT KNEE, UNSPECIFIED OSTEOARTHRITIS TYPE: ICD-10-CM

## 2024-05-17 PROCEDURE — 99203 OFFICE O/P NEW LOW 30 MIN: CPT | Performed by: ORTHOPAEDIC SURGERY

## 2024-05-17 NOTE — PROGRESS NOTES
Name: Charisse Linares    : 1965     [facility] [address]  [address], [address]  [facility] [address]  Dept: [phone]  Dept Fax: [phone]     Chief Complaint   Patient presents with    Knee Pain        LMP 2022 (Exact Date)      Allergies   Allergen Reactions    Lisinopril Cough and Other (See Comments)        Current Outpatient Medications   Medication Sig Dispense Refill    ibuprofen (ADVIL;MOTRIN) 800 MG tablet TAKE 1 TABLET BY MOUTH EVERY 8 HOURS AS NEEDED FOR PAIN 90 tablet 0    Semaglutide (RYBELSUS) 3 MG TABS Take 3 mg by mouth daily DX:  E 11.6 30 tablet 0    [START ON 2024] Semaglutide 7 MG TABS Take 7 mg by mouth daily DX: E11.6 30 tablet 2    omeprazole (PRILOSEC) 40 MG delayed release capsule Take 1 capsule by mouth daily 90 capsule 3    amLODIPine (NORVASC) 5 MG tablet Take 1 tablet by mouth once daily 90 tablet 3    losartan-hydroCHLOROthiazide (HYZAAR) 100-12.5 MG per tablet Take 1 tablet by mouth once daily 90 tablet 3    simvastatin (ZOCOR) 10 MG tablet Take 1 tablet by mouth nightly 90 tablet 3    BINAXNOW COVID-19 AG HOME TEST KIT TEST AS DIRECTED TODAY (Patient not taking: Reported on 2024)      vitamin D3 (CHOLECALCIFEROL) 125 MCG (5000 UT) TABS tablet Take 1 tablet by mouth daily       No current facility-administered medications for this visit.      Patient Active Problem List   Diagnosis    Obesity, morbid (HCC)    Hypercholesterolemia    Vitamin D deficiency    Iron deficiency anemia    HTN (hypertension)    Sleep apnea    Knee pain, left    DDD (degenerative disc disease), cervical    Prediabetes      Family History   Problem Relation Age of Onset    Breast Cancer Maternal Grandmother     Cancer Maternal Grandmother         Breast Cancer    Diabetes Maternal Uncle     Hypertension Mother     Cancer Mother         Uterine Cancer    Diabetes Mother     Heart Attack Maternal

## 2024-07-17 ENCOUNTER — CLINICAL DOCUMENTATION (OUTPATIENT)
Facility: CLINIC | Age: 59
End: 2024-07-17

## 2024-07-17 NOTE — PROGRESS NOTES
Closed  PA Detail   Other Case ID: [id number]      Payer: [employer]   This PA has been closed and marked as approved in the CoverMyMeds Portal. Please contact the pharmacy to confirm the coverage status.   Prior auth initiated by: [facility] [address] [place] [address] [address] - P [phone] - F [phone]    [phone]   View History     Medication Being Authorized     Semaglutide (RYBELSUS) 3 MG TABS ()    Take 3 mg by mouth daily DX:  E 11.6    Dispense: 30 tablet Refills: 0     Start: 2024 End: 2024     Class: Normal      This order has been released to its destination.   To be filled at: [facility] [address] [place] [address] [address] - P [phone] - F [phone]        Pharmacy Benefits    No primary plan selected   Other Plans

## 2024-08-03 LAB
A/G RATIO: 1.8 RATIO (ref 1.1–2.6)
ALBUMIN: 4.4 G/DL (ref 3.5–5)
ALP BLD-CCNC: 80 U/L (ref 25–115)
ALT SERPL-CCNC: 11 U/L (ref 5–40)
ANION GAP SERPL CALCULATED.3IONS-SCNC: 11 MMOL/L (ref 3–15)
AST SERPL-CCNC: 11 U/L (ref 10–37)
BILIRUB SERPL-MCNC: 0.2 MG/DL (ref 0.2–1.2)
BUN BLDV-MCNC: 19 MG/DL (ref 6–22)
CALCIUM SERPL-MCNC: 9.6 MG/DL (ref 8.4–10.5)
CHLORIDE BLD-SCNC: 103 MMOL/L (ref 98–110)
CHOLESTEROL, TOTAL: 142 MG/DL (ref 110–200)
CHOLESTEROL/HDL RATIO: 4.2 (ref 0–5)
CO2: 27 MMOL/L (ref 20–32)
CREAT SERPL-MCNC: 0.6 MG/DL (ref 0.5–1.2)
ESTIMATED AVERAGE GLUCOSE: 140 MG/DL (ref 91–123)
GFR, ESTIMATED: >60 ML/MIN/1.73 SQ.M.
GLOBULIN: 2.4 G/DL (ref 2–4)
GLUCOSE: 107 MG/DL (ref 70–99)
HBA1C MFR BLD: 6.5 % (ref 4.8–5.6)
HDLC SERPL-MCNC: 34 MG/DL
LDL CHOLESTEROL: 84 MG/DL (ref 50–99)
LDL/HDL RATIO: 2.5
NON-HDL CHOLESTEROL: 108 MG/DL
POTASSIUM SERPL-SCNC: 4.2 MMOL/L (ref 3.5–5.5)
SODIUM BLD-SCNC: 141 MMOL/L (ref 133–145)
TOTAL PROTEIN: 6.8 G/DL (ref 6.4–8.3)
TRIGL SERPL-MCNC: 119 MG/DL (ref 40–149)
TSH SERPL DL<=0.05 MIU/L-ACNC: 0.8 MCU/ML (ref 0.27–4.2)
VITAMIN D 25-HYDROXY: 56 NG/ML (ref 32–100)
VLDLC SERPL CALC-MCNC: 24 MG/DL (ref 8–30)

## 2024-08-07 ENCOUNTER — OFFICE VISIT (OUTPATIENT)
Facility: CLINIC | Age: 59
End: 2024-08-07
Payer: COMMERCIAL

## 2024-08-07 VITALS
OXYGEN SATURATION: 96 % | DIASTOLIC BLOOD PRESSURE: 77 MMHG | RESPIRATION RATE: 18 BRPM | TEMPERATURE: 98 F | HEIGHT: 63 IN | SYSTOLIC BLOOD PRESSURE: 119 MMHG | BODY MASS INDEX: 51.77 KG/M2 | HEART RATE: 70 BPM | WEIGHT: 292.2 LBS

## 2024-08-07 DIAGNOSIS — E55.9 VITAMIN D DEFICIENCY, UNSPECIFIED: ICD-10-CM

## 2024-08-07 DIAGNOSIS — E11.65 TYPE 2 DIABETES MELLITUS WITH HYPERGLYCEMIA, WITHOUT LONG-TERM CURRENT USE OF INSULIN (HCC): Primary | ICD-10-CM

## 2024-08-07 DIAGNOSIS — I10 PRIMARY HYPERTENSION: ICD-10-CM

## 2024-08-07 PROCEDURE — 3044F HG A1C LEVEL LT 7.0%: CPT | Performed by: FAMILY MEDICINE

## 2024-08-07 PROCEDURE — 99214 OFFICE O/P EST MOD 30 MIN: CPT | Performed by: FAMILY MEDICINE

## 2024-08-07 PROCEDURE — 3074F SYST BP LT 130 MM HG: CPT | Performed by: FAMILY MEDICINE

## 2024-08-07 PROCEDURE — 3078F DIAST BP <80 MM HG: CPT | Performed by: FAMILY MEDICINE

## 2024-08-07 RX ORDER — OMEPRAZOLE 40 MG/1
40 CAPSULE, DELAYED RELEASE ORAL DAILY
Qty: 90 CAPSULE | Refills: 3 | Status: SHIPPED | OUTPATIENT
Start: 2024-08-07

## 2024-08-07 RX ORDER — CYANOCOBALAMIN (VITAMIN B-12) 1000 MCG
TABLET, SUBLINGUAL SUBLINGUAL
COMMUNITY

## 2024-08-07 RX ORDER — AMLODIPINE BESYLATE 5 MG/1
TABLET ORAL
Qty: 90 TABLET | Refills: 3 | Status: SHIPPED | OUTPATIENT
Start: 2024-08-07

## 2024-08-07 RX ORDER — IBUPROFEN 800 MG/1
800 TABLET ORAL EVERY 8 HOURS PRN
Qty: 90 TABLET | Refills: 0 | Status: SHIPPED | OUTPATIENT
Start: 2024-08-07

## 2024-08-07 RX ORDER — LOSARTAN POTASSIUM AND HYDROCHLOROTHIAZIDE 12.5; 1 MG/1; MG/1
TABLET ORAL
Qty: 90 TABLET | Refills: 3 | Status: SHIPPED | OUTPATIENT
Start: 2024-08-07

## 2024-08-07 RX ORDER — ORAL SEMAGLUTIDE 14 MG/1
14 TABLET ORAL DAILY
Qty: 30 TABLET | Refills: 4 | Status: SHIPPED | OUTPATIENT
Start: 2024-08-07

## 2024-08-07 RX ORDER — SIMVASTATIN 10 MG
10 TABLET ORAL NIGHTLY
Qty: 90 TABLET | Refills: 3 | Status: SHIPPED | OUTPATIENT
Start: 2024-08-07

## 2024-08-07 ASSESSMENT — ANXIETY QUESTIONNAIRES
7. FEELING AFRAID AS IF SOMETHING AWFUL MIGHT HAPPEN: NOT AT ALL
6. BECOMING EASILY ANNOYED OR IRRITABLE: NOT AT ALL
3. WORRYING TOO MUCH ABOUT DIFFERENT THINGS: NOT AT ALL
5. BEING SO RESTLESS THAT IT IS HARD TO SIT STILL: NOT AT ALL
GAD7 TOTAL SCORE: 0
2. NOT BEING ABLE TO STOP OR CONTROL WORRYING: NOT AT ALL
IF YOU CHECKED OFF ANY PROBLEMS ON THIS QUESTIONNAIRE, HOW DIFFICULT HAVE THESE PROBLEMS MADE IT FOR YOU TO DO YOUR WORK, TAKE CARE OF THINGS AT HOME, OR GET ALONG WITH OTHER PEOPLE: NOT DIFFICULT AT ALL
1. FEELING NERVOUS, ANXIOUS, OR ON EDGE: NOT AT ALL
4. TROUBLE RELAXING: NOT AT ALL

## 2024-08-07 ASSESSMENT — PATIENT HEALTH QUESTIONNAIRE - PHQ9
SUM OF ALL RESPONSES TO PHQ QUESTIONS 1-9: 0
SUM OF ALL RESPONSES TO PHQ9 QUESTIONS 1 & 2: 0
1. LITTLE INTEREST OR PLEASURE IN DOING THINGS: NOT AT ALL
SUM OF ALL RESPONSES TO PHQ QUESTIONS 1-9: 0
2. FEELING DOWN, DEPRESSED OR HOPELESS: NOT AT ALL
SUM OF ALL RESPONSES TO PHQ QUESTIONS 1-9: 0
SUM OF ALL RESPONSES TO PHQ QUESTIONS 1-9: 0

## 2024-08-07 NOTE — PROGRESS NOTES
HPI:  Charisse Linares is a 59 y.o. female who presents today with   Chief Complaint   Patient presents with    Hypertension     3 month follow-up     Diabetes        Patient's BP is stable; Pt is on medications as listed below and  tolerates medications well  Patient also has DM.  Pt is on no meds for DM. Pt  tolerates meds prescribed and is compliant with the medication.  Last labs are as listed below.  These have been reviewed with the patient.  She attempts a lower carb diet  She takes ibuprofen for knee pain.  She requests a refill.  Her knee pain is better overall however    She is also in need of refills on her other medications as well.    Has lost 17 pounds.  She is on Rybelsus.    Wt Readings from Last 3 Encounters:   08/07/24 132.5 kg (292 lb 3.2 oz)   05/02/24 (!) 140.4 kg (309 lb 9.6 oz)   04/04/24 (!) 137 kg (302 lb)         Lab Results   Component Value Date    CHOL 142 08/02/2024    TRIG 119 08/02/2024    HDL 34 (L) 08/02/2024    LDL 84 08/02/2024    VLDL 24 08/02/2024    CHOLHDLRATIO 4.2 08/02/2024     Lab Results   Component Value Date     08/02/2024    K 4.2 08/02/2024     08/02/2024    CO2 27 08/02/2024    BUN 19 08/02/2024    CREATININE 0.6 08/02/2024    GLUCOSE 107 (H) 08/02/2024    CALCIUM 9.6 08/02/2024    BILITOT 0.2 08/02/2024    ALKPHOS 80 08/02/2024    AST 11 08/02/2024    ALT 11 08/02/2024    LABGLOM >60.0 08/02/2024    GFRAA >60.0 04/05/2022    AGRATIO 1.8 08/02/2024    GLOB 2.4 08/02/2024       Hemoglobin A1C   Date Value Ref Range Status   08/02/2024 6.5 (H) 4.8 - 5.6 % Final                    No data to display                        PMH,  Meds, Allergies, Family History, Social history reviewed      Current Outpatient Medications   Medication Sig Dispense Refill    Cyanocobalamin (VITAMIN B-12) 500 MCG SUBL Place under the tongue      ibuprofen (ADVIL;MOTRIN) 800 MG tablet Take 1 tablet by mouth every 8 hours as needed for Pain 90 tablet 0    simvastatin (ZOCOR) 10 MG

## 2024-08-07 NOTE — PROGRESS NOTES
\"Have you been to the ER, urgent care clinic since your last visit?  Hospitalized since your last visit?\"    NO    “Have you seen or consulted any other health care providers outside of [facility] since your last visit?”    Yes, OBGYN Dr. KIRSTIN Chowdary     Click Here for Release of Records Request

## 2024-11-29 ENCOUNTER — HOSPITAL ENCOUNTER (OUTPATIENT)
Facility: HOSPITAL | Age: 59
Setting detail: SPECIMEN
Discharge: HOME | End: 2024-12-02

## 2024-11-29 LAB — SENTARA SPECIMEN COLLECTION: NORMAL

## 2024-11-29 PROCEDURE — 99001 SPECIMEN HANDLING PT-LAB: CPT

## 2024-11-30 LAB
A/G RATIO: 1.8 RATIO (ref 1.1–2.6)
ALBUMIN: 4.4 G/DL (ref 3.5–5)
ALP BLD-CCNC: 90 U/L (ref 25–115)
ALT SERPL-CCNC: 17 U/L (ref 5–40)
ANION GAP SERPL CALCULATED.3IONS-SCNC: 12 MMOL/L (ref 3–15)
AST SERPL-CCNC: 14 U/L (ref 10–37)
BILIRUB SERPL-MCNC: 0.2 MG/DL (ref 0.2–1.2)
BUN BLDV-MCNC: 17 MG/DL (ref 6–22)
CALCIUM SERPL-MCNC: 9.6 MG/DL (ref 8.4–10.5)
CHLORIDE BLD-SCNC: 106 MMOL/L (ref 98–110)
CHOLESTEROL, TOTAL: 156 MG/DL (ref 110–200)
CHOLESTEROL/HDL RATIO: 3.8 (ref 0–5)
CO2: 27 MMOL/L (ref 20–32)
CREAT SERPL-MCNC: 0.6 MG/DL (ref 0.5–1.2)
ESTIMATED AVERAGE GLUCOSE: 148 MG/DL (ref 91–123)
GFR, ESTIMATED: >60 ML/MIN/1.73 SQ.M.
GLOBULIN: 2.5 G/DL (ref 2–4)
GLUCOSE: 140 MG/DL (ref 70–99)
HBA1C MFR BLD: 6.8 % (ref 4.8–5.6)
HDLC SERPL-MCNC: 41 MG/DL
LDL CHOLESTEROL: 91 MG/DL (ref 50–99)
LDL/HDL RATIO: 2.2
NON-HDL CHOLESTEROL: 115 MG/DL
POTASSIUM SERPL-SCNC: 4.2 MMOL/L (ref 3.5–5.5)
SODIUM BLD-SCNC: 145 MMOL/L (ref 133–145)
TOTAL PROTEIN: 6.9 G/DL (ref 6.4–8.3)
TRIGL SERPL-MCNC: 119 MG/DL (ref 40–149)
TSH SERPL DL<=0.05 MIU/L-ACNC: 0.97 MCU/ML (ref 0.27–4.2)
VITAMIN D 25-HYDROXY: 54.7 NG/ML (ref 32–100)
VLDLC SERPL CALC-MCNC: 24 MG/DL (ref 8–30)

## 2024-12-05 ENCOUNTER — HOSPITAL ENCOUNTER (OUTPATIENT)
Facility: HOSPITAL | Age: 59
Setting detail: SPECIMEN
Discharge: HOME | End: 2024-12-08

## 2024-12-05 ENCOUNTER — OFFICE VISIT (OUTPATIENT)
Facility: CLINIC | Age: 59
End: 2024-12-05
Payer: COMMERCIAL

## 2024-12-05 VITALS
SYSTOLIC BLOOD PRESSURE: 132 MMHG | HEART RATE: 74 BPM | RESPIRATION RATE: 18 BRPM | HEIGHT: 63 IN | BODY MASS INDEX: 51.63 KG/M2 | OXYGEN SATURATION: 98 % | DIASTOLIC BLOOD PRESSURE: 83 MMHG | WEIGHT: 291.4 LBS | TEMPERATURE: 97.5 F

## 2024-12-05 DIAGNOSIS — E55.9 VITAMIN D DEFICIENCY, UNSPECIFIED: ICD-10-CM

## 2024-12-05 DIAGNOSIS — Z00.00 WELL ADULT EXAM: Primary | ICD-10-CM

## 2024-12-05 DIAGNOSIS — I10 PRIMARY HYPERTENSION: ICD-10-CM

## 2024-12-05 DIAGNOSIS — E11.65 TYPE 2 DIABETES MELLITUS WITH HYPERGLYCEMIA, WITHOUT LONG-TERM CURRENT USE OF INSULIN (HCC): ICD-10-CM

## 2024-12-05 LAB — SENTARA SPECIMEN COLLECTION: NORMAL

## 2024-12-05 PROCEDURE — 99001 SPECIMEN HANDLING PT-LAB: CPT

## 2024-12-05 PROCEDURE — 3075F SYST BP GE 130 - 139MM HG: CPT | Performed by: FAMILY MEDICINE

## 2024-12-05 PROCEDURE — 99396 PREV VISIT EST AGE 40-64: CPT | Performed by: FAMILY MEDICINE

## 2024-12-05 PROCEDURE — 3079F DIAST BP 80-89 MM HG: CPT | Performed by: FAMILY MEDICINE

## 2024-12-05 RX ORDER — ORAL SEMAGLUTIDE 14 MG/1
14 TABLET ORAL DAILY
Qty: 30 TABLET | Refills: 5 | Status: SHIPPED | OUTPATIENT
Start: 2024-12-05

## 2024-12-05 SDOH — ECONOMIC STABILITY: FOOD INSECURITY: WITHIN THE PAST 12 MONTHS, YOU WORRIED THAT YOUR FOOD WOULD RUN OUT BEFORE YOU GOT MONEY TO BUY MORE.: NEVER TRUE

## 2024-12-05 SDOH — ECONOMIC STABILITY: FOOD INSECURITY: WITHIN THE PAST 12 MONTHS, THE FOOD YOU BOUGHT JUST DIDN'T LAST AND YOU DIDN'T HAVE MONEY TO GET MORE.: NEVER TRUE

## 2024-12-05 SDOH — ECONOMIC STABILITY: INCOME INSECURITY: HOW HARD IS IT FOR YOU TO PAY FOR THE VERY BASICS LIKE FOOD, HOUSING, MEDICAL CARE, AND HEATING?: NOT HARD AT ALL

## 2024-12-05 ASSESSMENT — ANXIETY QUESTIONNAIRES
2. NOT BEING ABLE TO STOP OR CONTROL WORRYING: NOT AT ALL
1. FEELING NERVOUS, ANXIOUS, OR ON EDGE: NOT AT ALL
IF YOU CHECKED OFF ANY PROBLEMS ON THIS QUESTIONNAIRE, HOW DIFFICULT HAVE THESE PROBLEMS MADE IT FOR YOU TO DO YOUR WORK, TAKE CARE OF THINGS AT HOME, OR GET ALONG WITH OTHER PEOPLE: NOT DIFFICULT AT ALL
4. TROUBLE RELAXING: NOT AT ALL
GAD7 TOTAL SCORE: 0
3. WORRYING TOO MUCH ABOUT DIFFERENT THINGS: NOT AT ALL
7. FEELING AFRAID AS IF SOMETHING AWFUL MIGHT HAPPEN: NOT AT ALL
6. BECOMING EASILY ANNOYED OR IRRITABLE: NOT AT ALL
5. BEING SO RESTLESS THAT IT IS HARD TO SIT STILL: NOT AT ALL

## 2024-12-05 ASSESSMENT — PATIENT HEALTH QUESTIONNAIRE - PHQ9
2. FEELING DOWN, DEPRESSED OR HOPELESS: NOT AT ALL
SUM OF ALL RESPONSES TO PHQ9 QUESTIONS 1 & 2: 0
SUM OF ALL RESPONSES TO PHQ QUESTIONS 1-9: 0
SUM OF ALL RESPONSES TO PHQ QUESTIONS 1-9: 0
1. LITTLE INTEREST OR PLEASURE IN DOING THINGS: NOT AT ALL
SUM OF ALL RESPONSES TO PHQ QUESTIONS 1-9: 0
SUM OF ALL RESPONSES TO PHQ QUESTIONS 1-9: 0

## 2024-12-05 NOTE — PROGRESS NOTES
\"Have you been to the ER, urgent care clinic since your last visit?  Hospitalized since your last visit?\"    NO    “Have you seen or consulted any other health care providers outside of [facility]?”    This include any pap smears or colon screening. Yes, OBGYN- Dr. KIRSTIN Chowdary, [facility], Ophthalmology- Dr. ARMANDO Hopkins and Dental: [facility]    Click Here for Release of Records Request   
were advised that Artificial Intelligence will be utilized during this visit to record and process the conversation to generate a clinical note. The patient (or guardian, if applicable) and other individuals in attendance at the appointment consented to the use of AI, including the recording.      
9 (severe pain)

## 2024-12-06 LAB
CREATININE URINE: 98 MG/DL
MICROALB/CREAT RATIO (UG/MG CREAT.): NORMAL
MICROALBUMIN/CREAT 24H UR: <12 MG/L (ref 0.1–17)

## 2024-12-09 NOTE — PROGRESS NOTES
DRAIN/INJECT LARGE JOINT/BURSA    G89.29 betamethasone acetate-betamethasone sodium phosphate (CELESTONE) injection 3 mg     BUPivacaine (MARCAINE) 0.5 % injection 20 mg     Ambulatory Referral to Ortho Injection      2. Chronic pain of right knee  M25.561 DRAIN/INJECT LARGE JOINT/BURSA    G89.29 betamethasone acetate-betamethasone sodium phosphate (CELESTONE) injection 3 mg     BUPivacaine (MARCAINE) 0.5 % injection 20 mg     Ambulatory Referral to Ortho Injection      3. Unilateral primary osteoarthritis, left knee  M17.12 DRAIN/INJECT LARGE JOINT/BURSA     betamethasone acetate-betamethasone sodium phosphate (CELESTONE) injection 3 mg     BUPivacaine (MARCAINE) 0.5 % injection 20 mg     Ambulatory Referral to Ortho Injection      4. Unilateral primary osteoarthritis, right knee  M17.11 DRAIN/INJECT LARGE JOINT/BURSA     betamethasone acetate-betamethasone sodium phosphate (CELESTONE) injection 3 mg     BUPivacaine (MARCAINE) 0.5 % injection 20 mg     Ambulatory Referral to Ortho Injection        PLAN:  Patient's knees injected 4 cc 0.25% Marcaine and 0.5 cc Celestone. Consider viscosupplementation if pain continues.  Follow up PRN.    Documentation by dirk Nunez, as documented by Lakhwinder Mancini MD.

## 2024-12-12 ENCOUNTER — OFFICE VISIT (OUTPATIENT)
Age: 59
End: 2024-12-12

## 2024-12-12 VITALS — BODY MASS INDEX: 51.38 KG/M2 | HEIGHT: 63 IN | WEIGHT: 290 LBS

## 2024-12-12 DIAGNOSIS — M25.561 CHRONIC PAIN OF RIGHT KNEE: ICD-10-CM

## 2024-12-12 DIAGNOSIS — M25.562 CHRONIC PAIN OF LEFT KNEE: Primary | ICD-10-CM

## 2024-12-12 DIAGNOSIS — M17.12 UNILATERAL PRIMARY OSTEOARTHRITIS, LEFT KNEE: ICD-10-CM

## 2024-12-12 DIAGNOSIS — G89.29 CHRONIC PAIN OF LEFT KNEE: Primary | ICD-10-CM

## 2024-12-12 DIAGNOSIS — G89.29 CHRONIC PAIN OF RIGHT KNEE: ICD-10-CM

## 2024-12-12 DIAGNOSIS — M17.11 UNILATERAL PRIMARY OSTEOARTHRITIS, RIGHT KNEE: ICD-10-CM

## 2024-12-12 RX ORDER — BUPIVACAINE HYDROCHLORIDE 5 MG/ML
4 INJECTION, SOLUTION PERINEURAL ONCE
Status: COMPLETED | OUTPATIENT
Start: 2024-12-12 | End: 2024-12-12

## 2024-12-12 RX ORDER — BETAMETHASONE SODIUM PHOSPHATE AND BETAMETHASONE ACETATE 3; 3 MG/ML; MG/ML
3 INJECTION, SUSPENSION INTRA-ARTICULAR; INTRALESIONAL; INTRAMUSCULAR; SOFT TISSUE ONCE
Status: COMPLETED | OUTPATIENT
Start: 2024-12-12 | End: 2024-12-12

## 2024-12-12 RX ADMIN — BUPIVACAINE HYDROCHLORIDE 20 MG: 5 INJECTION, SOLUTION PERINEURAL at 15:47

## 2024-12-12 RX ADMIN — BETAMETHASONE SODIUM PHOSPHATE AND BETAMETHASONE ACETATE 3 MG: 3; 3 INJECTION, SUSPENSION INTRA-ARTICULAR; INTRALESIONAL; INTRAMUSCULAR; SOFT TISSUE at 15:47

## 2024-12-12 RX ADMIN — BETAMETHASONE SODIUM PHOSPHATE AND BETAMETHASONE ACETATE 3 MG: 3; 3 INJECTION, SUSPENSION INTRA-ARTICULAR; INTRALESIONAL; INTRAMUSCULAR; SOFT TISSUE at 15:46

## 2024-12-24 NOTE — TELEPHONE ENCOUNTER
Last Visit: 12/05/2024   Next Appointment: 04/09/2025    Requested Prescriptions     Pending Prescriptions Disp Refills    ibuprofen (ADVIL;MOTRIN) 800 MG tablet [Pharmacy Med Name: Ibuprofen 800 MG Oral Tablet] 90 tablet 0     Sig: TAKE 1 TABLET BY MOUTH EVERY 8 HOURS AS NEEDED FOR PAIN

## 2024-12-26 RX ORDER — IBUPROFEN 800 MG/1
800 TABLET, FILM COATED ORAL EVERY 12 HOURS PRN
Qty: 30 TABLET | Refills: 1 | Status: SHIPPED | OUTPATIENT
Start: 2024-12-26

## 2025-01-24 NOTE — PROGRESS NOTES
Chief Complaint Patient presents with  Well Woman 1. Have you been to the ER, urgent care clinic since your last visit? Hospitalized since your last visit? No  
2. Have you seen or consulted any other health care providers outside of the [address] [place] [address] since your last visit? Include any pap smears or colon screening. Follow with GYN (Dr. Kaitlynn Damian) Subjective:  
48 y.o. female for Well Woman Check. Her gyne and breast care is done elsewhere by her Ob-Gyne physician / Dr. Chandan Pereira She is continuing to lose weight. She is eating well; plans to start exercising as well. She needs a refill on iron. Wt Readings from Last 3 Encounters:  
01/07/19 268 lb 3.2 oz (121.7 kg) 10/05/18 278 lb 3.2 oz (126.2 kg) 09/06/18 281 lb 9.6 oz (127.7 kg) Patient Active Problem List  
 Diagnosis Date Noted  Obesity, morbid ([facility] 03/20/2018  Sleep apnea  Knee pain, left 01/21/2014  Prediabetes 05/01/2013  Hypercholesterolemia 07/20/2011  Vitamin D deficiency 05/27/2010  DDD (degenerative disc disease), cervical 05/27/2010  
 HTN (hypertension) 05/27/2010  Iron deficiency anemia 05/27/2010 Current Outpatient Medications Medication Sig Dispense Refill  ferrous sulfate ER (IRON) 160 mg (50 mg iron) TbER tablet Take 1 Tab by mouth daily. 90 Tab 3  ibuprofen (MOTRIN) 800 mg tablet TAKE 1 TABLET BY MOUTH THREE TIMES DAILY WITH MEALS 90 Tab 0  
 omeprazole (PRILOSEC) 40 mg capsule TAKE 1 CAPSULE BY MOUTH ONCE DAILY 90 Cap 1  
 losartan-hydroCHLOROthiazide (HYZAAR) 100-12.5 mg per tablet TAKE ONE TABLET BY MOUTH ONCE DAILY 90 Tab 3  
 simvastatin (ZOCOR) 10 mg tablet TAKE ONE TABLET BY MOUTH NIGHTLY 90 Tab 3  
 amLODIPine (NORVASC) 5 mg tablet Take 1 Tab by mouth daily. 90 Tab 3 Allergies Allergen Reactions  Lisinopril Cough Past Medical History:  
Diagnosis Date  Allergic rhinitis  Anemia  Arthritis  BMI 45.0-49.9, adult ([facility] I have reviewed all the lab results.  The slight variations  in the Creatinine, Calcium, ALT, RBC, Segmented cells, Lymphocytes noted are negligible.  Overall the labs are normal/stable.   Next step for the tingling sensation is for neurology to evaluate you.     Lipid labs (Triglycerides and Total Cholesterol) are mildly elevated.  I recommend a diet rich in fiber, water, fruits and vegetables. Decrease processed food intake. Exercise 4-6 times (including cardio) weekly for at least 30 -45 minutes each time.  I would like to repeat the lipid panel in 1 year.   49.5  
 Conjunctivitis, bacterial 04  
 left  Fibroids  GERD (gastroesophageal reflux disease)  Hiatal hernia  High cholesterol  Hypercholesterolemia - TSH 0.6  Hypertension  Knee pain, left  Pelvic pain  Sleep apnea Does not use CPAP  
 Uterine fibroid  Vitamin D deficiencies 10-     (10)  
       (25)  Vitamin D deficiency Past Surgical History:  
Procedure Laterality Date  COLONOSCOPY N/A 2016 COLONOSCOPY performed by Chuyita Grant MD at [address]    
 2 times  HX  SECTION  97 ; 01  
 HX COLONOSCOPY    
 HX DILATION AND CURETTAGE    
 3 times  HX ENDOSCOPY    
 HX MYOMECTOMY  HX MYOMECTOMY   Family History Problem Relation Age of Onset  Hypertension Mother  Diabetes Maternal Uncle  Breast Cancer Maternal Grandmother  Heart Attack Maternal Grandfather  Hypertension Paternal Grandmother  Cancer Mother   
     uterine  Cancer Father   
     brain  Cancer Maternal Aunt   
     breast  
 Cancer Maternal Grandmother   
     breast  
 Heart Disease Paternal Grandfather  Cancer Cousin   
     breast  
 
Social History Tobacco Use  Smoking status: Never Smoker  Smokeless tobacco: Never Used Substance Use Topics  Alcohol use: Yes Alcohol/week: 0.0 oz Types: 2 Glasses of wine per week Comment: on ocassion Lab Results Component Value Date/Time WBC 5.7 2018 08:38 AM  
 HGB 11.5 (L) 2018 08:38 AM  
 Hemoglobin, POC 6.8 (L) 2016 09:09 AM  
 HCT 37.0 2018 08:38 AM  
 Hematocrit, POC 20 (L) 2016 09:09 AM  
 PLATELET 329  08:38 AM  
 MCV 79.2 2018 08:38 AM  
 
Lab Results Component Value Date/Time  Hemoglobin A1c 6.4 (H) 2018 08:38 AM  
 Hemoglobin A1c 7.0 (H) 2018 11:50 AM  
 Hemoglobin A1c 7.2 (H) 2018 09:08 AM  
 Glucose 130 (H) 09/06/2018 08:38 AM  
 Glucose (POC) 85 11/23/2016 11:04 AM  
 Glucose, POC 69 (L) 11/23/2016 09:09 AM  
 LDL, calculated 81.6 09/06/2018 08:38 AM  
 Creatinine 0.83 09/06/2018 08:38 AM  
  
Lab Results Component Value Date/Time Cholesterol, total 153 09/06/2018 08:38 AM  
 HDL Cholesterol 43 09/06/2018 08:38 AM  
 LDL, calculated 81.6 09/06/2018 08:38 AM  
 Triglyceride 142 09/06/2018 08:38 AM  
 CHOL/HDL Ratio 3.6 09/06/2018 08:38 AM  
  
 
ROS: Feeling generally well. No TIA's or unusual headaches, no dysphagia. No prolonged cough. No dyspnea or chest pain on exertion. No abdominal pain, change in bowel habits, black or bloody stools. No urinary tract symptoms. No new or unusual musculoskeletal symptoms. Some left shoulder pain followed by ortho. Specific concerns today: none. Objective: The patient appears well, alert, oriented x 3, in no distress. Visit Vitals /76 (BP 1 Location: Left arm, BP Patient Position: Sitting) Pulse 94 Temp 98 °F (36.7 °C) (Oral) Resp 17 Ht 5' 3\" (1.6 m) Wt 268 lb 3.2 oz (121.7 kg) LMP 09/22/2018 SpO2 99% BMI 47.51 kg/m² ENT normal.  Neck supple. No adenopathy or thyromegaly. DEMAR. Lungs are clear, good air entry, no wheezes, rhonchi or rales. S1 and S2 normal, no murmurs, regular rate and rhythm. Abdomen soft without tenderness, guarding, mass or organomegaly. Extremities show no edema, normal peripheral pulses. Neurological is normal, no focal findings. Breast and Pelvic exams are deferred. Lab Results Component Value Date/Time Cholesterol, total 153 09/06/2018 08:38 AM  
 HDL Cholesterol 43 09/06/2018 08:38 AM  
 LDL, calculated 81.6 09/06/2018 08:38 AM  
 VLDL, calculated 28.4 09/06/2018 08:38 AM  
 Triglyceride 142 09/06/2018 08:38 AM  
 CHOL/HDL Ratio 3.6 09/06/2018 08:38 AM  
 
Lab Results Component Value Date/Time  Sodium 143 09/06/2018 08:38 AM  
 Potassium 3.9 09/06/2018 08:38 AM  
 Chloride 107 09/06/2018 08:38 AM  
 CO2 28 09/06/2018 08:38 AM  
 Anion gap 8 09/06/2018 08:38 AM  
 Glucose 130 (H) 09/06/2018 08:38 AM  
 BUN 16 09/06/2018 08:38 AM  
 Creatinine 0.83 09/06/2018 08:38 AM  
 BUN/Creatinine ratio 19 09/06/2018 08:38 AM  
 GFR est AA >60 09/06/2018 08:38 AM  
 GFR est non-AA >60 09/06/2018 08:38 AM  
 Calcium 9.2 09/06/2018 08:38 AM  
 
Lab Results Component Value Date/Time Hemoglobin A1c 6.4 (H) 09/06/2018 08:38 AM  
 
 
 
Assessment/Plan:  
Well Woman 
increase physical activity, follow low fat diet, follow low salt diet, continue present plan, routine labs ordered ICD-10-CM ICD-9-CM 1. Well woman exam (no gynecological exam) Z00.00 V70.0 [V70.0] 2. Screening for cardiovascular condition Z13.6 V81.2 LIPID PANEL 3. Encounter for immunization Z23 V03.89 TETANUS, DIPHTHERIA TOXOIDS AND ACELLULAR PERTUSSIS VACCINE (TDAP), IN INDIVIDS. >=7, IM 4. Need for hepatitis C screening test Z11.59 V73.89 HEPATITIS C AB  
5. Hypercholesterolemia- for lab only E78.00 272.0 AST ALT 6. Prediabetes- for lab only R73.03 790.29 HEMOGLOBIN A1C WITH EAG 7. Iron deficiency anemia, unspecified iron deficiency anemia type- for lab only D50.9 280.9 CBC WITH AUTOMATED DIFF As above, pt stable and well 
 treatment plan as listed below Orders Placed This Encounter  Tetanus, diphtheria toxoids and acellular pertussis (TDAP) vaccine, in individuals >=7 years, IM  
 HEPATITIS C AB  
 LIPID PANEL  
 HEMOGLOBIN A1C WITH EAG  
 AST  ALT  CBC WITH AUTOMATED DIFF  ferrous sulfate ER (IRON) 160 mg (50 mg iron) TbER tablet Follow-up Disposition: 
Return in about 4 months (around 5/7/2019) for htn/chol. An After Visit Summary was printed and given to the patient. This has been fully explained to the patient, who indicates understanding.

## 2025-02-12 ENCOUNTER — OFFICE VISIT (OUTPATIENT)
Age: 60
End: 2025-02-12
Payer: COMMERCIAL

## 2025-02-12 VITALS
OXYGEN SATURATION: 99 % | RESPIRATION RATE: 16 BRPM | WEIGHT: 288.5 LBS | SYSTOLIC BLOOD PRESSURE: 163 MMHG | HEART RATE: 86 BPM | DIASTOLIC BLOOD PRESSURE: 82 MMHG | TEMPERATURE: 97.5 F | BODY MASS INDEX: 53.09 KG/M2 | HEIGHT: 62 IN

## 2025-02-12 DIAGNOSIS — I10 PRIMARY HYPERTENSION: ICD-10-CM

## 2025-02-12 DIAGNOSIS — E78.5 HYPERLIPIDEMIA, UNSPECIFIED HYPERLIPIDEMIA TYPE: ICD-10-CM

## 2025-02-12 DIAGNOSIS — E66.01 OBESITY, MORBID: ICD-10-CM

## 2025-02-12 DIAGNOSIS — M25.562 LEFT KNEE PAIN, UNSPECIFIED CHRONICITY: ICD-10-CM

## 2025-02-12 DIAGNOSIS — E88.810 METABOLIC SYNDROME: ICD-10-CM

## 2025-02-12 DIAGNOSIS — E11.9 TYPE 2 DIABETES MELLITUS WITHOUT COMPLICATION, WITHOUT LONG-TERM CURRENT USE OF INSULIN (HCC): ICD-10-CM

## 2025-02-12 DIAGNOSIS — G47.30 SLEEP APNEA, UNSPECIFIED TYPE: Primary | ICD-10-CM

## 2025-02-12 PROCEDURE — 3077F SYST BP >= 140 MM HG: CPT | Performed by: STUDENT IN AN ORGANIZED HEALTH CARE EDUCATION/TRAINING PROGRAM

## 2025-02-12 PROCEDURE — 99204 OFFICE O/P NEW MOD 45 MIN: CPT | Performed by: STUDENT IN AN ORGANIZED HEALTH CARE EDUCATION/TRAINING PROGRAM

## 2025-02-12 PROCEDURE — 3079F DIAST BP 80-89 MM HG: CPT | Performed by: STUDENT IN AN ORGANIZED HEALTH CARE EDUCATION/TRAINING PROGRAM

## 2025-02-12 NOTE — PROGRESS NOTES
Bariatric Surgery Initial Consult    Patient: Charisse Linares MRN: [id number]  SSN: [id number]    YOB: 1965  Age: 59 y.o.  Sex: female      Subjective:      CC: Morbid Obesity    Current Weight: 288  Body mass index is 52.77 kg/m².  Ideal body weight: 50.1 kg (110 lb 7.2 oz)  Adjusted ideal body weight: 82.4 kg (181 lb 10.7 oz)  Excess Body Weight: 163     History of Present Illness  The patient is a 59-year-old female who presents for weight loss.    She has been grappling with weight loss issues since the birth of her children, with her youngest child now being 24 years old. Her highest recorded weight was 310 pounds, and she has tried various weight loss programs such as Nutrisystem, Vanda Santiago, and other food programs. She has also attempted self-devised plans and the Cabbage Soup Diet. Approximately 10 years ago, she explored the bariatric pathway but decided against it, believing she could manage her weight independently.  She has set a weight loss goal of 100 pounds and is considering gastric bypass surgery.  She has previously been on Ozempic and is currently on Rybelsus, which she believes has prevented further weight gain. She has also tried phentermine.    She has a history of diabetes, with her hemoglobin A1c ranging between 6.5 and 7% over the past few years.  She only takes Rybelsus for this.  She has a history of GERD. She takes omeprazole, which effectively manages her symptoms. She has undergone an EGD in the past, which reportedly did not reveal any abnormalities, but she cannot recall specifically. She experiences pain in her legs, knees, and feet upon standing or moving, which she attributes to her arthritis.  She has seen Dr. Mancini in the past and has undergone bilateral knee injections.  She also has a history of sleep apnea and is compliant with her CPAP.  She also has a history of high blood pressure and high cholesterol.  She takes ibuprofen for knee pain a few times

## 2025-02-12 NOTE — PROGRESS NOTES
Chief Complaint   Patient presents with    New Patient    Surgical Consult     Gastric Bypass Consult

## 2025-02-14 ENCOUNTER — PREP FOR PROCEDURE (OUTPATIENT)
Age: 60
End: 2025-02-14

## 2025-02-14 DIAGNOSIS — E66.01 MORBID OBESITY: ICD-10-CM

## 2025-02-20 ENCOUNTER — CLINICAL DOCUMENTATION (OUTPATIENT)
Facility: HOSPITAL | Age: 60
End: 2025-02-20

## 2025-02-20 ENCOUNTER — HOSPITAL ENCOUNTER (OUTPATIENT)
Facility: HOSPITAL | Age: 60
Discharge: HOME | End: 2025-02-23

## 2025-02-20 NOTE — PROGRESS NOTES
[facility]  [address], [address]      Patient's Name: Charisse Linares     Age: 59 y.o.  YOB: 1965     Sex: female           Session 1 of 3   Surgeon:  Dr. Gallagher    Height: 5 f 2   Current Weight:    288      Lbs.     BMI:    Pounds Lost since last month: 0                 Pounds Gained since last month: 0    Starting Weight: 288     Previous Month’s Weight: 288  Overall Pounds Lost: 0   Overall Pounds Gained: 0    Office Use only: v  Class Guidelines  Guidelines are reviewed with patient at the start of every class.  1. Patient understands that weight loss trial classes must be consecutive.  Patient understands if they miss a class, it is their responsibility to contact me to reschedule class.  I will reach out to patient after their first no show.  2.  Patient understands the expectations that weight maintenance/weight loss is expected during the classes.  Failure to demonstrate changes may result in one extra month of weight loss trial, followed by going back to see the surgeon.    3. Patient is also instructed to be doing their labs, blood work, psych visit, support group and any other test that the surgeon has used while they are working on their weight loss trial.    Other Pertinent Information:     Weight Loss Attempts:    Patient is seeking a revision procedure: n/a.      Patient has been prescribed rebelsus to help with weight loss.  This medication has been prescribed by: Dr. Yolanda Youngblood.    Other:  Patient has not been to a support group yet?    Lifestyle Habits:  Does patient smoke?  None    Alcohol intake:  Number of drinks at a time:  glass of wine 1-2 x a month  Number of times a week:     Diet Changes Made Since Last Class: Awareness of what she is eating    Eating Habits and Behaviors      Today we started off class talking about the Key Diet Principles.  Patient was encouraged to start drinking

## 2025-03-04 ENCOUNTER — CLINICAL DOCUMENTATION (OUTPATIENT)
Age: 60
End: 2025-03-04

## 2025-03-04 ENCOUNTER — PREP FOR PROCEDURE (OUTPATIENT)
Age: 60
End: 2025-03-04

## 2025-03-04 DIAGNOSIS — I10 PRIMARY HYPERTENSION: Primary | ICD-10-CM

## 2025-03-04 DIAGNOSIS — Z12.11 COLON CANCER SCREENING: ICD-10-CM

## 2025-03-04 DIAGNOSIS — I10 PRIMARY HYPERTENSION: ICD-10-CM

## 2025-03-04 DIAGNOSIS — E11.9 TYPE 2 DIABETES MELLITUS WITHOUT COMPLICATION, WITHOUT LONG-TERM CURRENT USE OF INSULIN (HCC): ICD-10-CM

## 2025-03-04 NOTE — PROGRESS NOTES
Colon Screen    Patient: Charisse Linares MRN: [id number]  SSN: [id number]    YOB: 1965  Age: 60 y.o.  Sex: female        Subjective:   Charisse Linares was referred by Dr. Gallagher. Her PCP is Yolanda Youngblood MD.  Patient referred for colonoscopy for   Screening colonoscopy. Patient denies rectal pain or bleeding.  Abdominal surgeries as described below, specifically  section x 2 and hysterectomy.  Family history as described below, specifically mother with colon polyps. Last colonoscopy was 9 years ago.    Allergies   Allergen Reactions    Lisinopril Cough and Other (See Comments)       Past Medical History:   Diagnosis Date    Allergic rhinitis     Anemia     Arthritis     BMI 45.0-49.9, adult     49.5    Conjunctivitis, bacterial 04    left    Fibroids     GERD (gastroesophageal reflux disease)     High cholesterol     Hypercholesterolemia 2-    TSH 0.6    Hypertension     Knee pain, left     Osteoarthritis     Pelvic pain     Sleep apnea     USES CPAP    Uterine fibroid     Vitamin D deficiency      Past Surgical History:   Procedure Laterality Date     SECTION  97 ; 01     SECTION      2 times    COLONOSCOPY N/A 2016    COLONOSCOPY performed by Mayco Caldwell MD at [facility] ENDOSCOPY    COLONOSCOPY      DILATION AND CURETTAGE OF UTERUS      3 times    HYSTERECTOMY (CERVIX STATUS UNKNOWN)  2022    MYOMECTOMY  6-96    MYOMECTOMY      MYOMECTOMY 1-4,W/TOT 250GMS/<,ABD APPRCH      UPPER GASTROINTESTINAL ENDOSCOPY        Family History   Problem Relation Age of Onset    Hypertension Mother     Cancer Mother         Uterine Cancer    Diabetes Mother     Colon Polyps Mother     Cancer Father         Brain Cancer    Diabetes Father     Cancer Maternal Aunt         Breast Cancer    Diabetes Maternal Uncle     Breast Cancer Maternal Grandmother     Cancer Maternal Grandmother         Breast Cancer    Heart Attack Maternal Grandfather

## 2025-03-18 ENCOUNTER — HOSPITAL ENCOUNTER (OUTPATIENT)
Facility: HOSPITAL | Age: 60
Setting detail: SPECIMEN
Discharge: HOME | End: 2025-03-21

## 2025-03-18 ENCOUNTER — HOSPITAL ENCOUNTER (OUTPATIENT)
Facility: HOSPITAL | Age: 60
Discharge: HOME | End: 2025-03-21
Payer: COMMERCIAL

## 2025-03-18 DIAGNOSIS — I10 PRIMARY HYPERTENSION: ICD-10-CM

## 2025-03-18 DIAGNOSIS — E11.9 TYPE 2 DIABETES MELLITUS WITHOUT COMPLICATION, WITHOUT LONG-TERM CURRENT USE OF INSULIN: ICD-10-CM

## 2025-03-18 LAB — SENTARA SPECIMEN COLLECTION: NORMAL

## 2025-03-18 PROCEDURE — 99001 SPECIMEN HANDLING PT-LAB: CPT

## 2025-03-18 PROCEDURE — 93005 ELECTROCARDIOGRAM TRACING: CPT

## 2025-03-19 LAB
A/G RATIO: 1.7 RATIO (ref 1.1–2.6)
ALBUMIN: 4.2 G/DL (ref 3.5–5)
ALP BLD-CCNC: 81 U/L (ref 40–120)
ALT SERPL-CCNC: 13 U/L (ref 5–40)
ANION GAP SERPL CALCULATED.3IONS-SCNC: 12 MMOL/L (ref 3–15)
AST SERPL-CCNC: 13 U/L (ref 10–37)
BILIRUB SERPL-MCNC: 0.2 MG/DL (ref 0.2–1.2)
BUN BLDV-MCNC: 14 MG/DL (ref 6–22)
CALCIUM SERPL-MCNC: 9.7 MG/DL (ref 8.4–10.5)
CHLORIDE BLD-SCNC: 103 MMOL/L (ref 98–110)
CO2: 27 MMOL/L (ref 20–32)
CREAT SERPL-MCNC: 0.6 MG/DL (ref 0.8–1.4)
EKG ATRIAL RATE: 72 BPM
EKG DIAGNOSIS: NORMAL
EKG P AXIS: -7 DEGREES
EKG P-R INTERVAL: 176 MS
EKG Q-T INTERVAL: 402 MS
EKG QRS DURATION: 90 MS
EKG QTC CALCULATION (BAZETT): 440 MS
EKG R AXIS: 44 DEGREES
EKG T AXIS: 67 DEGREES
EKG VENTRICULAR RATE: 72 BPM
GFR, ESTIMATED: >60 ML/MIN/1.73 SQ.M.
GLOBULIN: 2.5 G/DL (ref 2–4)
GLUCOSE: 94 MG/DL (ref 70–99)
POTASSIUM SERPL-SCNC: 4.2 MMOL/L (ref 3.5–5.5)
SODIUM BLD-SCNC: 142 MMOL/L (ref 133–145)
TOTAL PROTEIN: 6.7 G/DL (ref 6.2–8.1)

## 2025-03-20 ENCOUNTER — CLINICAL DOCUMENTATION (OUTPATIENT)
Facility: HOSPITAL | Age: 60
End: 2025-03-20

## 2025-03-20 NOTE — PROGRESS NOTES
3/20/25:  Patient did not show for her nutrition visit.  She was contacted and had a family emergency.  She has been rescheduled for March 27.    Janine Thompson MS RD.

## 2025-03-25 ENCOUNTER — ANESTHESIA EVENT (OUTPATIENT)
Facility: HOSPITAL | Age: 60
End: 2025-03-25
Payer: COMMERCIAL

## 2025-03-25 ENCOUNTER — TELEPHONE (OUTPATIENT)
Age: 60
End: 2025-03-25

## 2025-03-25 NOTE — TELEPHONE ENCOUNTER
LMOM confirming procedure date, arrival time and location information. I LM for the pt to call me back to confirm and go over prep.

## 2025-03-25 NOTE — TELEPHONE ENCOUNTER
Pt called back and confirmed colonoscopy date, arrival time and location. Pt had no questions regarding prep.

## 2025-03-26 ENCOUNTER — ANESTHESIA (OUTPATIENT)
Facility: HOSPITAL | Age: 60
End: 2025-03-26
Payer: COMMERCIAL

## 2025-03-26 ENCOUNTER — HOSPITAL ENCOUNTER (OUTPATIENT)
Facility: HOSPITAL | Age: 60
Setting detail: OUTPATIENT SURGERY
Discharge: HOME | End: 2025-03-26
Attending: COLON & RECTAL SURGERY | Admitting: COLON & RECTAL SURGERY
Payer: COMMERCIAL

## 2025-03-26 VITALS
OXYGEN SATURATION: 100 % | WEIGHT: 283 LBS | HEIGHT: 62 IN | SYSTOLIC BLOOD PRESSURE: 141 MMHG | RESPIRATION RATE: 18 BRPM | HEART RATE: 64 BPM | TEMPERATURE: 97.9 F | DIASTOLIC BLOOD PRESSURE: 82 MMHG | BODY MASS INDEX: 52.08 KG/M2

## 2025-03-26 LAB — GLUCOSE BLD STRIP.AUTO-MCNC: 101 MG/DL (ref 70–110)

## 2025-03-26 PROCEDURE — 2580000003 HC RX 258: Performed by: NURSE ANESTHETIST, CERTIFIED REGISTERED

## 2025-03-26 PROCEDURE — 2709999900 HC NON-CHARGEABLE SUPPLY: Performed by: COLON & RECTAL SURGERY

## 2025-03-26 PROCEDURE — 3700000000 HC ANESTHESIA ATTENDED CARE: Performed by: COLON & RECTAL SURGERY

## 2025-03-26 PROCEDURE — 6360000002 HC RX W HCPCS: Performed by: ANESTHESIOLOGY

## 2025-03-26 PROCEDURE — 45378 DIAGNOSTIC COLONOSCOPY: CPT | Performed by: COLON & RECTAL SURGERY

## 2025-03-26 PROCEDURE — 82962 GLUCOSE BLOOD TEST: CPT

## 2025-03-26 PROCEDURE — 7100000010 HC PHASE II RECOVERY - FIRST 15 MIN: Performed by: COLON & RECTAL SURGERY

## 2025-03-26 PROCEDURE — 3700000001 HC ADD 15 MINUTES (ANESTHESIA): Performed by: COLON & RECTAL SURGERY

## 2025-03-26 PROCEDURE — 3600007512: Performed by: COLON & RECTAL SURGERY

## 2025-03-26 PROCEDURE — 3600007502: Performed by: COLON & RECTAL SURGERY

## 2025-03-26 PROCEDURE — 7100000000 HC PACU RECOVERY - FIRST 15 MIN: Performed by: COLON & RECTAL SURGERY

## 2025-03-26 RX ORDER — DEXTROSE MONOHYDRATE 100 MG/ML
INJECTION, SOLUTION INTRAVENOUS CONTINUOUS PRN
Status: DISCONTINUED | OUTPATIENT
Start: 2025-03-26 | End: 2025-03-26 | Stop reason: HOSPADM

## 2025-03-26 RX ORDER — FAMOTIDINE 20 MG/1
20 TABLET, FILM COATED ORAL ONCE
Status: DISCONTINUED | OUTPATIENT
Start: 2025-03-26 | End: 2025-03-26 | Stop reason: HOSPADM

## 2025-03-26 RX ORDER — LIDOCAINE HYDROCHLORIDE 10 MG/ML
1 INJECTION, SOLUTION EPIDURAL; INFILTRATION; INTRACAUDAL; PERINEURAL
Status: DISCONTINUED | OUTPATIENT
Start: 2025-03-26 | End: 2025-03-26 | Stop reason: HOSPADM

## 2025-03-26 RX ORDER — SODIUM CHLORIDE, SODIUM LACTATE, POTASSIUM CHLORIDE, CALCIUM CHLORIDE 600; 310; 30; 20 MG/100ML; MG/100ML; MG/100ML; MG/100ML
INJECTION, SOLUTION INTRAVENOUS CONTINUOUS
Status: DISCONTINUED | OUTPATIENT
Start: 2025-03-26 | End: 2025-03-26 | Stop reason: HOSPADM

## 2025-03-26 RX ORDER — PROPOFOL 10 MG/ML
INJECTION, EMULSION INTRAVENOUS
Status: DISCONTINUED | OUTPATIENT
Start: 2025-03-26 | End: 2025-03-26 | Stop reason: SDUPTHER

## 2025-03-26 RX ADMIN — PROPOFOL 50 MG: 10 INJECTION, EMULSION INTRAVENOUS at 07:30

## 2025-03-26 RX ADMIN — SODIUM CHLORIDE, SODIUM LACTATE, POTASSIUM CHLORIDE, AND CALCIUM CHLORIDE: 600; 310; 30; 20 INJECTION, SOLUTION INTRAVENOUS at 07:05

## 2025-03-26 RX ADMIN — PROPOFOL 200 MG: 10 INJECTION, EMULSION INTRAVENOUS at 07:34

## 2025-03-26 SDOH — ECONOMIC STABILITY: TRANSPORTATION INSECURITY
IN THE PAST 12 MONTHS, HAS THE LACK OF TRANSPORTATION KEPT YOU FROM MEDICAL APPOINTMENTS OR FROM GETTING MEDICATIONS?: NO

## 2025-03-26 SDOH — ECONOMIC STABILITY: TRANSPORTATION INSECURITY
IN THE PAST 12 MONTHS, HAS LACK OF TRANSPORTATION KEPT YOU FROM MEETINGS, WORK, OR FROM GETTING THINGS NEEDED FOR DAILY LIVING?: NO

## 2025-03-26 SDOH — ECONOMIC STABILITY: FOOD INSECURITY: WITHIN THE PAST 12 MONTHS, YOU WORRIED THAT YOUR FOOD WOULD RUN OUT BEFORE YOU GOT MONEY TO BUY MORE.: NEVER TRUE

## 2025-03-26 SDOH — ECONOMIC STABILITY: FOOD INSECURITY: WITHIN THE PAST 12 MONTHS, THE FOOD YOU BOUGHT JUST DIDN'T LAST AND YOU DIDN'T HAVE MONEY TO GET MORE.: NEVER TRUE

## 2025-03-26 SDOH — ECONOMIC STABILITY: INCOME INSECURITY: IN THE LAST 12 MONTHS, WAS THERE A TIME WHEN YOU WERE NOT ABLE TO PAY THE MORTGAGE OR RENT ON TIME?: NO

## 2025-03-26 ASSESSMENT — PAIN - FUNCTIONAL ASSESSMENT
PAIN_FUNCTIONAL_ASSESSMENT: NONE - DENIES PAIN

## 2025-03-26 NOTE — H&P
HPI: Charisse Linares is a 60 y.o. female presenting with chief complain of need for crc screening.    Past Medical History:   Diagnosis Date    Allergic rhinitis     Anemia     Arthritis     BMI 45.0-49.9, adult     49.5    Conjunctivitis, bacterial 04    left    Diabetes mellitus (HCC)     Fibroids     GERD (gastroesophageal reflux disease)     High cholesterol     Hypercholesterolemia 2-    TSH 0.6    Hypertension     Knee pain, left     Osteoarthritis     Pelvic pain     Sleep apnea     USES CPAP    Uterine fibroid     Vitamin D deficiency        Past Surgical History:   Procedure Laterality Date     SECTION  97 ; 01     SECTION      2 times    COLONOSCOPY N/A 2016    COLONOSCOPY performed by Mayco Caldwell MD at [facility] ENDOSCOPY    COLONOSCOPY      DILATION AND CURETTAGE OF UTERUS      3 times    HYSTERECTOMY (CERVIX STATUS UNKNOWN)  2022    MYOMECTOMY  -    MYOMECTOMY      MYOMECTOMY 1-4,W/TOT 250GMS/<,ABD APPRCH      UPPER GASTROINTESTINAL ENDOSCOPY         Family History   Problem Relation Age of Onset    Hypertension Mother     Cancer Mother         Uterine Cancer    Diabetes Mother     Colon Polyps Mother     Cancer Father         Brain Cancer    Diabetes Father     Cancer Maternal Aunt         Breast Cancer    Diabetes Maternal Uncle     Breast Cancer Maternal Grandmother     Cancer Maternal Grandmother         Breast Cancer    Heart Attack Maternal Grandfather     Hypertension Paternal Grandmother     Heart Disease Paternal Grandfather     Cancer Cousin         breast       Social History     Socioeconomic History    Marital status:      Spouse name: None    Number of children: None    Years of education: None    Highest education level: None   Tobacco Use    Smoking status: Never     Passive exposure: Never    Smokeless tobacco: Never   Vaping Use    Vaping status: Never Used   Substance and Sexual Activity    Alcohol use: Yes     Alcohol/week: 2.0

## 2025-03-26 NOTE — OP NOTE
Colonoscopy Procedure Note      Charisse Linares  1965  [id number]                Date of Procedure: 03/26/25    Preoperative diagnosis: Screen    Postoperative diagnosis: Normal    :  Arben Tran MD    Assistant(s): Circulator: Elizabeth Yang RN; Christiana Elizabeth RN    Sedation: MAC    Complications: None    Implants: None    Procedure Details:  Prior to the procedure, a history and physical were performed. The patient’s medications, allergies and sensitivities were reviewed and all questions were answered.  After informed consent was obtained for the procedure, with all risks and benefits of procedure explained. The patient was taken to the endoscopy suite and placed in the left lateral decubitus position.  Patient identification and proposed procedure were verified prior to the procedure by the nurse and I. After sequential anesthesia administered by anesthesiologist, a digital rectal exam was performed and was normal.  The Olympus video colonoscope was introduced through the anus and advanced to cecum, which was identified by the ileocecal valve and appendiceal orifice.   The colonoscope was slowly withdrawn and the mucosa examined for any abnormalities.  Cecal withdrawal time was greater than 6 minutes. The patient tolerated the procedure well. There were no complications.    Bowel Prep Quality: excellent.     Findings/Interventions:   Polyps - none    EBL: none    Recommendations: -Repeat colonoscopy in 10 years   Resume normal medication(s).     Discharge Disposition:  Home  Arben Tran MD  3/26/2025  7:46 AM

## 2025-03-26 NOTE — ANESTHESIA POSTPROCEDURE EVALUATION
Department of Anesthesiology  Postprocedure Note    Patient: Charisse Linares  MRN: [id number]  YOB: 1965  Date of evaluation: 3/26/2025    Procedure Summary       Date: 03/26/25 Room / Location: [facility] ENDO 03 / [facility] ENDOSCOPY    Anesthesia Start: 0726 Anesthesia Stop:     Procedure: COLONOSCOPY DIAGNOSTIC (Abdomen) Diagnosis:       Colon cancer screening      (Colon cancer screening [Z12.11])    Surgeons: Arben Tran MD Responsible Provider: Konrad Hoff MD    Anesthesia Type: MAC ASA Status: 3            Anesthesia Type: No value filed.    Allison Phase I: Allison Score: 10    Allison Phase II:      Anesthesia Post Evaluation    Patient location during evaluation: bedside  Patient participation: complete - patient participated  Level of consciousness: awake  Airway patency: patent  Nausea & Vomiting: no nausea  Cardiovascular status: hemodynamically stable  Respiratory status: acceptable  Hydration status: euvolemic  Multimodal analgesia pain management approach  Pain management: adequate    No notable events documented.

## 2025-03-26 NOTE — ANESTHESIA PRE PROCEDURE
Department of Anesthesiology  Preprocedure Note       Name:  Charisse Linares   Age:  60 y.o.  :  1965                                          MRN:  [id number]         Date:  3/26/2025      Surgeon: Surgeon(s):  Arben Tran MD    Procedure: Procedure(s):  COLONOSCOPY DIAGNOSTIC    Medications prior to admission:   Prior to Admission medications    Medication Sig Start Date End Date Taking? Authorizing Provider   ibuprofen (ADVIL;MOTRIN) 800 MG tablet Take 1 tablet by mouth every 12 hours as needed for Pain With food 24  Yes Yolanda Youngblood MD   Semaglutide (RYBELSUS) 14 MG TABS Take 14 mg by mouth daily 24  Yes Yolanda Youngblood MD   Cyanocobalamin (VITAMIN B-12) 500 MCG SUBL Place under the tongue   Yes Provider, MD Augustina   simvastatin (ZOCOR) 10 MG tablet Take 1 tablet by mouth nightly 24  Yes Yolanda Youngblood MD   losartan-hydroCHLOROthiazide (HYZAAR) 100-12.5 MG per tablet Take 1 tablet by mouth once daily 24  Yes Yolanda Youngblood MD   amLODIPine (NORVASC) 5 MG tablet Take 1 tablet by mouth once daily 24  Yes Yolanda Youngblood MD   omeprazole (PRILOSEC) 40 MG delayed release capsule Take 1 capsule by mouth daily 24  Yes Yolanda Youngblood MD   vitamin D3 (CHOLECALCIFEROL) 125 MCG (5000 UT) TABS tablet Take 1 tablet by mouth daily   Yes Automatic Reconciliation, Ar       Current medications:    Current Facility-Administered Medications   Medication Dose Route Frequency Provider Last Rate Last Admin    lidocaine PF 1 % injection 1 mL  1 mL IntraDERmal Once PRN Julia Gandhi APRN - CRNA        famotidine (PEPCID) tablet 20 mg  20 mg Oral Once Julia Gandhi APRN - CRNA        lactated ringers infusion   IntraVENous Continuous Julia Gandhi APRN - CRNA 125 mL/hr at 25 0705 New Bag at 25 0705    glucose chewable tablet 16 g  4 tablet Oral PRN Julia Gandhi APRN - CRNA        dextrose bolus 10% 125 mL  125 mL IntraVENous PRN

## 2025-03-27 ENCOUNTER — CLINICAL DOCUMENTATION (OUTPATIENT)
Facility: HOSPITAL | Age: 60
End: 2025-03-27

## 2025-03-27 ENCOUNTER — TELEMEDICINE (OUTPATIENT)
Facility: CLINIC | Age: 60
End: 2025-03-27
Payer: COMMERCIAL

## 2025-03-27 ENCOUNTER — HOSPITAL ENCOUNTER (OUTPATIENT)
Facility: HOSPITAL | Age: 60
Discharge: HOME | End: 2025-03-30

## 2025-03-27 DIAGNOSIS — R80.0 ISOLATED PROTEINURIA WITHOUT SPECIFIC MORPHOLOGIC LESION: Primary | ICD-10-CM

## 2025-03-27 PROCEDURE — 99213 OFFICE O/P EST LOW 20 MIN: CPT | Performed by: FAMILY MEDICINE

## 2025-03-27 NOTE — PROGRESS NOTES
Patient online Questionnaires via [employer] for Social Determinants, [id number] & PHQ-9 filled out before their Virtual appointment with the provider, thank you.

## 2025-03-27 NOTE — PROGRESS NOTES
[facility]  [address], [address]    Patient's Name: Charisse Linares     Age: 60 y.o.  YOB: 1965     Sex: female     Session: 2 of  3  Revision:    Surgeon: Dr. Gallagher  Date: 3/27/2025  Office Use Only:  v    Height: 5 f 2   Weight:    283      Lbs.     BMI:    Pounds Lost since last month: 5                 Pounds Gained since last month: 0    Starting Weight: 288     Previous Month’s Weight: 288  Overall Pounds Lost: 5   Overall Pounds Gained: 0    Weight Loss: n/a  Patient is currently being prescribed  to help lose weight.  This is being prescribed by: .    Other:  Patient has been to a bariatric support group yet.    Does patient smoke? None    Current alcohol intake  Number of drinks at a time:  1-2  Number of times in a week: 1      Class Guidelines    Guidelines are reviewed with patient at the start of every class.    1. Patient understands that weight loss trial classes must be consecutive.  Patient understands if they miss a class, it is their responsibility to contact me to reschedule class.  I will reach out to patient after their first no show.  2.  Patient understands the expectations that weight maintenance/weight loss is expected during the classes.  Failure to demonstrate changes may result in one extra month of weight loss trial, followed by going back to see the surgeon.  Patient understands that they CAN NOT gain any weight during the weight loss trial.  Gaining weight will result in extra classes.  3. Patient is also instructed to be doing their labs, blood work, psych visit, support group and any other test that the surgeon has used while they are working on their weight loss trial.  4.  Patient was instructed to bring their blue binder to every class and appointment.      Other Pertinent Information:     Changes Made Since Last Class: Trying to make better choices    Eating Habits and

## 2025-03-27 NOTE — PROGRESS NOTES
Charisse Linares, was evaluated through a synchronous (real-time) audio-video encounter. The patient (or guardian if applicable) is aware that this is a billable service, which includes applicable co-pays. This Virtual Visit was conducted with patient's (and/or legal guardian's) consent. Patient identification was verified, and a caregiver was present when appropriate.   The patient was located at Home: [address] [place] [id number]  Provider was located at Facility (Appt Dept): [address], [facility] [address]  [place] [address]  Confirm you are appropriately licensed, registered, or certified to deliver care in the [facility] where the patient is located as indicated above. If you are not or unsure, please re-schedule the visit: Yes, I confirm.     Charisse Linares (:  1965) is a Established patient, presenting virtually for evaluation of the following:    Below is the assessment and plan developed based on review of pertinent history, physical exam, labs, studies, and medications.     --Rachel Khoury MA

## 2025-03-28 NOTE — PROGRESS NOTES
Instructions for your surgery at       Today's Date:  3/28/2025      Patient's Name:  Charisse Linares           Surgery Date:  04/04/2025              Please enter the main entrance of the hospital and check-in at the front security desk located in the lobby. They will direct you to the area to report for your surgery.     Do NOT eat or drink anything, including candy, gum, or ice chips after midnight prior to your surgery, unless you have specific instructions from your surgeon or anesthesia provider to do so.  Brush your teeth before coming to the hospital. You may swish with water, but do not swallow.  No smoking/Vaping/E-Cigarettes 24 hours prior to the day of surgery.  No alcohol 24 hours prior to the day of surgery.  No recreational drugs for one week prior to the day of surgery.  Bring Photo ID, Insurance information, and Co-pay if required on day of surgery.  Bring in pertinent legal documents, such as, Medical Power of , DNR, Advance Directive, etc.  Leave all valuables, including money/purse, weapons at home.  Remove all jewelry, including ALL body piercings, nail polish, acrylic nails, and makeup (including mascara); no lotions, powders, deodorant, or perfume/cologne/after shave on the skin.  Follow instruction for Hibiclens washes and CHG wipes from surgeon's office.   Glasses and dentures may be worn to the hospital. They must be removed prior to surgery. Please bring case/container for glasses or dentures.   Contact lenses should not be worn on day of surgery.   Call your doctor's office if symptoms of a cold or illness develop within 24-48 hours prior to your surgery.  Call your doctor's office if you have any questions concerning insurance or co-pays.  15. AN ADULT (relative or friend 18 years or older) MUST DRIVE YOU HOME AFTER YOUR SURGERY.  16. Please make arrangements for a responsible adult (18 years or older) to be with you for 24 hours after your  surgery.   17. TWO VISITORS will be allowed in the waiting area during your surgery.  Exceptions may be made for surgical admissions, per nursing unit guidelines      Special Instructions:      Bring a list of CURRENT medications.    Follow instructions from the office regarding medications to take the morning of surgery.   Bring inhaler.  Bring CPAP machine.      If you have a history of recreational drug use, you may be required to submit a urine sample for drug testing the day of your procedure, as some recreational drugs can interact with anesthetics and increase your surgical risk.    On day of surgery if you are running late, unable to make procedure time, or sick, please call the Pre-op department at [phone]    These surgical instructions were reviewed with Charisse Linares during the PAT phone call.

## 2025-04-02 ENCOUNTER — HOSPITAL ENCOUNTER (OUTPATIENT)
Facility: HOSPITAL | Age: 60
Setting detail: SPECIMEN
Discharge: HOME | End: 2025-04-05

## 2025-04-02 ENCOUNTER — LAB (OUTPATIENT)
Facility: CLINIC | Age: 60
End: 2025-04-02

## 2025-04-02 DIAGNOSIS — I10 PRIMARY HYPERTENSION: ICD-10-CM

## 2025-04-02 DIAGNOSIS — Z12.11 COLON CANCER SCREENING: ICD-10-CM

## 2025-04-02 DIAGNOSIS — E55.9 VITAMIN D DEFICIENCY, UNSPECIFIED: ICD-10-CM

## 2025-04-02 DIAGNOSIS — E11.65 TYPE 2 DIABETES MELLITUS WITH HYPERGLYCEMIA, WITHOUT LONG-TERM CURRENT USE OF INSULIN (HCC): ICD-10-CM

## 2025-04-02 PROCEDURE — 99001 SPECIMEN HANDLING PT-LAB: CPT

## 2025-04-03 ENCOUNTER — ANESTHESIA EVENT (OUTPATIENT)
Facility: HOSPITAL | Age: 60
End: 2025-04-03
Payer: COMMERCIAL

## 2025-04-03 PROBLEM — Z12.11 COLON CANCER SCREENING: Status: RESOLVED | Noted: 2025-03-04 | Resolved: 2025-04-03

## 2025-04-03 LAB
A/G RATIO: 1.7 RATIO (ref 1.1–2.6)
ALBUMIN: 4.3 G/DL (ref 3.5–5)
ALP BLD-CCNC: 78 U/L (ref 40–120)
ALT SERPL-CCNC: 19 U/L (ref 5–40)
ANION GAP SERPL CALCULATED.3IONS-SCNC: 13 MMOL/L (ref 3–15)
AST SERPL-CCNC: 18 U/L (ref 10–37)
BILIRUB SERPL-MCNC: 0.2 MG/DL (ref 0.2–1.2)
BUN BLDV-MCNC: 12 MG/DL (ref 6–22)
CALCIUM SERPL-MCNC: 9.2 MG/DL (ref 8.4–10.5)
CHLORIDE BLD-SCNC: 104 MMOL/L (ref 98–110)
CHOLESTEROL, TOTAL: 137 MG/DL (ref 110–200)
CHOLESTEROL/HDL RATIO: 3.1 (ref 0–5)
CO2: 26 MMOL/L (ref 20–32)
CREAT SERPL-MCNC: 0.6 MG/DL (ref 0.8–1.4)
ESTIMATED AVERAGE GLUCOSE: 123 MG/DL (ref 91–123)
GFR, ESTIMATED: >60 ML/MIN/1.73 SQ.M.
GLOBULIN: 2.5 G/DL (ref 2–4)
GLUCOSE: 95 MG/DL (ref 70–99)
HBA1C MFR BLD: 5.9 % (ref 4.8–5.6)
HDLC SERPL-MCNC: 44 MG/DL
LDL CHOLESTEROL: 71 MG/DL (ref 50–99)
LDL/HDL RATIO: 1.6
NON-HDL CHOLESTEROL: 93 MG/DL
POTASSIUM SERPL-SCNC: 3.7 MMOL/L (ref 3.5–5.5)
SENTARA SPECIMEN COLLECTION: NORMAL
SODIUM BLD-SCNC: 143 MMOL/L (ref 133–145)
TOTAL PROTEIN: 6.8 G/DL (ref 6.2–8.1)
TRIGL SERPL-MCNC: 109 MG/DL (ref 40–149)
TSH SERPL DL<=0.05 MIU/L-ACNC: 0.61 MCU/ML (ref 0.27–4.2)
VITAMIN D 25-HYDROXY: 55.8 NG/ML (ref 32–100)
VLDLC SERPL CALC-MCNC: 22 MG/DL (ref 8–30)

## 2025-04-03 RX ORDER — SODIUM CHLORIDE 0.9 % (FLUSH) 0.9 %
5-40 SYRINGE (ML) INJECTION EVERY 12 HOURS SCHEDULED
Status: CANCELLED | OUTPATIENT
Start: 2025-04-03

## 2025-04-03 RX ORDER — SODIUM CHLORIDE 0.9 % (FLUSH) 0.9 %
5-40 SYRINGE (ML) INJECTION PRN
Status: CANCELLED | OUTPATIENT
Start: 2025-04-03

## 2025-04-03 RX ORDER — SODIUM CHLORIDE 9 MG/ML
INJECTION, SOLUTION INTRAVENOUS PRN
Status: CANCELLED | OUTPATIENT
Start: 2025-04-03

## 2025-04-04 ENCOUNTER — RESULTS FOLLOW-UP (OUTPATIENT)
Facility: CLINIC | Age: 60
End: 2025-04-04

## 2025-04-04 ENCOUNTER — HOSPITAL ENCOUNTER (OUTPATIENT)
Facility: HOSPITAL | Age: 60
Setting detail: OUTPATIENT SURGERY
Discharge: HOME | End: 2025-04-04
Attending: STUDENT IN AN ORGANIZED HEALTH CARE EDUCATION/TRAINING PROGRAM | Admitting: STUDENT IN AN ORGANIZED HEALTH CARE EDUCATION/TRAINING PROGRAM
Payer: COMMERCIAL

## 2025-04-04 ENCOUNTER — ANESTHESIA (OUTPATIENT)
Facility: HOSPITAL | Age: 60
End: 2025-04-04
Payer: COMMERCIAL

## 2025-04-04 VITALS
TEMPERATURE: 97.9 F | SYSTOLIC BLOOD PRESSURE: 123 MMHG | HEART RATE: 64 BPM | DIASTOLIC BLOOD PRESSURE: 76 MMHG | BODY MASS INDEX: 51.16 KG/M2 | OXYGEN SATURATION: 100 % | WEIGHT: 278 LBS | HEIGHT: 62 IN | RESPIRATION RATE: 20 BRPM

## 2025-04-04 PROBLEM — K31.7 GASTRIC POLYP: Status: ACTIVE | Noted: 2025-04-04

## 2025-04-04 PROBLEM — K21.9 GASTROESOPHAGEAL REFLUX DISEASE WITHOUT ESOPHAGITIS: Status: ACTIVE | Noted: 2025-04-04

## 2025-04-04 LAB
GLUCOSE BLD STRIP.AUTO-MCNC: 85 MG/DL (ref 70–110)
GLUCOSE BLD STRIP.AUTO-MCNC: 97 MG/DL (ref 70–110)

## 2025-04-04 PROCEDURE — 2500000003 HC RX 250 WO HCPCS: Performed by: NURSE ANESTHETIST, CERTIFIED REGISTERED

## 2025-04-04 PROCEDURE — 82962 GLUCOSE BLOOD TEST: CPT

## 2025-04-04 PROCEDURE — 3700000001 HC ADD 15 MINUTES (ANESTHESIA): Performed by: STUDENT IN AN ORGANIZED HEALTH CARE EDUCATION/TRAINING PROGRAM

## 2025-04-04 PROCEDURE — 7100000010 HC PHASE II RECOVERY - FIRST 15 MIN: Performed by: STUDENT IN AN ORGANIZED HEALTH CARE EDUCATION/TRAINING PROGRAM

## 2025-04-04 PROCEDURE — 7100000011 HC PHASE II RECOVERY - ADDTL 15 MIN: Performed by: STUDENT IN AN ORGANIZED HEALTH CARE EDUCATION/TRAINING PROGRAM

## 2025-04-04 PROCEDURE — 43251 EGD REMOVE LESION SNARE: CPT | Performed by: STUDENT IN AN ORGANIZED HEALTH CARE EDUCATION/TRAINING PROGRAM

## 2025-04-04 PROCEDURE — 7100000001 HC PACU RECOVERY - ADDTL 15 MIN: Performed by: STUDENT IN AN ORGANIZED HEALTH CARE EDUCATION/TRAINING PROGRAM

## 2025-04-04 PROCEDURE — 2580000003 HC RX 258: Performed by: NURSE ANESTHETIST, CERTIFIED REGISTERED

## 2025-04-04 PROCEDURE — 3700000000 HC ANESTHESIA ATTENDED CARE: Performed by: STUDENT IN AN ORGANIZED HEALTH CARE EDUCATION/TRAINING PROGRAM

## 2025-04-04 PROCEDURE — 3600007502: Performed by: STUDENT IN AN ORGANIZED HEALTH CARE EDUCATION/TRAINING PROGRAM

## 2025-04-04 PROCEDURE — 3600007512: Performed by: STUDENT IN AN ORGANIZED HEALTH CARE EDUCATION/TRAINING PROGRAM

## 2025-04-04 PROCEDURE — 2709999900 HC NON-CHARGEABLE SUPPLY: Performed by: STUDENT IN AN ORGANIZED HEALTH CARE EDUCATION/TRAINING PROGRAM

## 2025-04-04 PROCEDURE — 88305 TISSUE EXAM BY PATHOLOGIST: CPT

## 2025-04-04 PROCEDURE — 43239 EGD BIOPSY SINGLE/MULTIPLE: CPT | Performed by: STUDENT IN AN ORGANIZED HEALTH CARE EDUCATION/TRAINING PROGRAM

## 2025-04-04 PROCEDURE — 6360000002 HC RX W HCPCS: Performed by: NURSE ANESTHETIST, CERTIFIED REGISTERED

## 2025-04-04 PROCEDURE — 7100000000 HC PACU RECOVERY - FIRST 15 MIN: Performed by: STUDENT IN AN ORGANIZED HEALTH CARE EDUCATION/TRAINING PROGRAM

## 2025-04-04 RX ORDER — SODIUM CHLORIDE 0.9 % (FLUSH) 0.9 %
5-40 SYRINGE (ML) INJECTION PRN
Status: DISCONTINUED | OUTPATIENT
Start: 2025-04-04 | End: 2025-04-04 | Stop reason: HOSPADM

## 2025-04-04 RX ORDER — SODIUM CHLORIDE, SODIUM LACTATE, POTASSIUM CHLORIDE, CALCIUM CHLORIDE 600; 310; 30; 20 MG/100ML; MG/100ML; MG/100ML; MG/100ML
INJECTION, SOLUTION INTRAVENOUS
Status: DISCONTINUED | OUTPATIENT
Start: 2025-04-04 | End: 2025-04-04 | Stop reason: SDUPTHER

## 2025-04-04 RX ORDER — SODIUM CHLORIDE 9 MG/ML
INJECTION, SOLUTION INTRAVENOUS PRN
Status: DISCONTINUED | OUTPATIENT
Start: 2025-04-04 | End: 2025-04-04 | Stop reason: HOSPADM

## 2025-04-04 RX ORDER — SODIUM CHLORIDE 0.9 % (FLUSH) 0.9 %
5-40 SYRINGE (ML) INJECTION EVERY 12 HOURS SCHEDULED
Status: DISCONTINUED | OUTPATIENT
Start: 2025-04-04 | End: 2025-04-04 | Stop reason: HOSPADM

## 2025-04-04 RX ORDER — LIDOCAINE HYDROCHLORIDE 20 MG/ML
INJECTION, SOLUTION EPIDURAL; INFILTRATION; INTRACAUDAL; PERINEURAL
Status: DISCONTINUED | OUTPATIENT
Start: 2025-04-04 | End: 2025-04-04 | Stop reason: SDUPTHER

## 2025-04-04 RX ORDER — SODIUM CHLORIDE, SODIUM LACTATE, POTASSIUM CHLORIDE, CALCIUM CHLORIDE 600; 310; 30; 20 MG/100ML; MG/100ML; MG/100ML; MG/100ML
INJECTION, SOLUTION INTRAVENOUS CONTINUOUS
Status: DISCONTINUED | OUTPATIENT
Start: 2025-04-04 | End: 2025-04-04 | Stop reason: HOSPADM

## 2025-04-04 RX ORDER — LIDOCAINE HYDROCHLORIDE 10 MG/ML
1 INJECTION, SOLUTION EPIDURAL; INFILTRATION; INTRACAUDAL; PERINEURAL
Status: DISCONTINUED | OUTPATIENT
Start: 2025-04-04 | End: 2025-04-04 | Stop reason: HOSPADM

## 2025-04-04 RX ADMIN — LIDOCAINE HYDROCHLORIDE 100 MG: 20 SOLUTION INTRAVENOUS at 13:40

## 2025-04-04 RX ADMIN — SODIUM CHLORIDE, SODIUM LACTATE, POTASSIUM CHLORIDE, AND CALCIUM CHLORIDE: 600; 310; 30; 20 INJECTION, SOLUTION INTRAVENOUS at 13:34

## 2025-04-04 RX ADMIN — PROPOFOL 100 MG: 10 INJECTION, EMULSION INTRAVENOUS at 13:40

## 2025-04-04 RX ADMIN — PROPOFOL 50 MG: 10 INJECTION, EMULSION INTRAVENOUS at 13:44

## 2025-04-04 RX ADMIN — PROPOFOL 100 MG: 10 INJECTION, EMULSION INTRAVENOUS at 13:46

## 2025-04-04 RX ADMIN — PROPOFOL 50 MG: 10 INJECTION, EMULSION INTRAVENOUS at 13:42

## 2025-04-04 RX ADMIN — DEXMEDETOMIDINE 12 MCG: 200 INJECTION, SOLUTION INTRAVENOUS at 13:40

## 2025-04-04 RX ADMIN — SODIUM CHLORIDE, SODIUM LACTATE, POTASSIUM CHLORIDE, AND CALCIUM CHLORIDE: .6; .31; .03; .02 INJECTION, SOLUTION INTRAVENOUS at 12:18

## 2025-04-04 ASSESSMENT — PAIN SCALES - GENERAL
PAINLEVEL_OUTOF10: 0

## 2025-04-04 NOTE — H&P
history of high blood pressure and high cholesterol.  She takes ibuprofen for knee pain a few times a week, either in the morning or at night.     SOCIAL HISTORY  She drinks alcohol occasionally.     FAMILY HISTORY  Her mother had bariatric surgery around the age of 58 or 59 and is currently in her 70s.     MEDICATIONS  Current: Rybelsus, ibuprofen, omeprazole  Past: Ozempic, phentermine     STOP-BANG score: Prior history of BRITTON on CPAP as per HPI    EGD/UGI: EGD many years prior, she cannot specifically recall the results and these are not available for my review.  GERD-HRQL score: GERD-HRQL  Are you currently taking any medications for GERD symptoms?: Yes (2/11/2025 11:49 PM)  How bad is the heartburn?: 0 (2/11/2025 11:49 PM)  Heartburn when lying down?: 0 (2/11/2025 11:49 PM)  Heartburn when standing up?: 0 (2/11/2025 11:49 PM)  Heartburn after meals?: 0 (2/11/2025 11:49 PM)  Does heartburn change your diet?: 0 (2/11/2025 11:49 PM)  Does heartburn wake you from sleep?: 0 (2/11/2025 11:49 PM)  Do you have difficulty swallowing?: 0 (2/11/2025 11:49 PM)  Do you have pain with swallowing?: 0 (2/11/2025 11:49 PM)  If you take medication, does this affect your daily life?: 0 (2/11/2025 11:49 PM)   How bad is the regurgitation?: 0 (2/11/2025 11:49 PM)  Regurgitation when lying down?: 0 (2/11/2025 11:49 PM)  Regurgitation when standing up?: 0 (2/11/2025 11:49 PM)  Regurgitation after meals?: 0 (2/11/2025 11:49 PM)  Does regurgitation change your diet?: 0 (2/11/2025 11:49 PM)  Does regurgitation wake you from sleep?: 0 (2/11/2025 11:49 PM)  How satisfied are you with your present condition?: Satisfied (2/11/2025 11:49 PM)  GERD-HRQL TOTAL SCORE: 0 (2/11/2025 11:49 PM)  GERD-HRQL HEARTBURN SCORE: 0 (2/11/2025 11:49 PM)  GERD-HRQL REGURGITATION SCORE: 0 (2/11/2025 11:49 PM)           Cancer Screenings:  No cervical cancer screening on file  Date of last Mammogram: 6/5/2024  Date of last Colonoscopy: 11/23/2016  No cologuard  obese female with a current Body mass index is 52.77 kg/m². who is considering bariatric surgery, and would be an appropriate candidate for enrollment in the preoperative pathway.      The patient is considering Laparoscopic Gastric Bypass for surgical weight loss due to their ineffective progress with medical forms of weight loss and the urging of their physicians who care for their primary medical issues. The patient  now presents  for consideration for weight loss surgery understanding the benefits of this over a medical approach of weight loss as was discussed in our seminar on weight loss surgery. They have discussed their plans both with their family and primary care physician who is in support of their pursuit of such.     The patient's goal weight is 185 lb.   These goals are consistent with expected outcomes of their desired operation.  Her Medical goals are resolution of these health issues.     The possible short and long term  complications of the gastric bypass were also discussed, to include but not limited to;death, DVT/PE, staple line leak, bleeding, stricture formation, surgical site infection, internal hernia  and pouch dilation.  Specific weight related outcomes for success were also discussed with an emphasis on careful and close follow-up with the first year and dietary behavior modification over the first years as baseline cyclical hunger returns  The patient expressed an understanding of the above factors, and her questions were answered in their entirety.        Plan:      Will plan to enroll in the preoperative bariatric pathway  Will schedule consultation with our dietitian  Will schedule for preoperative psych clearance  Standard preoperative bariatric lab panel ordered  Scheduled for EGD  Return to clinic for midpoint evaluation

## 2025-04-04 NOTE — PROGRESS NOTES
3/27/2025    TELEHEALTH EVALUATION -- Audio/Visual    HPI:    Charisse Linares (:  1965) has requested an audio/video evaluation for the following concern(s):  History of Present Illness  The patient presents for evaluation of proteinuria.      Patient was recently seen at an urgent care center for an uncharacteristic urge to urinate upon awakening, leading to a suspicion of a urinary tract infection (UTI).  She was prescribed an antibiotic but subsequent laboratory results indicated the absence of a UTI, and she was advised to discontinue the prescribed medication. Despite this advice, she continued the medication regimen as it was nearing completion and appeared to alleviate her symptoms. A urine test revealed excessive protein levels, which led her to stop her daily intake of protein shakes, a habit maintained for approximately 2 years. She denies dysuria.    Recent blood work conducted on 2025 also indicated a low creatinine.  This concerned her and therefore she made this appointment with to discuss the results.          Review of Systems as per hpi    Prior to Visit Medications    Medication Sig Taking? Authorizing Provider   Multiple Vitamins-Minerals (CENTRUM SILVER 50+WOMEN PO) Take by mouth Yes Augustina Benavidez MD   ibuprofen (ADVIL;MOTRIN) 800 MG tablet Take 1 tablet by mouth every 12 hours as needed for Pain With food Yes Yolanda Youngblood MD   Semaglutide (RYBELSUS) 14 MG TABS Take 14 mg by mouth daily Yes Yolanda Youngblood MD   Cyanocobalamin (VITAMIN B-12) 500 MCG SUBL Place under the tongue Yes Augustina Benavidez MD   simvastatin (ZOCOR) 10 MG tablet Take 1 tablet by mouth nightly Yes Yolanda Youngblood MD   losartan-hydroCHLOROthiazide (HYZAAR) 100-12.5 MG per tablet Take 1 tablet by mouth once daily Yes Yolanda Youngblood MD   amLODIPine (NORVASC) 5 MG tablet Take 1 tablet by mouth once daily Yes Yolanda Youngblood MD   omeprazole (PRILOSEC) 40 MG delayed release capsule

## 2025-04-04 NOTE — DISCHARGE SUMMARY
Bariatric Surgery Endoscopy Progress Note    Admission Date: 4/4/2025    Discharge Date: 4/4/2025    Admission Diagnosis: GERD    Discharge Diagnosis: GERD     Procedures: EGD with biopsy    Postop Complications: None    Hospital Course:  Patient was admitted on 4/4/2025 for scheduled outpatient endoscopy.  Operation was without significant complication.   Patient was stable postoperatively and tolerating liquids in the recovery area. All vitals were normal and she emerged from anesthesia without incident and subsequently discharged home.    Discharge Diet:  Continue liquid diet and advance to regular as tolerated    Discharge Medications:     Medication List        CONTINUE taking these medications      amLODIPine 5 MG tablet  Commonly known as: NORVASC  Take 1 tablet by mouth once daily     CENTRUM SILVER 50+WOMEN PO     ibuprofen 800 MG tablet  Commonly known as: ADVIL;MOTRIN  Take 1 tablet by mouth every 12 hours as needed for Pain With food     losartan-hydroCHLOROthiazide 100-12.5 MG per tablet  Commonly known as: HYZAAR  Take 1 tablet by mouth once daily     omeprazole 40 MG delayed release capsule  Commonly known as: PRILOSEC  Take 1 capsule by mouth daily     Rybelsus 14 MG Tabs  Generic drug: Semaglutide  Take 14 mg by mouth daily     simvastatin 10 MG tablet  Commonly known as: ZOCOR  Take 1 tablet by mouth nightly     Vitamin B-12 500 MCG Subl     vitamin D3 125 MCG (5000 UT) Tabs tablet  Commonly known as: CHOLECALCIFEROL                Discharge disposition: home    Discharge condition: stable      Local wound care: None     Activity: as desired      Special Instructions:            - No driving the day of your procedure            - If a biopsy was taken, avoid aspirin or NSAIDs for 48 hours.            - Your results will be available for discussion at your next appointment            - Notify [facility] for a Temp >100.5 or Pulse>115     Follow-up with your surgeon in as  directed, or call office for appointment  [phone]

## 2025-04-04 NOTE — DISCHARGE INSTRUCTIONS
Upper GI Endoscopy: What to Expect at Home    Your Recovery    You had an upper GI endoscopy. Your doctor used a thin, lighted tube that bends to look at the inside of your esophagus, your stomach, and the first part of the small intestine, called the duodenum.    After you have an endoscopy, you will stay at the hospital or clinic for 1 to 2 hours. This will allow the medicine to wear off. You will be able to go home after your doctor or nurse checks to make sure that you're not having any problems.    You may have to stay overnight if you had treatment during the test. You may have a sore throat for a day or two after the test.    This care sheet gives you a general idea about what to expect after the test.    How can you care for yourself at home?    Activity   Rest as much as you need to after you go home.  You should be able to go back to your usual activities the day after the test.    Diet   Follow your doctor's directions for eating after the test.  Drink plenty of fluids (unless your doctor has told you not to).    Medications   If you have a sore throat the day after the test, use an over-the-counter spray to numb your throat.    Results  Your doctor may have taken biopsies during your endoscopy. We will either call you with the results or discuss them with you at your follow up visit. If a biopsy was taken, avoid aspirin, ibuprofen, or other NSAIDs for 48 hours.    Follow-up care is a key part of your treatment and safety. Be sure to make and go to all appointments, and call your doctor if you are having problems. It's also a good idea to know your test results and keep a list of the medicines you take.    When should you call for help?   Call 911 anytime you think you may need emergency care. For example, call if:    You passed out (lost consciousness).     You have trouble breathing.     You pass maroon or bloody stools.   Call your doctor now or seek immediate medical care if:    You have pain that  a healthy lifestyle    You may be retaining fluid if you have a history of heart failure or if you experience any of the following symptoms:  Weight gain of 3 pounds or more overnight or 5 pounds in a week, increased swelling in our hands or feet or shortness of breath while lying flat in bed.  Please call your doctor as soon as you notice any of these symptoms; do not wait until your next office visit.        The discharge information has been reviewed with the {PATIENT PARENT GUARDIAN:14485}.  The {PATIENT PARENT GUARDIAN:05345} verbalized understanding.  Discharge medications reviewed with the {Dishcarge meds reviewed with:[id number]} and appropriate educational materials and side effects teaching were provided.  ___________________________________________________________________________________________________________________________________

## 2025-04-04 NOTE — ANESTHESIA POSTPROCEDURE EVALUATION
Department of Anesthesiology  Postprocedure Note    Patient: Charisse Linares  MRN: [id number]  YOB: 1965  Date of evaluation: 4/4/2025    Procedure Summary       Date: 04/04/25 Room / Location: [facility] ENDO 01 / [facility] ENDOSCOPY    Anesthesia Start: 1334 Anesthesia Stop:     Procedure: ESOPHAGOGASTRODUODENOSCOPY BIOPSY (Upper GI Region) Diagnosis:       Morbid obesity      (Morbid obesity [E66.01])    Surgeons: Efrain Gallagher MD Responsible Provider: Janee Diaz MD    Anesthesia Type: MAC ASA Status: 3            Anesthesia Type: No value filed.    Allison Phase I: Allison Score: 10    Allison Phase II:      Anesthesia Post Evaluation    Patient location during evaluation: PACU  Patient participation: complete - patient participated  Level of consciousness: awake and alert  Pain score: 0  Airway patency: patent  Nausea & Vomiting: no nausea and no vomiting  Cardiovascular status: hemodynamically stable  Respiratory status: acceptable  Hydration status: euvolemic  Multimodal analgesia pain management approach  Pain management: adequate    No notable events documented.

## 2025-04-04 NOTE — ANESTHESIA PRE PROCEDURE
Department of Anesthesiology  Preprocedure Note       Name:  Charisse Linares   Age:  60 y.o.  :  1965                                          MRN:  [id number]         Date:  2025      Surgeon: Surgeon(s):  Efrain Gallagher MD    Procedure: Procedure(s):  ESOPHAGOGASTRODUODENOSCOPY BIOPSY    Medications prior to admission:   Prior to Admission medications    Medication Sig Start Date End Date Taking? Authorizing Provider   Multiple Vitamins-Minerals (CENTRUM SILVER 50+WOMEN PO) Take by mouth   Yes Augustina Benavidez MD   ibuprofen (ADVIL;MOTRIN) 800 MG tablet Take 1 tablet by mouth every 12 hours as needed for Pain With food 24  Yes Yolanda Youngblood MD   Cyanocobalamin (VITAMIN B-12) 500 MCG SUBL Place under the tongue   Yes Augustina Benavidez MD   amLODIPine (NORVASC) 5 MG tablet Take 1 tablet by mouth once daily 24  Yes Yolanda Youngblood MD   omeprazole (PRILOSEC) 40 MG delayed release capsule Take 1 capsule by mouth daily 24  Yes Yolanda Youngblood MD   vitamin D3 (CHOLECALCIFEROL) 125 MCG (5000 UT) TABS tablet Take 1 tablet by mouth daily   Yes Automatic Reconciliation, Ar   Semaglutide (RYBELSUS) 14 MG TABS Take 14 mg by mouth daily 24   Yolanda Youngblood MD   simvastatin (ZOCOR) 10 MG tablet Take 1 tablet by mouth nightly 24   Yolanda Youngblood MD   losartan-hydroCHLOROthiazide (HYZAAR) 100-12.5 MG per tablet Take 1 tablet by mouth once daily 24   Yolanda Youngblood MD       Current medications:    Current Facility-Administered Medications   Medication Dose Route Frequency Provider Last Rate Last Admin   • lactated ringers infusion   IntraVENous Continuous Julia Cruz APRN - CRNA 125 mL/hr at 25 1218 New Bag at 25 1218   • lidocaine PF 1 % injection 1 mL  1 mL IntraDERmal Once PRN Julia Cruz APRN - CRNA       • sodium chloride flush 0.9 % injection 5-40 mL  5-40 mL IntraVENous 2 times per day Efrain Gallagher MD       •

## 2025-04-09 ENCOUNTER — OFFICE VISIT (OUTPATIENT)
Facility: CLINIC | Age: 60
End: 2025-04-09
Payer: COMMERCIAL

## 2025-04-09 VITALS
SYSTOLIC BLOOD PRESSURE: 127 MMHG | HEART RATE: 66 BPM | OXYGEN SATURATION: 98 % | TEMPERATURE: 97.5 F | BODY MASS INDEX: 51.38 KG/M2 | RESPIRATION RATE: 18 BRPM | DIASTOLIC BLOOD PRESSURE: 86 MMHG | WEIGHT: 279.2 LBS | HEIGHT: 62 IN

## 2025-04-09 DIAGNOSIS — E11.65 TYPE 2 DIABETES MELLITUS WITH HYPERGLYCEMIA, WITHOUT LONG-TERM CURRENT USE OF INSULIN (HCC): Primary | ICD-10-CM

## 2025-04-09 DIAGNOSIS — I10 PRIMARY HYPERTENSION: ICD-10-CM

## 2025-04-09 DIAGNOSIS — E55.9 VITAMIN D DEFICIENCY: ICD-10-CM

## 2025-04-09 PROCEDURE — 3079F DIAST BP 80-89 MM HG: CPT | Performed by: FAMILY MEDICINE

## 2025-04-09 PROCEDURE — 3044F HG A1C LEVEL LT 7.0%: CPT | Performed by: FAMILY MEDICINE

## 2025-04-09 PROCEDURE — 3074F SYST BP LT 130 MM HG: CPT | Performed by: FAMILY MEDICINE

## 2025-04-09 PROCEDURE — 99214 OFFICE O/P EST MOD 30 MIN: CPT | Performed by: FAMILY MEDICINE

## 2025-04-09 RX ORDER — ACETAMINOPHEN 500 MG
500 TABLET ORAL EVERY 6 HOURS PRN
COMMUNITY

## 2025-04-09 ASSESSMENT — ANXIETY QUESTIONNAIRES
4. TROUBLE RELAXING: NOT AT ALL
5. BEING SO RESTLESS THAT IT IS HARD TO SIT STILL: NOT AT ALL
7. FEELING AFRAID AS IF SOMETHING AWFUL MIGHT HAPPEN: NOT AT ALL
IF YOU CHECKED OFF ANY PROBLEMS ON THIS QUESTIONNAIRE, HOW DIFFICULT HAVE THESE PROBLEMS MADE IT FOR YOU TO DO YOUR WORK, TAKE CARE OF THINGS AT HOME, OR GET ALONG WITH OTHER PEOPLE: NOT DIFFICULT AT ALL
3. WORRYING TOO MUCH ABOUT DIFFERENT THINGS: NOT AT ALL
1. FEELING NERVOUS, ANXIOUS, OR ON EDGE: NOT AT ALL
GAD7 TOTAL SCORE: 0
6. BECOMING EASILY ANNOYED OR IRRITABLE: NOT AT ALL
2. NOT BEING ABLE TO STOP OR CONTROL WORRYING: NOT AT ALL

## 2025-04-09 ASSESSMENT — PATIENT HEALTH QUESTIONNAIRE - PHQ9
SUM OF ALL RESPONSES TO PHQ QUESTIONS 1-9: 0
1. LITTLE INTEREST OR PLEASURE IN DOING THINGS: NOT AT ALL
SUM OF ALL RESPONSES TO PHQ QUESTIONS 1-9: 0
2. FEELING DOWN, DEPRESSED OR HOPELESS: NOT AT ALL
SUM OF ALL RESPONSES TO PHQ QUESTIONS 1-9: 0
SUM OF ALL RESPONSES TO PHQ QUESTIONS 1-9: 0

## 2025-04-09 NOTE — PATIENT INSTRUCTIONS
cause a bad reaction if you take certain diabetes medicines.  Follow-up care is a key part of your treatment and safety. Be sure to make and go to all appointments, and call your doctor if you are having problems. It's also a good idea to know your test results and keep a list of the medicines you take.  Where can you learn more?  Go to https://www.My[employer].net/patientEd and enter I147 to learn more about \"Learning About Carbohydrate (Carb) Counting and Eating Out When You Have Diabetes.\"  Current as of: October 7, 2024  Content Version: 14.4  © 4117-0147 [employer].   Care instructions adapted under license by [employer]. If you have questions about a medical condition or this instruction, always ask your healthcare professional. [employer], disclaims any warranty or liability for your use of this information.

## 2025-04-09 NOTE — PROGRESS NOTES
HPI:  Charisse Linares is a 60 y.o. female who presents today with   Chief Complaint   Patient presents with    Diabetes     4 month follow-up     Hypertension    Vitamin D        History of Present Illness    Patient recently has had an urgent care visit for what was initially diagnosed as a urinary tract infection.  She was noted to have some protein in her urine.  She also has recently had some blood work that has revealed a decreased creatinine.  Her GFR is normal as well as her BUN.  Patient was reassured but still expressed concerns over the findings.  Her urinary urgency is better and she has stopped drinking protein drinks.    She does have diabetes.  Her A1c is controlled.  She is deciding if she wants to have surgical weight loss.  She has tried multiple modalities and treatment options for weight loss.  She does not exercise much.  She has had her blood work and is here to review the same.  Labs are as noted below.        Lab Results   Component Value Date    CHOL 137 04/02/2025    TRIG 109 04/02/2025    HDL 44 04/02/2025    LDL 71 04/02/2025    VLDL 22 04/02/2025    CHOLHDLRATIO 3.1 04/02/2025     Lab Results   Component Value Date     04/02/2025    K 3.7 04/02/2025     04/02/2025    CO2 26 04/02/2025    BUN 12 04/02/2025    CREATININE 0.6 (L) 04/02/2025    GLUCOSE 95 04/02/2025    CALCIUM 9.2 04/02/2025    BILITOT 0.2 04/02/2025    ALKPHOS 78 04/02/2025    AST 18 04/02/2025    ALT 19 04/02/2025    LABGLOM >60.0 04/02/2025    GFRAA >60.0 04/05/2022    AGRATIO 1.7 04/02/2025    GLOB 2.5 04/02/2025       Hemoglobin A1C   Date Value Ref Range Status   04/02/2025 5.9 (H) 4.8 - 5.6 % Final       Lab Results   Component Value Date/Time    VITD25 55.8 04/02/2025 10:15 AM        Wt Readings from Last 3 Encounters:   04/09/25 126.6 kg (279 lb 3.2 oz)   04/04/25 126.1 kg (278 lb)   03/26/25 128.4 kg (283 lb)                No data to display                  PMH,  Meds, Allergies, Family History,

## 2025-04-09 NOTE — PROGRESS NOTES
\"Have you been to the ER, urgent care clinic since your last visit?  Hospitalized since your last visit?\"    NO    “Have you seen or consulted any other health care providers outside our system since your last visit?”    No    “Have you had a diabetic eye exam?”    YES - Where: 2024- Ophthalmology- Dr. ARMANDO Hopkins Nurse/CMA to request most recent records if not in the chart     Date of last diabetic eye exam: 11/4/2022

## 2025-04-22 ENCOUNTER — CLINICAL DOCUMENTATION (OUTPATIENT)
Facility: HOSPITAL | Age: 60
End: 2025-04-22

## 2025-04-22 ENCOUNTER — HOSPITAL ENCOUNTER (OUTPATIENT)
Facility: HOSPITAL | Age: 60
Discharge: HOME | End: 2025-04-25

## 2025-04-22 ENCOUNTER — TELEPHONE (OUTPATIENT)
Facility: CLINIC | Age: 60
End: 2025-04-22

## 2025-04-22 NOTE — PROGRESS NOTES
Nutrition Evaluation    Patient's Name: Charisse Linares   Age: 60 y.o.  YOB: 1965   Sex: female    Height: 5 f 2   Starting Weight:  288  Weight: 277   BMI:            Smoking Status:  None  Alcohol Intake:  Number of Drinks at a Time: 1  Number of Times a Week: 1 x a month    Changes made during classes include:  Not eating after 8 pm  Drinking more water  Making better food choices        Summary:  I feel that Charisse Linares has demonstrated appropriate diet changes and is ready to move forward with surgery.  Patient has been briefed on the importance of the protein drinks, vitamins, and the transition of the diet stages. Patient understands that the long-term diet will focus on protein and vegetables.  Patient understand the effects of carbohydrates after surgery and what reactive hypoglycemia is.      Patient is aware that they will be attending pre-op class 2 weeks before surgery and will get more detailed information on the post-op diet guidelines.    Patient will see me again at 6 weeks post-op. At this 6 week visit, RD will assess how patient is tolerating soft protein and advance to vegetables, if tolerating soft protein without difficulty.  Patient will also see RD again at 9 months post-op.  This visit will assess patient's compliance with current protocol, including diet, vitamins, protein shakes, and exercise.  Post-op diet guidelines will be reinforced.  RD is available for questions and to meet with patient outside of the 6 week and 9 month post-op visit.     We spent a lot of time talking about the vitamins.  Patient understands the importance of being compliant with the diet protocol and the complications and risks that can occur if they are non-compliant with the nutritional protocol.     Patient has attended at least one support group.    Candidate for surgery: Yes  Re-evaluation Date:     Procedure:  Gastric Bypass    Janine Thompson, CAMELIA    4/22/2025

## 2025-04-22 NOTE — TELEPHONE ENCOUNTER
Patient came in to drop off [facility] paperwork for Dr. Youngblood to complete. Forms have been scanned into chart.

## 2025-04-22 NOTE — PROGRESS NOTES
[facility]  [address], [address]    Patient's Name: Charisse Linares     Age: 60 y.o.  YOB: 1965     Sex: female     Session: 3 of  3  Revision:    Surgeon: Dr. Gallagher  Date: 4/22/2025  Office Use Only:  IP    Height: 5 f 2   Weight:    277      Lbs.     BMI:    Pounds Lost since last month: 6                 Pounds Gained since last month: 0    Starting Weight: 288     Previous Month’s Weight: 283  Overall Pounds Lost: 11   Overall Pounds Gained: 0    Weight Loss:   Patient is currently being prescribed rybelsus to help lose weight.  This is being prescribed by: Dr. Youngblood.    Other:  Patient has been to a bariatric support group yet.    Does patient smoke? None    Current alcohol intake  Number of drinks at a time: 1  Number of times in a week: 1 -2 x a month    Class Guidelines    Guidelines are reviewed with patient at the start of every class.    1. Patient understands that weight loss trial classes must be consecutive.  Patient understands if they miss a class, it is their responsibility to contact me to reschedule class.  I will reach out to patient after their first no show.  2.  Patient understands the expectations that weight maintenance/weight loss is expected during the classes.  Failure to demonstrate changes may result in one extra month of weight loss trial, followed by going back to see the surgeon.  Patient understands that they CAN NOT gain any weight during the weight loss trial.  Gaining weight will result in extra classes.  3. Patient is also instructed to be doing their labs, blood work, psych visit, support group and any other test that the surgeon has used while they are working on their weight loss trial.  4.  Patient was instructed to bring their blue binder to every class and appointment.      Other Pertinent Information:     Changes Made Since Last Class:     Eating Habits and

## 2025-05-13 ENCOUNTER — OFFICE VISIT (OUTPATIENT)
Age: 60
End: 2025-05-13
Payer: COMMERCIAL

## 2025-05-13 VITALS
RESPIRATION RATE: 18 BRPM | DIASTOLIC BLOOD PRESSURE: 82 MMHG | BODY MASS INDEX: 51.67 KG/M2 | HEART RATE: 81 BPM | WEIGHT: 280.8 LBS | OXYGEN SATURATION: 99 % | SYSTOLIC BLOOD PRESSURE: 126 MMHG | TEMPERATURE: 97.3 F | HEIGHT: 62 IN

## 2025-05-13 DIAGNOSIS — E66.01 MORBID OBESITY (HCC): Primary | ICD-10-CM

## 2025-05-13 PROCEDURE — 99214 OFFICE O/P EST MOD 30 MIN: CPT | Performed by: STUDENT IN AN ORGANIZED HEALTH CARE EDUCATION/TRAINING PROGRAM

## 2025-05-13 PROCEDURE — 3074F SYST BP LT 130 MM HG: CPT | Performed by: STUDENT IN AN ORGANIZED HEALTH CARE EDUCATION/TRAINING PROGRAM

## 2025-05-13 PROCEDURE — 3079F DIAST BP 80-89 MM HG: CPT | Performed by: STUDENT IN AN ORGANIZED HEALTH CARE EDUCATION/TRAINING PROGRAM

## 2025-05-13 NOTE — PROGRESS NOTES
Bariatric Surgery Preoperative Visit    Patient: Charisse Linares MRN: [id number]  SSN: [id number]    YOB: 1965  Age: 60 y.o.  Sex: female      Subjective:      CC: Bariatric Surgery Preop Visit    Current Weight: 280  Program Entry Weight 288  Body mass index is 51.36 kg/m².  Ideal body weight: 50.1 kg (110 lb 7.2 oz)  Adjusted ideal body weight: 81 kg (178 lb 9.4 oz)  Excess Body Weight: 170    Charisse Linares is a 60 y.o. female who is being seen for a preop bariatric consultation. She has completed the preoperative bariatric surgery requirements and presents today to discuss surgical scheduling.     PREOP:  Nutrition: Approved 2025  Psych Clearance: Approved May 2025  Labs reviewed; hemoglobin A1c 5.9%.  Otherwise unremarkable  EGD: Slightly patulous hiatus but not with clear hiatal hernia.  Multiple medium sized pedunculated polyps in the gastric body, snare biopsy of representative polyp was taken and was benign.  Antral biopsies negative for H. Pylori  Colonoscopy normal    Past Medical History:   Diagnosis Date    Allergic rhinitis     Anemia     Arthritis     BMI 45.0-49.9, adult (HCC)     49.5    Conjunctivitis, bacterial 04    left    Diabetes mellitus (HCC)     Fibroids     GERD (gastroesophageal reflux disease)     High cholesterol     Hypercholesterolemia 2-    TSH 0.6    Hypertension     Knee pain, left     Osteoarthritis     Pelvic pain     Sleep apnea     USES CPAP    Uterine fibroid     Vitamin D deficiency      Past Surgical History:   Procedure Laterality Date     SECTION  97 ; 01     SECTION      2 times    COLONOSCOPY N/A 2016    COLONOSCOPY performed by Mayco Caldwell MD at [facility] ENDOSCOPY    COLONOSCOPY      COLONOSCOPY N/A 3/26/2025    COLONOSCOPY DIAGNOSTIC performed by Arben Tran MD at [facility] ENDOSCOPY    DILATION AND CURETTAGE OF UTERUS      3 times    HYSTERECTOMY (CERVIX STATUS UNKNOWN)  2022    MYOMECTOMY  -

## 2025-05-13 NOTE — PROGRESS NOTES
Chief Complaint   Patient presents with    Pre-op Exam     Gastric Bypass     1. Have you been to the ER, urgent care clinic since your last visit?  Hospitalized since your last visit?No    2. Have you seen or consulted any other health care providers outside of the [facility] since your last visit?  Include any pap smears or colon screening. Yes Reason for visit: general visit with PCP

## 2025-05-16 ENCOUNTER — PREP FOR PROCEDURE (OUTPATIENT)
Age: 60
End: 2025-05-16

## 2025-05-16 DIAGNOSIS — I10 HYPERTENSION, ESSENTIAL: ICD-10-CM

## 2025-05-16 PROBLEM — E11.9 DIABETES MELLITUS (HCC): Status: ACTIVE | Noted: 2025-05-16

## 2025-06-03 ENCOUNTER — CLINICAL DOCUMENTATION (OUTPATIENT)
Facility: HOSPITAL | Age: 60
End: 2025-06-03

## 2025-06-03 ENCOUNTER — HOSPITAL ENCOUNTER (OUTPATIENT)
Facility: HOSPITAL | Age: 60
Discharge: HOME | End: 2025-06-06

## 2025-06-03 NOTE — PROGRESS NOTES
your provider with a small sip of  water as soon as you get up.   Wear proper clothing that is loose-fitting and easily removed.   Avoid back zippers and pantyhose.   Please remove ALL jewelry (leave ALL jewelry and valuables at home).   Avoid using perfumes, deodorant, shaving creams or any scented lotions.   Bring a case with your name on it to hold your eyeglasses, contact lenses, hearing aids  and dentures.  Reporting to the Hospital  You will be asked to arrive approximately two  hours before your scheduled surgery. It is important  that you arrive on time to the hospital to avoid any  problems with starting your surgery on time. In some  cases lateness could result in moving your surgery to  a much later time. Your physician’s office will provide  your time of arrival to the hospital.  Where Do You Report the Day of Surgery?   [facility]: [address] [place] [id number].  Enter through the main entrance. Turn left just past the information desk into the  Registration Office. You will check in for surgery there.  You may designate one person to be contacted when your surgery has been completed.  -- 36 --  PATIENT GUIDE TO BARIATRIC SURGERY  [facility]  Before Surgery  Preoperative     Preoperative Preparation Area  Once you arrive at the hospital you will be escorted to the preoperative preparation area.  During the preoperative phase, a nurse will review your medical records and conduct a brief  physical examination to include vital signs (i.e., blood pressure, pulse, temperature, respirations or  breathing). An intravenous tube will be inserted with IV fluids. Your skin will be cleansed with CHG  wipes. If you wear dentures, eyeglasses or contact lenses you will need to remove them at this time.  You will see your surgeon, your anesthesiologist and meet the members of your surgical team.  Medications, such as antibiotics, may be given by anesthetists to

## 2025-06-03 NOTE — PROGRESS NOTES
CLINICAL NUTRITION PRE-OPERATIVE EDUCATION    Patient's Name: Charisse Linares   Age: 60 y.o.  YOB: 1965   Sex: female    Education & Materials Provided:   - Soft Protein Diet Shopping List  -  Supplemental Resource Guide: MVI, B12, Calcium Citrate, Vitamin D, Vitamin B1,   and iron recommendations  - Protein Supplement Information  - Fluid Requirements/ No Straws  - No Caffeine or Carbonation   - No alcohol              - No Snacks or No Concentrated Sweets     - Exercising   - Support System at Home/ List of Support Group meetings.   Support System after surgery includes: x     - Key Diet Principles            - Addressed Current Habits/Changes to Make   - Patient has been educated on the liquid diet to begin 1 week prior to surgery.     Patient understands the transition of the diet.       Attendance of support group: Yes    Summary:  Patient has completed the required amount of visits with the Registered Dietitian.   During these nutrition visits, we focused on dietary changes, behavior changes, and the importance of establishing an exercise routine.  The patient understands about the 10 day pre op liquid diet.      At today's session, patient was educated on the post-op diet protocol.  Patient understands the importance of keeping total fat and sugar less than 3 grams per serving.  Patient is aware of the transition of the diet stages and is aware that they will be on clear liquids for 7days, followed by soft protein for 5 weeks.  Patient understands the body needs ~ 60-70 grams of protein per day.  Patient understands that they will need to do 2 protein shakes a day or equivalent to 60-70 grams of protein from supplements to meet their protein needs.    We spent a lot of time talking about the vitamins.  Patient understands the importance of being compliant with the diet protocol and the complications and risks that can occur if they are non-compliant with the nutritional protocol and

## 2025-08-06 ENCOUNTER — LAB (OUTPATIENT)
Facility: CLINIC | Age: 60
End: 2025-08-06

## 2025-08-06 ENCOUNTER — HOSPITAL ENCOUNTER (OUTPATIENT)
Facility: HOSPITAL | Age: 60
Setting detail: SPECIMEN
Discharge: HOME | End: 2025-08-09

## 2025-08-06 DIAGNOSIS — E11.65 TYPE 2 DIABETES MELLITUS WITH HYPERGLYCEMIA, WITHOUT LONG-TERM CURRENT USE OF INSULIN (HCC): ICD-10-CM

## 2025-08-06 DIAGNOSIS — I10 PRIMARY HYPERTENSION: ICD-10-CM

## 2025-08-06 DIAGNOSIS — E55.9 VITAMIN D DEFICIENCY: ICD-10-CM

## 2025-08-06 LAB
A/G RATIO: 1.7 RATIO (ref 1.1–2.6)
ALBUMIN: 4.2 G/DL (ref 3.5–5)
ALP BLD-CCNC: 83 U/L (ref 40–120)
ALT SERPL-CCNC: 15 U/L (ref 5–40)
ANION GAP SERPL CALCULATED.3IONS-SCNC: 11 MMOL/L (ref 3–15)
AST SERPL-CCNC: 13 U/L (ref 10–37)
BILIRUB SERPL-MCNC: 0.3 MG/DL (ref 0.2–1.2)
BUN BLDV-MCNC: 14 MG/DL (ref 6–22)
CALCIUM SERPL-MCNC: 9.8 MG/DL (ref 8.4–10.5)
CHLORIDE BLD-SCNC: 103 MMOL/L (ref 98–110)
CHOLESTEROL, TOTAL: 157 MG/DL (ref 110–200)
CHOLESTEROL/HDL RATIO: 4 (ref 0–5)
CO2: 28 MMOL/L (ref 20–32)
CREAT SERPL-MCNC: 0.6 MG/DL (ref 0.8–1.4)
ESTIMATED AVERAGE GLUCOSE: 133 MG/DL (ref 91–123)
GFR, ESTIMATED: >90 ML/MIN/1.73 SQ.M.
GLOBULIN: 2.5 G/DL (ref 2–4)
GLUCOSE: 136 MG/DL (ref 70–99)
HBA1C MFR BLD: 6.3 % (ref 4.8–5.6)
HDLC SERPL-MCNC: 39 MG/DL
LDL CHOLESTEROL: 86 MG/DL (ref 50–99)
LDL/HDL RATIO: 2.2
NON-HDL CHOLESTEROL: 118 MG/DL
POTASSIUM SERPL-SCNC: 4.7 MMOL/L (ref 3.5–5.5)
SODIUM BLD-SCNC: 142 MMOL/L (ref 133–145)
TOTAL PROTEIN: 6.7 G/DL (ref 6.2–8.1)
TRIGL SERPL-MCNC: 156 MG/DL (ref 40–149)
VLDLC SERPL CALC-MCNC: 31 MG/DL (ref 8–30)

## 2025-08-06 PROCEDURE — 99001 SPECIMEN HANDLING PT-LAB: CPT

## 2025-08-07 LAB
SENTARA SPECIMEN COLLECTION: NORMAL
TSH SERPL DL<=0.05 MIU/L-ACNC: 0.87 MCU/ML (ref 0.27–4.2)
VITAMIN D 25-HYDROXY: 61.9 NG/ML (ref 32–100)

## 2025-08-12 ENCOUNTER — OFFICE VISIT (OUTPATIENT)
Facility: CLINIC | Age: 60
End: 2025-08-12
Payer: COMMERCIAL

## 2025-08-12 VITALS
TEMPERATURE: 97.5 F | HEIGHT: 62 IN | BODY MASS INDEX: 51.56 KG/M2 | OXYGEN SATURATION: 97 % | SYSTOLIC BLOOD PRESSURE: 128 MMHG | DIASTOLIC BLOOD PRESSURE: 81 MMHG | HEART RATE: 65 BPM | RESPIRATION RATE: 20 BRPM | WEIGHT: 280.2 LBS

## 2025-08-12 DIAGNOSIS — E11.65 TYPE 2 DIABETES MELLITUS WITH HYPERGLYCEMIA, WITHOUT LONG-TERM CURRENT USE OF INSULIN (HCC): Primary | ICD-10-CM

## 2025-08-12 DIAGNOSIS — E66.01 OBESITY, MORBID (HCC): ICD-10-CM

## 2025-08-12 PROCEDURE — 99214 OFFICE O/P EST MOD 30 MIN: CPT | Performed by: FAMILY MEDICINE

## 2025-08-12 PROCEDURE — 3074F SYST BP LT 130 MM HG: CPT | Performed by: FAMILY MEDICINE

## 2025-08-12 PROCEDURE — 3079F DIAST BP 80-89 MM HG: CPT | Performed by: FAMILY MEDICINE

## 2025-08-12 PROCEDURE — 3044F HG A1C LEVEL LT 7.0%: CPT | Performed by: FAMILY MEDICINE

## 2025-08-12 ASSESSMENT — PATIENT HEALTH QUESTIONNAIRE - PHQ9: DEPRESSION UNABLE TO ASSESS: PT REFUSES

## 2025-09-02 RX ORDER — OMEPRAZOLE 40 MG/1
40 CAPSULE, DELAYED RELEASE ORAL DAILY
Qty: 90 CAPSULE | Refills: 0 | Status: SHIPPED | OUTPATIENT
Start: 2025-09-02

## (undated) DEVICE — GAUZE,SPONGE,4"X4",16PLY,STRL,LF,10/TRAY: Brand: MEDLINE

## (undated) DEVICE — BASIN EMSIS 16OZ GRAPHITE PLAS KID SHP MOLD GRAD FOR ORAL

## (undated) DEVICE — YANKAUER,SMOOTH HANDLE,HIGH CAPACITY: Brand: MEDLINE INDUSTRIES, INC.

## (undated) DEVICE — SYRINGE MED 50ML LUERSLIP TIP

## (undated) DEVICE — TRAP EVAC NO 5500 DISPOS-A-TRAP

## (undated) DEVICE — SNARE POLYP M W27MMXL240CM OVL STIFF DISP CAPTIVATOR

## (undated) DEVICE — CANNULA ORIG TL CLR W FOAM CUSHIONS AND 14FT SUPL TB 3 CHN

## (undated) DEVICE — SNARE VASC L240CM LOOP W10MM SHTH DIA2.4MM RND STIFF CLD

## (undated) DEVICE — SOLUTION IRRIG 1000ML H2O STRL BLT

## (undated) DEVICE — ENDOSCOPY PUMP TUBING/ CAP SET: Brand: ERBE

## (undated) DEVICE — CATHETER SUCT TR FL TIP 14FR W/ O CTRL

## (undated) DEVICE — FORCEPS BX L240CM JAW DIA2.8MM L CAP W/ NDL MIC MESH TOOTH

## (undated) DEVICE — STERILE POLYISOPRENE POWDER-FREE SURGICAL GLOVES: Brand: PROTEXIS

## (undated) DEVICE — CANNULA NSL AD TBNG L14FT STD PVC O2 CRV CONN NONFLARED NSL

## (undated) DEVICE — MEDI-VAC NON-CONDUCTIVE SUCTION TUBING: Brand: CARDINAL HEALTH

## (undated) DEVICE — UNDERPAD INCONT W23XL36IN STD BLU POLYPR BK FLUF SFT

## (undated) DEVICE — LINER SUCT CANSTR 3000CC PLAS SFT PRE ASSEMB W/OUT TBNG W/

## (undated) DEVICE — SYRINGE MED 10ML LUERLOCK TIP W/O SFTY DISP

## (undated) DEVICE — CATHETER THOR 36FR DIA10.7MM POLYVI CHL TRCR TIP STR SFT

## (undated) DEVICE — SYRINGE MED 25GA 3ML L5/8IN SUBQ PLAS W/ DETACH NDL SFTY

## (undated) DEVICE — FORCEPS BX L240CM JAW DIA2.4MM ORNG L CAP W/ NDL DISP RAD

## (undated) DEVICE — SYRINGE 20ML LL S/C 50

## (undated) DEVICE — GOWN PLASTIC FILM THMBHKS UNIV BLUE: Brand: CARDINAL HEALTH

## (undated) DEVICE — BITE BLOCK ENDOSCP UNIV AD 6 TO 9.4 MM